# Patient Record
Sex: MALE | Race: WHITE | NOT HISPANIC OR LATINO | URBAN - METROPOLITAN AREA
[De-identification: names, ages, dates, MRNs, and addresses within clinical notes are randomized per-mention and may not be internally consistent; named-entity substitution may affect disease eponyms.]

---

## 2017-12-01 ENCOUNTER — OUTPATIENT (OUTPATIENT)
Dept: OUTPATIENT SERVICES | Facility: HOSPITAL | Age: 47
LOS: 1 days | Discharge: HOME | End: 2017-12-01

## 2017-12-01 DIAGNOSIS — Z01.818 ENCOUNTER FOR OTHER PREPROCEDURAL EXAMINATION: ICD-10-CM

## 2017-12-01 DIAGNOSIS — K40.90 UNILATERAL INGUINAL HERNIA, WITHOUT OBSTRUCTION OR GANGRENE, NOT SPECIFIED AS RECURRENT: ICD-10-CM

## 2017-12-15 ENCOUNTER — OUTPATIENT (OUTPATIENT)
Dept: OUTPATIENT SERVICES | Facility: HOSPITAL | Age: 47
LOS: 1 days | Discharge: HOME | End: 2017-12-15

## 2017-12-19 DIAGNOSIS — F17.210 NICOTINE DEPENDENCE, CIGARETTES, UNCOMPLICATED: ICD-10-CM

## 2017-12-19 DIAGNOSIS — E11.9 TYPE 2 DIABETES MELLITUS WITHOUT COMPLICATIONS: ICD-10-CM

## 2017-12-19 DIAGNOSIS — K45.8 OTHER SPECIFIED ABDOMINAL HERNIA WITHOUT OBSTRUCTION OR GANGRENE: ICD-10-CM

## 2017-12-19 DIAGNOSIS — E78.00 PURE HYPERCHOLESTEROLEMIA, UNSPECIFIED: ICD-10-CM

## 2017-12-19 DIAGNOSIS — K40.90 UNILATERAL INGUINAL HERNIA, WITHOUT OBSTRUCTION OR GANGRENE, NOT SPECIFIED AS RECURRENT: ICD-10-CM

## 2017-12-19 DIAGNOSIS — I10 ESSENTIAL (PRIMARY) HYPERTENSION: ICD-10-CM

## 2020-03-16 ENCOUNTER — RESULT CHARGE (OUTPATIENT)
Age: 50
End: 2020-03-16

## 2020-03-16 ENCOUNTER — APPOINTMENT (OUTPATIENT)
Dept: UROLOGY | Facility: CLINIC | Age: 50
End: 2020-03-16
Payer: COMMERCIAL

## 2020-03-16 VITALS
WEIGHT: 180 LBS | SYSTOLIC BLOOD PRESSURE: 158 MMHG | BODY MASS INDEX: 27.28 KG/M2 | HEART RATE: 75 BPM | HEIGHT: 68 IN | DIASTOLIC BLOOD PRESSURE: 97 MMHG | TEMPERATURE: 96.8 F

## 2020-03-16 DIAGNOSIS — Z80.1 FAMILY HISTORY OF MALIGNANT NEOPLASM OF TRACHEA, BRONCHUS AND LUNG: ICD-10-CM

## 2020-03-16 DIAGNOSIS — F17.200 NICOTINE DEPENDENCE, UNSPECIFIED, UNCOMPLICATED: ICD-10-CM

## 2020-03-16 LAB
BILIRUB UR QL STRIP: NORMAL
COLLECTION METHOD: NORMAL
GLUCOSE UR-MCNC: NORMAL
HCG UR QL: NORMAL EU/DL
HGB UR QL STRIP.AUTO: NORMAL
KETONES UR-MCNC: NORMAL
LEUKOCYTE ESTERASE UR QL STRIP: NORMAL
NITRITE UR QL STRIP: NORMAL
PH UR STRIP: 6
PROT UR STRIP-MCNC: NORMAL
SP GR UR STRIP: 1.01

## 2020-03-16 PROCEDURE — 52000 CYSTOURETHROSCOPY: CPT

## 2020-03-16 PROCEDURE — 81003 URINALYSIS AUTO W/O SCOPE: CPT | Mod: QW

## 2020-03-16 PROCEDURE — 99205 OFFICE O/P NEW HI 60 MIN: CPT | Mod: 25

## 2020-03-16 NOTE — ADDENDUM
[FreeTextEntry1] : ML CASTRO M.D.\par [address] [place] [address], [address]\par [place], [place] [address]\par [phone]\par FAX#: [phone]\par 2020\par \par Dear Nuria Grover		: 10-Parish-1970\par \par You have been scheduled for CYSTOSCOPY WITH Trans Urethral Removal Of your Bladder Tumor (TURBT)  at [facility] ([facility]) on Tuesday April  / May __2020 at _____ am/p.m.  Please avoid all aspirin and aspirin like products, etc., for one week before.  You have been given a pre admission instruction sheet.  If you have any questions concerning what you should or should not do please contact me.\par \par If you will be leaving hospital the afternoon of the procedure, I will let you know when and where I would like to see you the next day, so that I can remove the catheter.  \par \par If you are staying in the hospital overnight the catheter is usually removed the next day.  While the catheter is inside you, you may have a discomfort in the penis and or the sensation of having to urinate.  If this occurs, it can occur with or without urine coming out around the pipe or catheter.  This feeling is called a Bladder Spasm and though it can be very uncomfortable or even painful, it does not threaten your health.  If it does occur and bothers you, please call me so I can help alleviate them to whatever extent possible.  After the catheter is removed you might have some burning with urination. This usually stops within 2-3 days.  You may also have some blood in the urine.  This rarely is so severe as to cause problems.  As long as you are able to urinate, drink lots of fluids and it should clear with time.\par \par If at any point you are concerned please Do Not Hesitate to call. If it is after normal business hours and it is urgent you can reach me via service at [phone] [phone].  If it is not urgent please call the appropriate office during regular business hours.  As always if you are in doubt I would rather you call unnecessarily than not call when you should have,\par I thank you again for the confidence of allowing me to care for you.\par \par \par Sincerely yours,\par \par Ml Castro M.D.\par \par Ml Castro M.D.\par NMK\par PS: IF you have any ALLERGIES or any special requirements, such as special prescription blanks or you need the prescriptions in advance, please let me know.\par

## 2020-03-16 NOTE — LETTER BODY
[Dear  ___] : Dear  [unfilled], [Consult Letter:] : I had the pleasure of evaluating your patient, [unfilled]. [Please see my note below.] : Please see my note below. [Consult Closing:] : Thank you very much for allowing me to participate in the care of this patient.  If you have any questions, please do not hesitate to contact me. [Sincerely,] : Sincerely, [FreeTextEntry2] : Dr. Malcom Senior\par [address]\[place] [address]

## 2020-03-16 NOTE — ASSESSMENT
[FreeTextEntry1] : CT scan suggests bladder mass and we reviewed all the options. Patient is electing to undergo cystoscopy today, please see separate procedure note.\par \par UA CS and cytology will be sent from the office today\par \par cysto positive for polypoid mass right posterolateral wall\par \par

## 2020-03-16 NOTE — PHYSICAL EXAM
[General Appearance - Well Developed] : well developed [General Appearance - Well Nourished] : well nourished [Normal Appearance] : normal appearance [Well Groomed] : well groomed [General Appearance - In No Acute Distress] : no acute distress [Edema] : no peripheral edema [Respiration, Rhythm And Depth] : normal respiratory rhythm and effort [Exaggerated Use Of Accessory Muscles For Inspiration] : no accessory muscle use [Abdomen Soft] : soft [Abdomen Tenderness] : non-tender [Costovertebral Angle Tenderness] : no ~M costovertebral angle tenderness [Urethral Meatus] : meatus normal [Penis Abnormality] : normal circumcised penis [Urinary Bladder Findings] : the bladder was normal on palpation [Scrotum] : the scrotum was normal [Testes Tenderness] : no tenderness of the testes [Testes Mass (___cm)] : there were no testicular masses [Normal Station and Gait] : the gait and station were normal for the patient's age [] : no rash [No Focal Deficits] : no focal deficits [Oriented To Time, Place, And Person] : oriented to person, place, and time [Affect] : the affect was normal [Mood] : the mood was normal [Not Anxious] : not anxious [Femoral Lymph Nodes Enlarged Bilaterally] : femoral [Inguinal Lymph Nodes Enlarged Bilaterally] : inguinal [Heart Rate And Rhythm] : Heart rate and rhythm were normal [Abdomen Hernia] : no hernia was discovered [FreeTextEntry1] : ramo not done pt already very anxious

## 2020-03-16 NOTE — HISTORY OF PRESENT ILLNESS
[Urinary Urgency] : urinary urgency [Urinary Frequency] : urinary frequency [Nocturia] : nocturia [Weak Stream] : weak stream [Erectile Dysfunction] : Erectile Dysfunction [Hematuria - Gross] : gross hematuria [FreeTextEntry1] : Guilherme is a 49-year-old male who was sent in for consultation regarding gross hematuria and bladder mass.\par \par Approximately 2 months ago patient noticed a few episodes of gross hematuria. He presented to his PCP who ordered an ultrasound.\par \par Ultrasounds demonstrated questionable polypoid bladder mass. He was sent for CT scan with IV and oral contrast.\par \par CT scan demonstrated focal thickening along the right posterior lateral urinary bladder wall corresponding to sonographic finding, concerning for urothelial neoplasm. There is no lymphadenopathy. He was sent here for  evaluation\par \par He is a 60-pack-year current cigarette smoker.\par \par Baseline patient is complaining of occasional urinary frequency or feelings of incomplete bladder emptying semi-urinary urgency.\par \par He has some mild erectile dysfunction with decreased penile rigidity however he denies difficulty obtaining or maintaining an erection of sufficient quality. [Urinary Incontinence] : no urinary incontinence [Urinary Retention] : no urinary retention [Intermittency] : no intermittency [Post-Void Dribbling] : no post-void dribbling

## 2020-03-16 NOTE — LETTER HEADER
[FreeTextEntry3] : Ml Castro M.D.\par Director of Urology\par [facility]\par [address] [place] [address], [address]\[place] [address]

## 2020-05-27 ENCOUNTER — TRANSCRIPTION ENCOUNTER (OUTPATIENT)
Age: 50
End: 2020-05-27

## 2020-05-27 ENCOUNTER — APPOINTMENT (OUTPATIENT)
Dept: UROLOGY | Facility: CLINIC | Age: 50
End: 2020-05-27
Payer: COMMERCIAL

## 2020-05-27 PROCEDURE — 99214 OFFICE O/P EST MOD 30 MIN: CPT | Mod: 95

## 2020-05-27 RX ORDER — NITROFURANTOIN (MONOHYDRATE/MACROCRYSTALS) 25; 75 MG/1; MG/1
100 CAPSULE ORAL TWICE DAILY
Qty: 6 | Refills: 0 | Status: COMPLETED | COMMUNITY
Start: 2020-03-16 | End: 2020-05-27

## 2020-05-27 NOTE — HISTORY OF PRESENT ILLNESS
[Home] : at home, [unfilled] , at the time of the visit. [Medical Office: ([facility])___] : at the medical office located in  [FreeTextEntry3] : he has received, reviewed and agreed to the telemedicine consent  [FreeTextEntry1] : I called him on his cell phone at [phone], he was home and we communicated via the [employer] sarah if needed paper work can be sent to hi via his e mail at --- [email or web address]\par \par Seen 03/16/2020 for [facility] and question of a bladder mass. As well as complaining of occasional urinary frequency or feelings of incomplete bladder emptying semi-urinary urgency. He also had some mild erectile dysfunction with decreased penile rigidity however he denies difficulty obtaining or maintaining an erection of sufficient quality.  Patient was experiencing urinary urgency, urinary frequency, nocturia, weak stream, erectile dysfunction and gross hematuria, but no urinary incontinence, no urinary retention, no intermittency and no post-void dribbling. \par  Cystoscopy done the same day on 03/16/2020 showed a right posterolateral wall tumors stalk / frond like\par Note grade 3 trabeculation with early cellules was seen. He understood the need to delay during covid but those restrictions have been [partially lifted but now the pt is afraid and wants to delay to the fall. We set up telemedicine to discuss it with him\par \par  [Hematuria - Gross] : gross hematuria

## 2020-05-27 NOTE — LETTER HEADER
[FreeTextEntry3] : Ml Castro M.D.\par Director of Urology\par [facility]\par [address] [place] [address], [address]\[place] [address]

## 2020-05-27 NOTE — LETTER BODY
[Dear  ___] : Dear  [unfilled], [Courtesy Letter:] : I had the pleasure of seeing your patient, [unfilled], in my office today. [Please see my note below.] : Please see my note below. [Sincerely,] : Sincerely, [FreeTextEntry2] : Dr. Malcom Senior\par [address]\[place] [address]

## 2020-05-27 NOTE — PHYSICAL EXAM
[General Appearance - Well Developed] : well developed [General Appearance - Well Nourished] : well nourished [Normal Appearance] : normal appearance [Well Groomed] : well groomed [General Appearance - In No Acute Distress] : no acute distress [] : no respiratory distress [Respiration, Rhythm And Depth] : normal respiratory rhythm and effort [Exaggerated Use Of Accessory Muscles For Inspiration] : no accessory muscle use [Oriented To Time, Place, And Person] : oriented to person, place, and time [Affect] : the affect was normal [Mood] : the mood was normal [FreeTextEntry1] : very anxious

## 2020-06-03 ENCOUNTER — OUTPATIENT (OUTPATIENT)
Dept: OUTPATIENT SERVICES | Facility: HOSPITAL | Age: 50
LOS: 1 days | Discharge: HOME | End: 2020-06-03
Payer: COMMERCIAL

## 2020-06-03 VITALS
HEART RATE: 80 BPM | OXYGEN SATURATION: 98 % | RESPIRATION RATE: 16 BRPM | TEMPERATURE: 98 F | SYSTOLIC BLOOD PRESSURE: 127 MMHG | DIASTOLIC BLOOD PRESSURE: 75 MMHG | WEIGHT: 171.96 LBS | HEIGHT: 68 IN

## 2020-06-03 DIAGNOSIS — C67.2 MALIGNANT NEOPLASM OF LATERAL WALL OF BLADDER: ICD-10-CM

## 2020-06-03 DIAGNOSIS — Z01.818 ENCOUNTER FOR OTHER PREPROCEDURAL EXAMINATION: ICD-10-CM

## 2020-06-03 DIAGNOSIS — Z98.890 OTHER SPECIFIED POSTPROCEDURAL STATES: Chronic | ICD-10-CM

## 2020-06-03 LAB
A1C WITH ESTIMATED AVERAGE GLUCOSE RESULT: 6.5 % — HIGH (ref 4–5.6)
ALBUMIN SERPL ELPH-MCNC: 5.1 G/DL — SIGNIFICANT CHANGE UP (ref 3.5–5.2)
ALP SERPL-CCNC: 63 U/L — SIGNIFICANT CHANGE UP (ref 30–115)
ALT FLD-CCNC: 20 U/L — SIGNIFICANT CHANGE UP (ref 0–41)
ANION GAP SERPL CALC-SCNC: 17 MMOL/L — HIGH (ref 7–14)
APPEARANCE UR: CLEAR — SIGNIFICANT CHANGE UP
APTT BLD: 36.8 SEC — SIGNIFICANT CHANGE UP (ref 27–39.2)
AST SERPL-CCNC: 18 U/L — SIGNIFICANT CHANGE UP (ref 0–41)
BASOPHILS # BLD AUTO: 0.05 K/UL — SIGNIFICANT CHANGE UP (ref 0–0.2)
BASOPHILS NFR BLD AUTO: 1.3 % — HIGH (ref 0–1)
BILIRUB SERPL-MCNC: 0.5 MG/DL — SIGNIFICANT CHANGE UP (ref 0.2–1.2)
BILIRUB UR-MCNC: NEGATIVE — SIGNIFICANT CHANGE UP
BUN SERPL-MCNC: 15 MG/DL — SIGNIFICANT CHANGE UP (ref 10–20)
CALCIUM SERPL-MCNC: 9.8 MG/DL — SIGNIFICANT CHANGE UP (ref 8.5–10.1)
CHLORIDE SERPL-SCNC: 100 MMOL/L — SIGNIFICANT CHANGE UP (ref 98–110)
CO2 SERPL-SCNC: 27 MMOL/L — SIGNIFICANT CHANGE UP (ref 17–32)
COLOR SPEC: YELLOW — SIGNIFICANT CHANGE UP
CREAT SERPL-MCNC: 0.7 MG/DL — SIGNIFICANT CHANGE UP (ref 0.7–1.5)
DIFF PNL FLD: NEGATIVE — SIGNIFICANT CHANGE UP
EOSINOPHIL # BLD AUTO: 0.56 K/UL — SIGNIFICANT CHANGE UP (ref 0–0.7)
EOSINOPHIL NFR BLD AUTO: 14.2 % — HIGH (ref 0–8)
ESTIMATED AVERAGE GLUCOSE: 140 MG/DL — HIGH (ref 68–114)
GLUCOSE SERPL-MCNC: 113 MG/DL — HIGH (ref 70–99)
GLUCOSE UR QL: NEGATIVE — SIGNIFICANT CHANGE UP
HCT VFR BLD CALC: 45.6 % — SIGNIFICANT CHANGE UP (ref 42–52)
HGB BLD-MCNC: 15.3 G/DL — SIGNIFICANT CHANGE UP (ref 14–18)
IMM GRANULOCYTES NFR BLD AUTO: 0.3 % — SIGNIFICANT CHANGE UP (ref 0.1–0.3)
INR BLD: 0.99 RATIO — SIGNIFICANT CHANGE UP (ref 0.65–1.3)
KETONES UR-MCNC: NEGATIVE — SIGNIFICANT CHANGE UP
LEUKOCYTE ESTERASE UR-ACNC: NEGATIVE — SIGNIFICANT CHANGE UP
LYMPHOCYTES # BLD AUTO: 1.28 K/UL — SIGNIFICANT CHANGE UP (ref 1.2–3.4)
LYMPHOCYTES # BLD AUTO: 32.6 % — SIGNIFICANT CHANGE UP (ref 20.5–51.1)
MCHC RBC-ENTMCNC: 29 PG — SIGNIFICANT CHANGE UP (ref 27–31)
MCHC RBC-ENTMCNC: 33.6 G/DL — SIGNIFICANT CHANGE UP (ref 32–37)
MCV RBC AUTO: 86.4 FL — SIGNIFICANT CHANGE UP (ref 80–94)
MONOCYTES # BLD AUTO: 0.46 K/UL — SIGNIFICANT CHANGE UP (ref 0.1–0.6)
MONOCYTES NFR BLD AUTO: 11.7 % — HIGH (ref 1.7–9.3)
NEUTROPHILS # BLD AUTO: 1.57 K/UL — SIGNIFICANT CHANGE UP (ref 1.4–6.5)
NEUTROPHILS NFR BLD AUTO: 39.9 % — LOW (ref 42.2–75.2)
NITRITE UR-MCNC: NEGATIVE — SIGNIFICANT CHANGE UP
NRBC # BLD: 0 /100 WBCS — SIGNIFICANT CHANGE UP (ref 0–0)
PH UR: 6 — SIGNIFICANT CHANGE UP (ref 5–8)
PLATELET # BLD AUTO: 215 K/UL — SIGNIFICANT CHANGE UP (ref 130–400)
POTASSIUM SERPL-MCNC: 4.2 MMOL/L — SIGNIFICANT CHANGE UP (ref 3.5–5)
POTASSIUM SERPL-SCNC: 4.2 MMOL/L — SIGNIFICANT CHANGE UP (ref 3.5–5)
PROT SERPL-MCNC: 7.8 G/DL — SIGNIFICANT CHANGE UP (ref 6–8)
PROT UR-MCNC: SIGNIFICANT CHANGE UP
PROTHROM AB SERPL-ACNC: 11.4 SEC — SIGNIFICANT CHANGE UP (ref 9.95–12.87)
RBC # BLD: 5.28 M/UL — SIGNIFICANT CHANGE UP (ref 4.7–6.1)
RBC # FLD: 14.5 % — SIGNIFICANT CHANGE UP (ref 11.5–14.5)
SODIUM SERPL-SCNC: 144 MMOL/L — SIGNIFICANT CHANGE UP (ref 135–146)
SP GR SPEC: 1.02 — SIGNIFICANT CHANGE UP (ref 1.01–1.02)
UROBILINOGEN FLD QL: SIGNIFICANT CHANGE UP
WBC # BLD: 3.93 K/UL — LOW (ref 4.8–10.8)
WBC # FLD AUTO: 3.93 K/UL — LOW (ref 4.8–10.8)

## 2020-06-03 PROCEDURE — 93010 ELECTROCARDIOGRAM REPORT: CPT

## 2020-06-03 NOTE — H&P PST ADULT - NSICDXPASTMEDICALHX_GEN_ALL_CORE_FT
PAST MEDICAL HISTORY:  Bladder tumor     Diabetes     Hepatic steatosis     HTN (hypertension)     Hypercholesterolemia     Lung nodules

## 2020-06-03 NOTE — H&P PST ADULT - HISTORY OF PRESENT ILLNESS
48 YO MALE presents with c/o ~ 6-8 month ago "a tiny bit of blood in my urine, i had testsI& there is tumor in my bladder" pt is scheduled for cysto, turbt;  denies chest pain, palpitations, shortness of breath, dyspnea, or dysuria, or hematuria, pt denies any present pain/discomfort  exercise tolerance: 5 blocks/ flights of stairs w/o sob;

## 2020-06-03 NOTE — H&P PST ADULT - NSANTHOSAYNRD_GEN_A_CORE
No. RONNIE screening performed.  STOP BANG Legend: 0-2 = LOW Risk; 3-4 = INTERMEDIATE Risk; 5-8 = HIGH Risk/never tested, see screening tool

## 2020-06-04 LAB
CULTURE RESULTS: NO GROWTH — SIGNIFICANT CHANGE UP
SPECIMEN SOURCE: SIGNIFICANT CHANGE UP

## 2020-06-14 ENCOUNTER — LABORATORY RESULT (OUTPATIENT)
Age: 50
End: 2020-06-14

## 2020-06-16 ENCOUNTER — OUTPATIENT (OUTPATIENT)
Dept: OUTPATIENT SERVICES | Facility: HOSPITAL | Age: 50
LOS: 1 days | Discharge: HOME | End: 2020-06-16
Payer: COMMERCIAL

## 2020-06-16 ENCOUNTER — APPOINTMENT (OUTPATIENT)
Dept: UROLOGY | Facility: HOSPITAL | Age: 50
End: 2020-06-16

## 2020-06-16 ENCOUNTER — RESULT REVIEW (OUTPATIENT)
Age: 50
End: 2020-06-16

## 2020-06-16 VITALS
HEIGHT: 68 IN | TEMPERATURE: 97 F | OXYGEN SATURATION: 97 % | DIASTOLIC BLOOD PRESSURE: 82 MMHG | SYSTOLIC BLOOD PRESSURE: 147 MMHG | HEART RATE: 82 BPM | RESPIRATION RATE: 18 BRPM | WEIGHT: 171.96 LBS

## 2020-06-16 VITALS — RESPIRATION RATE: 18 BRPM | SYSTOLIC BLOOD PRESSURE: 164 MMHG | DIASTOLIC BLOOD PRESSURE: 97 MMHG | HEART RATE: 66 BPM

## 2020-06-16 DIAGNOSIS — Z98.890 OTHER SPECIFIED POSTPROCEDURAL STATES: Chronic | ICD-10-CM

## 2020-06-16 PROBLEM — I10 ESSENTIAL (PRIMARY) HYPERTENSION: Chronic | Status: ACTIVE | Noted: 2020-06-03

## 2020-06-16 PROBLEM — E78.00 PURE HYPERCHOLESTEROLEMIA, UNSPECIFIED: Chronic | Status: ACTIVE | Noted: 2020-06-03

## 2020-06-16 PROBLEM — E11.9 TYPE 2 DIABETES MELLITUS WITHOUT COMPLICATIONS: Chronic | Status: ACTIVE | Noted: 2020-06-03

## 2020-06-16 PROBLEM — D49.4 NEOPLASM OF UNSPECIFIED BEHAVIOR OF BLADDER: Chronic | Status: ACTIVE | Noted: 2020-06-03

## 2020-06-16 PROBLEM — K76.0 FATTY (CHANGE OF) LIVER, NOT ELSEWHERE CLASSIFIED: Chronic | Status: ACTIVE | Noted: 2020-06-03

## 2020-06-16 PROBLEM — R91.8 OTHER NONSPECIFIC ABNORMAL FINDING OF LUNG FIELD: Chronic | Status: ACTIVE | Noted: 2020-06-03

## 2020-06-16 LAB — GLUCOSE BLDC GLUCOMTR-MCNC: 141 MG/DL — HIGH (ref 70–99)

## 2020-06-16 PROCEDURE — 52235 CYSTOSCOPY AND TREATMENT: CPT

## 2020-06-16 PROCEDURE — 88307 TISSUE EXAM BY PATHOLOGIST: CPT | Mod: 26

## 2020-06-16 RX ORDER — ONDANSETRON 8 MG/1
4 TABLET, FILM COATED ORAL ONCE
Refills: 0 | Status: DISCONTINUED | OUTPATIENT
Start: 2020-06-16 | End: 2020-06-16

## 2020-06-16 RX ORDER — HYDROMORPHONE HYDROCHLORIDE 2 MG/ML
1 INJECTION INTRAMUSCULAR; INTRAVENOUS; SUBCUTANEOUS
Refills: 0 | Status: DISCONTINUED | OUTPATIENT
Start: 2020-06-16 | End: 2020-06-16

## 2020-06-16 RX ORDER — HYDROMORPHONE HYDROCHLORIDE 2 MG/ML
0.5 INJECTION INTRAMUSCULAR; INTRAVENOUS; SUBCUTANEOUS
Refills: 0 | Status: DISCONTINUED | OUTPATIENT
Start: 2020-06-16 | End: 2020-06-16

## 2020-06-16 RX ORDER — SODIUM CHLORIDE 9 MG/ML
1000 INJECTION, SOLUTION INTRAVENOUS
Refills: 0 | Status: DISCONTINUED | OUTPATIENT
Start: 2020-06-16 | End: 2020-06-16

## 2020-06-16 RX ORDER — ACETAMINOPHEN 500 MG
650 TABLET ORAL ONCE
Refills: 0 | Status: COMPLETED | OUTPATIENT
Start: 2020-06-16 | End: 2020-06-16

## 2020-06-16 RX ADMIN — SODIUM CHLORIDE 150 MILLILITER(S): 9 INJECTION, SOLUTION INTRAVENOUS at 08:33

## 2020-06-16 RX ADMIN — HYDROMORPHONE HYDROCHLORIDE 1 MILLIGRAM(S): 2 INJECTION INTRAMUSCULAR; INTRAVENOUS; SUBCUTANEOUS at 08:30

## 2020-06-16 RX ADMIN — Medication 650 MILLIGRAM(S): at 09:49

## 2020-06-16 NOTE — ASU DISCHARGE PLAN (ADULT/PEDIATRIC) - CARE PROVIDER_API CALL
Ml Castro  Urology  [address] [place] [address] [address] [place] [address]  Phone: [phone]  Fax: [phone]  Established Patient  Follow Up Time:

## 2020-06-16 NOTE — ASU DISCHARGE PLAN (ADULT/PEDIATRIC) - CALL YOUR DOCTOR IF YOU HAVE ANY OF THE FOLLOWING:
Pain not relieved by Medications/Nausea and vomiting that does not stop/Fever greater than (need to indicate Fahrenheit or Celsius)/Numbness, tingling, color or temperature change to extremity/Bleeding that does not stop/Inability to tolerate liquids or foods

## 2020-06-16 NOTE — ASU PATIENT PROFILE, ADULT - PMH
Bladder tumor    Cigar smoker unmotivated to quit    Diabetes    Hepatic steatosis    HTN (hypertension)    Hypercholesterolemia    Lung nodules

## 2020-06-16 NOTE — BRIEF OPERATIVE NOTE - OPERATION/FINDINGS
bladder tumor about 2.5 cm right anterolateral wall with several small sattelite lesions adjacent to the tumor

## 2020-06-16 NOTE — CHART NOTE - NSCHARTNOTEFT_GEN_A_CORE
PACU ANESTHESIA ADMISSION NOTE      Procedure:   Post op diagnosis:      ____  Intubated  TV:______       Rate: ______      FiO2: ______    _x___  Patent Airway    _x___  Full return of protective reflexes    _x___  Full recovery from anesthesia / back to baseline status    Vitals:  T(C): 35.9 (06-16-20 @ 07:18), Max: 35.9 (06-16-20 @ 06:48)  HR: 82 (06-16-20 @ 07:18) (82 - 82)  BP: 147/82 (06-16-20 @ 07:18) (147/82 - 147/82)  RR: 18 (06-16-20 @ 07:18) (18 - 18)  SpO2: 97% (06-16-20 @ 07:18) (97% - 97%)    Mental Status:  __x__ Awake   __x___ Alert   _____ Drowsy   _____ Sedated    Nausea/Vomiting:  ____ NO  __x____Yes,   See Post - Op Orders          Pain Scale (0-10):  _____    Treatment: ____ None    _x___ See Post - Op/PCA Orders    Post - Operative Fluids:   ____ Oral   __x__ See Post - Op Orders    Plan: Discharge:   _x___Home       _____Floor     _____Critical Care    _____  Other:_________________    Comments: uneventful anesthesia course no complications. VItals stable. Pt transferred to PACU

## 2020-06-16 NOTE — ASU PATIENT PROFILE, ADULT - SPIRITUAL CULTURAL, CURRENT SITUATION, PROFILE
You were assisted with scheduling a surgery as per your request    You will be given several medicines and instructions regarding her surgery      Your next appointment in the office will be as a postoperative patient rc

## 2020-06-17 ENCOUNTER — APPOINTMENT (OUTPATIENT)
Dept: UROLOGY | Facility: CLINIC | Age: 50
End: 2020-06-17

## 2020-06-18 ENCOUNTER — APPOINTMENT (OUTPATIENT)
Dept: UROLOGY | Facility: CLINIC | Age: 50
End: 2020-06-18
Payer: COMMERCIAL

## 2020-06-18 VITALS
TEMPERATURE: 97.5 F | DIASTOLIC BLOOD PRESSURE: 93 MMHG | HEART RATE: 80 BPM | BODY MASS INDEX: 27.28 KG/M2 | SYSTOLIC BLOOD PRESSURE: 162 MMHG | HEIGHT: 68 IN | WEIGHT: 180 LBS

## 2020-06-18 DIAGNOSIS — D49.4 NEOPLASM OF UNSPECIFIED BEHAVIOR OF BLADDER: ICD-10-CM

## 2020-06-18 DIAGNOSIS — C67.9 MALIGNANT NEOPLASM OF BLADDER, UNSPECIFIED: ICD-10-CM

## 2020-06-18 DIAGNOSIS — E78.00 PURE HYPERCHOLESTEROLEMIA, UNSPECIFIED: ICD-10-CM

## 2020-06-18 DIAGNOSIS — Z87.448 PERSONAL HISTORY OF OTHER DISEASES OF URINARY SYSTEM: ICD-10-CM

## 2020-06-18 DIAGNOSIS — I10 ESSENTIAL (PRIMARY) HYPERTENSION: ICD-10-CM

## 2020-06-18 DIAGNOSIS — Z79.82 LONG TERM (CURRENT) USE OF ASPIRIN: ICD-10-CM

## 2020-06-18 DIAGNOSIS — Z79.84 LONG TERM (CURRENT) USE OF ORAL HYPOGLYCEMIC DRUGS: ICD-10-CM

## 2020-06-18 DIAGNOSIS — E11.9 TYPE 2 DIABETES MELLITUS WITHOUT COMPLICATIONS: ICD-10-CM

## 2020-06-18 DIAGNOSIS — N32.89 OTHER SPECIFIED DISORDERS OF BLADDER: ICD-10-CM

## 2020-06-18 PROCEDURE — 99214 OFFICE O/P EST MOD 30 MIN: CPT

## 2020-06-18 NOTE — LETTER HEADER
[FreeTextEntry3] : Ml Castro M.D.\par Director of Urology\par [facility]\par [address] [place] [address], [address]\[place] [address]

## 2020-06-18 NOTE — LETTER BODY
[Dear  ___] : Dear  [unfilled], [Please see my note below.] : Please see my note below. [Courtesy Letter:] : I had the pleasure of seeing your patient, [unfilled], in my office today. [Sincerely,] : Sincerely, [FreeTextEntry2] : Dr. Malcom Senior\par [address]\[place] [address]

## 2020-06-18 NOTE — HISTORY OF PRESENT ILLNESS
[None] : no symptoms [FreeTextEntry1] : Guilherme Is a 50-year-old male who been following for bothersome lower urinary tract symptoms, erectile dysfunction, and gross hematuria. Cystoscopy on 3/60/20 demonstrated bladder mass. He is status post TURBT on 6/16/20 and presents today for catheter removal. He denies any complications post procedure and is doing well.\par

## 2020-06-29 ENCOUNTER — APPOINTMENT (OUTPATIENT)
Dept: UROLOGY | Facility: CLINIC | Age: 50
End: 2020-06-29
Payer: COMMERCIAL

## 2020-06-29 VITALS
SYSTOLIC BLOOD PRESSURE: 127 MMHG | HEART RATE: 95 BPM | TEMPERATURE: 98 F | WEIGHT: 180 LBS | BODY MASS INDEX: 27.28 KG/M2 | DIASTOLIC BLOOD PRESSURE: 77 MMHG | HEIGHT: 68 IN

## 2020-06-29 LAB
BILIRUB UR QL STRIP: NORMAL
CLARITY UR: CLEAR
COLLECTION METHOD: NORMAL
GLUCOSE UR-MCNC: NORMAL
HCG UR QL: NORMAL EU/DL
HGB UR QL STRIP.AUTO: NORMAL
KETONES UR-MCNC: NORMAL
LEUKOCYTE ESTERASE UR QL STRIP: NORMAL
NITRITE UR QL STRIP: NORMAL
PH UR STRIP: 5
PROT UR STRIP-MCNC: 30
SP GR UR STRIP: 1.02
SURGICAL PATHOLOGY STUDY: SIGNIFICANT CHANGE UP

## 2020-06-29 PROCEDURE — 81003 URINALYSIS AUTO W/O SCOPE: CPT | Mod: QW

## 2020-06-29 PROCEDURE — 99213 OFFICE O/P EST LOW 20 MIN: CPT

## 2020-06-29 NOTE — LETTER HEADER
[FreeTextEntry3] : Ml Castro M.D.\par Director of Urology\par [facility]\par [address] [place] [address], [address]\[place] [address]

## 2020-06-29 NOTE — HISTORY OF PRESENT ILLNESS
[Urinary Urgency] : urinary urgency [Urinary Frequency] : urinary frequency [Nocturia] : nocturia [Dysuria] : dysuria [Erectile Dysfunction] : Erectile Dysfunction [FreeTextEntry1] : Guilherme is a 50-year-old male who in the fall and possible urinary tract symptoms, erectile dysfunction, and gross hematuria. Cystoscopy in March demonstrated the bladder mass. He status post TURBT on 6/16/20 and presents today to review the pathology.\par \par Currently he is complaining of some mild dysuria\par \par At baseline he is experiencing frequency/urgency and nocturia. Additionally he has history of erectile dysfunction with decreased penile rigidity. He says he thinks at this point he is more afraid of sexual activity as he's concerned he will have pain so as soon as he starts he panics and loses it and he wonders if there is some medication that might help him.

## 2020-06-29 NOTE — ASSESSMENT
[FreeTextEntry1] : His pathology is low-grade and we discussed etiology of bladder cancer, specifically low-grade. He will follow up in 3 months for cystoscopy. He understands the rate of recurrence And the possibility of progression in both stage and or grade.\par \par Concerning his voiding symptoms, we'll get a UA C&S now and in 6 weeks to check for UTI post procedure. His symptoms may resolve now that the tumor has been resected. He will observe for now.\par \par Concerning his ED, it is likely psychogenic as he has some fear of discomfort post procedure. We'll start him with low dose sildenafil 20 mg 1-2 tablets one hour prior to intercourse. All usage, dosage, mechanism of action, and adverse events reviewed. He understands not to take more than 100 mg - 5 tablets a 24-hour period.

## 2020-06-29 NOTE — LETTER BODY
[Dear  ___] : Dear  [unfilled], [Courtesy Letter:] : I had the pleasure of seeing your patient, [unfilled], in my office today. [Please see my note below.] : Please see my note below. [Sincerely,] : Sincerely, [FreeTextEntry2] : Dr. Malcom Senior\par [address]\[place] [address]

## 2020-06-29 NOTE — PHYSICAL EXAM
[General Appearance - Well Developed] : well developed [General Appearance - Well Nourished] : well nourished [Normal Appearance] : normal appearance [Well Groomed] : well groomed [General Appearance - In No Acute Distress] : no acute distress [Abdomen Soft] : soft [Abdomen Tenderness] : non-tender [Costovertebral Angle Tenderness] : no ~M costovertebral angle tenderness [Penis Abnormality] : normal circumcised penis [Urethral Meatus] : meatus normal [Urinary Bladder Findings] : the bladder was normal on palpation [Scrotum] : the scrotum was normal [Testes Mass (___cm)] : there were no testicular masses [Testes Tenderness] : no tenderness of the testes [] : no respiratory distress [Respiration, Rhythm And Depth] : normal respiratory rhythm and effort [Exaggerated Use Of Accessory Muscles For Inspiration] : no accessory muscle use [Oriented To Time, Place, And Person] : oriented to person, place, and time [Affect] : the affect was normal [Mood] : the mood was normal [Not Anxious] : not anxious [No Focal Deficits] : no focal deficits [Normal Station and Gait] : the gait and station were normal for the patient's age [Inguinal Lymph Nodes Enlarged Bilaterally] : inguinal [Femoral Lymph Nodes Enlarged Bilaterally] : femoral [FreeTextEntry1] : no tenderness

## 2020-07-02 LAB
APPEARANCE: CLEAR
BACTERIA UR CULT: NORMAL
BILIRUBIN URINE: NEGATIVE
BLOOD URINE: NEGATIVE
COLOR: YELLOW
GLUCOSE QUALITATIVE U: NORMAL
KETONES URINE: NEGATIVE
LEUKOCYTE ESTERASE URINE: NEGATIVE
NITRITE URINE: NEGATIVE
PH URINE: 6
PROTEIN URINE: NORMAL
SPECIFIC GRAVITY URINE: 1.03
URINE CYTOLOGY: NORMAL
UROBILINOGEN URINE: NORMAL

## 2020-10-26 ENCOUNTER — APPOINTMENT (OUTPATIENT)
Dept: UROLOGY | Facility: CLINIC | Age: 50
End: 2020-10-26
Payer: COMMERCIAL

## 2020-10-26 VITALS
WEIGHT: 179 LBS | HEIGHT: 68 IN | TEMPERATURE: 97.7 F | HEART RATE: 93 BPM | BODY MASS INDEX: 27.13 KG/M2 | SYSTOLIC BLOOD PRESSURE: 136 MMHG | DIASTOLIC BLOOD PRESSURE: 83 MMHG

## 2020-10-26 LAB
BILIRUB UR QL STRIP: NORMAL
COLLECTION METHOD: NORMAL
GLUCOSE UR-MCNC: NORMAL
HCG UR QL: NORMAL EU/DL
HGB UR QL STRIP.AUTO: NORMAL
KETONES UR-MCNC: NORMAL
LEUKOCYTE ESTERASE UR QL STRIP: NORMAL
NITRITE UR QL STRIP: NORMAL
PH UR STRIP: 5
PROT UR STRIP-MCNC: NORMAL
SP GR UR STRIP: 1.01

## 2020-10-26 PROCEDURE — 99213 OFFICE O/P EST LOW 20 MIN: CPT | Mod: 25

## 2020-10-26 PROCEDURE — 99072 ADDL SUPL MATRL&STAF TM PHE: CPT

## 2020-10-26 PROCEDURE — 81003 URINALYSIS AUTO W/O SCOPE: CPT | Mod: QW

## 2020-10-26 PROCEDURE — 52000 CYSTOURETHROSCOPY: CPT

## 2020-10-26 RX ORDER — CEFPODOXIME PROXETIL 200 MG/1
200 TABLET, FILM COATED ORAL
Qty: 6 | Refills: 0 | Status: COMPLETED | COMMUNITY
Start: 2020-06-16 | End: 2020-10-26

## 2020-10-26 NOTE — LETTER HEADER
[FreeTextEntry3] : Ml Castro M.D.\par Director of Urology\par [facility]\par [address] [place] [address], [address]\[place] [address]

## 2020-10-26 NOTE — HISTORY OF PRESENT ILLNESS
[Urinary Frequency] : urinary frequency [Nocturia] : nocturia [Straining] : straining [Weak Stream] : weak stream [Erectile Dysfunction] : Erectile Dysfunction [FreeTextEntry1] : ED script given for sildenafil 20mg tabs to tale up to 5, one hour before sex and says did not try it. However without the medication sex is now only 2 x's month and even then works but poorly. 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

## 2020-10-26 NOTE — ASSESSMENT
[FreeTextEntry1] : He wants to once again try sildenafil. He knew script was given\par if his LUTS persist go to luts w/u in january\par go to interval cysto\par See separate note written today

## 2020-10-26 NOTE — LETTER BODY
[Dear  ___] : Dear  [unfilled], [Courtesy Letter:] : I had the pleasure of seeing your patient, [unfilled], in my office today. [Please see my note below.] : Please see my note below. [Sincerely,] : Sincerely, [FreeTextEntry2] : Dr. Malcom Senior\par [address]\[place] [address]

## 2020-10-26 NOTE — PHYSICAL EXAM
[General Appearance - Well Developed] : well developed [General Appearance - Well Nourished] : well nourished [Normal Appearance] : normal appearance [Well Groomed] : well groomed [General Appearance - In No Acute Distress] : no acute distress [Abdomen Soft] : soft [Abdomen Tenderness] : non-tender [Costovertebral Angle Tenderness] : no ~M costovertebral angle tenderness [Urethral Meatus] : meatus normal [Penis Abnormality] : normal circumcised penis [Testes Tenderness] : no tenderness of the testes [Edema] : no peripheral edema [] : no respiratory distress [Respiration, Rhythm And Depth] : normal respiratory rhythm and effort [Exaggerated Use Of Accessory Muscles For Inspiration] : no accessory muscle use [Oriented To Time, Place, And Person] : oriented to person, place, and time [Affect] : the affect was normal [Mood] : the mood was normal [Not Anxious] : not anxious [Normal Station and Gait] : the gait and station were normal for the patient's age

## 2021-02-16 ENCOUNTER — APPOINTMENT (OUTPATIENT)
Dept: UROLOGY | Facility: CLINIC | Age: 51
End: 2021-02-16

## 2021-03-15 ENCOUNTER — EMERGENCY (EMERGENCY)
Facility: HOSPITAL | Age: 51
LOS: 0 days | Discharge: HOME | End: 2021-03-15
Attending: EMERGENCY MEDICINE | Admitting: EMERGENCY MEDICINE
Payer: COMMERCIAL

## 2021-03-15 VITALS
DIASTOLIC BLOOD PRESSURE: 84 MMHG | TEMPERATURE: 98 F | SYSTOLIC BLOOD PRESSURE: 166 MMHG | HEIGHT: 68 IN | RESPIRATION RATE: 22 BRPM | WEIGHT: 177.91 LBS | HEART RATE: 88 BPM | OXYGEN SATURATION: 98 %

## 2021-03-15 DIAGNOSIS — M79.644 PAIN IN RIGHT FINGER(S): ICD-10-CM

## 2021-03-15 DIAGNOSIS — Z98.890 OTHER SPECIFIED POSTPROCEDURAL STATES: Chronic | ICD-10-CM

## 2021-03-15 DIAGNOSIS — E11.9 TYPE 2 DIABETES MELLITUS WITHOUT COMPLICATIONS: ICD-10-CM

## 2021-03-15 DIAGNOSIS — I10 ESSENTIAL (PRIMARY) HYPERTENSION: ICD-10-CM

## 2021-03-15 DIAGNOSIS — M79.646 PAIN IN UNSPECIFIED FINGER(S): ICD-10-CM

## 2021-03-15 DIAGNOSIS — L03.011 CELLULITIS OF RIGHT FINGER: ICD-10-CM

## 2021-03-15 PROCEDURE — 10060 I&D ABSCESS SIMPLE/SINGLE: CPT

## 2021-03-15 PROCEDURE — 99283 EMERGENCY DEPT VISIT LOW MDM: CPT | Mod: 25

## 2021-03-15 NOTE — ED PROCEDURE NOTE - PROCEDURE DATE TIME, MLM
"Chief Complaint   Patient presents with   • Fever     and chills   • Abdominal Pain     RUQ pain   • Loss of Appetite     vomiting last night   Pt BIB parents for above since last night. Pt is alert and age appropriate. VSS, afebrile. NPO discussed. Pt to lobby.  Pt is not vaccinated. \"Lots of reasons, personal decision.\"  "
"Med rec updated and complete  Allergies reviewed  Pts mother states \"He was on an antibiotic, not sure of the name\".  Called [facility] @ [phone] to verify antibiotic.  Pts mother states \"He did finishes course.  Pts mother states \"No vitamins\".       "
MD at bedside.   
PIV started x 1 attempt. Blood drawn at this time and sent to lab. Family updated on wait times for results. Family VU of NPO status. No additional needs at this time. Pt in NAD, resting on gurney. Call light in reach.     
Pt ambulated to yellow 49. family at bedside. Assessment completed. Pt awake, alert, pink, interactive, and in NAD. Lips noted to be dry  Per family, pt has R sided abdominal pain starting yesterday. Family reports nausea yesterday with one episode of emesis. pt instructed to change into gown. No needs at this time. Family VU of NPO status. Urine sample collected and labeled at BS. Call light within reach. Chart up for ERP.           
Pt to pre op via gurney with transport and parents.  Pt awake, alert, and in NAD at time of transport.  
Report to Betty in pre- op.  Pt up to bathroom.  Family verbalizes understanding of POC.  No needs at this time.  Awaiting transport.  
US at   
VS reassessed. Family VU of POC. No needs.   
15-Mar-2021 11:28

## 2021-03-15 NOTE — ED PROVIDER NOTE - NS ED ROS FT
Constitutional: (-) fever (-) malaise (-) diaphoresis (-) chills   Cardiac: (-) chest pain    Respiratory: (-) SOB (-) LEON  GI: (-) nausea (-) vomiting  MS: (+)R 2nd finger pain and swelling  Neuro: (-) headache (-) dizziness (-) numbness/tingling to extremities B/L   Skin: (-) rash (-) laceration (+)erythema R 2nd finger    Except as documented in the HPI, all other systems are negative.

## 2021-03-15 NOTE — ED PROVIDER NOTE - OBJECTIVE STATEMENT
51 yo M pmhx Dm, DLD. HTN presenting to the ED c/o localized, worsening, moderate, throbbing, right distal 2nd finger pain and swelling x 1 week. Pt states he noticed some mild swelling that has progressed over the past week, pt reports he bites his cuticles sometimes and is unsure if he did last week. Denies any alleviating factors, took Motrin with no relief. Pt reports pain is increased with palpation. Denies fever, chills, drainage from the site, injury/trauma to the finger, numbness/tingling.

## 2021-03-15 NOTE — ED PROVIDER NOTE - NSFOLLOWUPINSTRUCTIONS_ED_ALL_ED_FT
**Follow up with your Primary Care provider within 1 week. Take antibiotic as prescribed*    Paronychia    Paronychia is an infection of the skin that surrounds a nail. It usually affects the skin around a fingernail, but it may also occur near a toenail. It often causes pain and swelling around the nail. In some cases, a collection of pus (abscess) can form near or under the nail.     This condition may develop suddenly, or it may develop gradually over a longer period. In most cases, paronychia is not serious, and it will clear up with treatment.    What are the causes?  This condition may be caused by bacteria or a fungus. These germs can enter the body through an opening in the skin, such as a cut or a hangnail.    What increases the risk?  This condition is more likely to develop in people who:  Get their hands wet often, such as those who work as dishwashers, , or nurses.  Bite their fingernails or suck their thumbs.  Trim their nails very short.  Have hangnails or injured fingertips.  Get manicures.  Have diabetes.    What are the signs or symptoms?  Symptoms of this condition include:  Redness and swelling of the skin near the nail.  Tenderness around the nail when you touch the area.  Pus-filled bumps under the skin at the base and sides of the nail (cuticle).  Fluid or pus under the nail.  Throbbing pain in the area.  How is this diagnosed?  This condition is diagnosed with a physical exam. In some cases, a sample of pus may be tested to determine what type of bacteria or fungus is causing the condition.    How is this treated?  Treatment depends on the cause and severity of your condition. If your condition is mild, it may clear up on its own in a few days or after soaking in warm water. If needed, treatment may include:  Antibiotic medicine, if your infection is caused by bacteria.  Antifungal medicine, if your infection is caused by a fungus.  A procedure to drain pus from an abscess.  Anti-inflammatory medicine (corticosteroids).  Follow these instructions at home:  Wound care     Keep the affected area clean.  Soak the affected area in warm water, if told to do so by your health care provider. You may be told to do this for 20 minutes, 2–3 times a day.   Keep the area dry when you are not soaking it.  Do not try to drain an abscess yourself.  Follow instructions from your health care provider about how to take care of the affected area. Make sure you:  Wash your hands with soap and water before you change your bandage (dressing). If soap and water are not available, use hand .  Change your dressing as told by your health care provider.  If you had an abscess drained, check the area every day for signs of infection. Check for:  Redness, swelling, or pain.  Fluid or blood.  Warmth.  Pus or a bad smell.  Medicines       Take over-the-counter and prescription medicines only as told by your health care provider.  If you were prescribed an antibiotic medicine, take it as told by your health care provider. Do not stop taking the antibiotic even if you start to feel better.  General instructions     Avoid contact with harsh chemicals.  Do not pick at the affected area.  Prevention     To prevent this condition from happening again:  Wear rubber gloves when washing dishes or doing other tasks that require your hands to get wet.  Wear gloves if your hands might come in contact with  or other chemicals.  Avoid injuring your nails or fingertips.  Do not bite your nails or tear hangnails.  Do not cut your nails very short.   Do not cut your cuticles.  Use clean nail clippers or scissors when trimming nails.  Contact a health care provider if:  Your symptoms get worse or do not improve with treatment.  You have continued or increased fluid, blood, or pus coming from the affected area.  Your finger or knuckle becomes swollen or difficult to move.  Get help right away if you have:  A fever or chills.  Redness spreading away from the affected area.  Joint or muscle pain.    Summary  Paronychia is an infection of the skin that surrounds a nail. It often causes pain and swelling around the nail. In some cases, a collection of pus (abscess) can form near or under the nail.  This condition may be caused by bacteria or a fungus. These germs can enter the body through an opening in the skin, such as a cut or a hangnail.  If your condition is mild, it may clear up on its own in a few days. If needed, treatment may include medicine or a procedure to drain pus from an abscess.  To prevent this condition from happening again, wear gloves if doing tasks that require your hands to get wet or to come in contact with chemicals. Also avoid injuring your nails or fingertips.

## 2021-03-15 NOTE — ED PROVIDER NOTE - PHYSICAL EXAMINATION
GENERAL: Well-nourished, Well-developed. NAD.  HEAD: Normocephalic, atraumatic  Eyes: PERRLA, EOMI. No asymmetry. No nystagmus. No conjunctival injection. Non-icteric sclera.  Neck: Supple. FROM  CVS: RRR  MSK: (+)TTP distal R 2nd finger, no ecchymosis, FROM  Skin: (+)paronychia R 2nd finger, swelling, fluctuance, erythema. No streaking erythema, no warmth.  EXT: Radial pulses present B/L.   Neuro: AA&O x 3. Sensation grossly intact. Strength 5/5 B/L. Gait within normal limits.

## 2021-03-15 NOTE — ED PROVIDER NOTE - PATIENT PORTAL LINK FT
You can access the FollowMyHealth Patient Portal offered by [facility] by registering at the following website: [email or web address]. By joining [employer]’s FollowMyHealth portal, you will also be able to view your health information using other applications (apps) compatible with our system.

## 2021-04-08 ENCOUNTER — APPOINTMENT (OUTPATIENT)
Dept: UROLOGY | Facility: CLINIC | Age: 51
End: 2021-04-08
Payer: COMMERCIAL

## 2021-04-08 VITALS
BODY MASS INDEX: 27.43 KG/M2 | SYSTOLIC BLOOD PRESSURE: 120 MMHG | WEIGHT: 181 LBS | TEMPERATURE: 98 F | HEART RATE: 82 BPM | DIASTOLIC BLOOD PRESSURE: 76 MMHG | HEIGHT: 68 IN

## 2021-04-08 DIAGNOSIS — Z87.898 PERSONAL HISTORY OF OTHER SPECIFIED CONDITIONS: ICD-10-CM

## 2021-04-08 DIAGNOSIS — Z85.51 PERSONAL HISTORY OF MALIGNANT NEOPLASM OF BLADDER: ICD-10-CM

## 2021-04-08 DIAGNOSIS — R39.14 FEELING OF INCOMPLETE BLADDER EMPTYING: ICD-10-CM

## 2021-04-08 DIAGNOSIS — R39.12 POOR URINARY STREAM: ICD-10-CM

## 2021-04-08 PROCEDURE — 99072 ADDL SUPL MATRL&STAF TM PHE: CPT

## 2021-04-08 PROCEDURE — 99214 OFFICE O/P EST MOD 30 MIN: CPT

## 2021-04-08 RX ORDER — SILDENAFIL 20 MG/1
20 TABLET ORAL
Qty: 30 | Refills: 0 | Status: COMPLETED | OUTPATIENT
Start: 2020-06-29 | End: 2021-04-08

## 2021-04-08 NOTE — LETTER BODY
[Dear  ___] : Dear  [unfilled], [Courtesy Letter:] : I had the pleasure of seeing your patient, [unfilled], in my office today. [Please see my note below.] : Please see my note below. [Sincerely,] : Sincerely, [FreeTextEntry2] : Dr. Malcom Senior\par [address]\[place] [address]

## 2021-04-08 NOTE — ASSESSMENT
[FreeTextEntry1] : With respect to his erections he is using 80 mg of sildenafil taking 4 of the 20 mg tablets I will switch him to 100 mg tablet if he wants to he can shave off a little bit.  I would see him again for that in about 6 months\par \par With respect to his history of transitional cell carcinoma I reviewed with him the options and I am going to transfer his care to Dr. Watson with ourcancer specialist he will need an interval cystoscopy and I will arrange for that to be done.  He wants to know why the answer is "because" these cancers tend to recur I would like to find it before it becomes a problem rather than wait till it becomes a problem usually even if it does recur it stays at the same level, there is a chance for progression in stage and/or grade so it needs to be watched.

## 2021-04-08 NOTE — PHYSICAL EXAM
[General Appearance - Well Developed] : well developed [General Appearance - Well Nourished] : well nourished [Normal Appearance] : normal appearance [Well Groomed] : well groomed [General Appearance - In No Acute Distress] : no acute distress [Abdomen Soft] : soft [Abdomen Tenderness] : non-tender [Costovertebral Angle Tenderness] : no ~M costovertebral angle tenderness [Heart Rate And Rhythm] : Heart rate and rhythm were normal [Edema] : no peripheral edema [] : no respiratory distress [Exaggerated Use Of Accessory Muscles For Inspiration] : no accessory muscle use [Respiration, Rhythm And Depth] : normal respiratory rhythm and effort [Auscultation Breath Sounds / Voice Sounds] : lungs were clear to auscultation bilaterally [Oriented To Time, Place, And Person] : oriented to person, place, and time [Affect] : the affect was normal [Mood] : the mood was normal [Not Anxious] : not anxious [Normal Station and Gait] : the gait and station were normal for the patient's age [No Focal Deficits] : no focal deficits

## 2021-04-08 NOTE — LETTER HEADER
[FreeTextEntry3] : Ml Castro M.D.\par Director of Urology\par [facility]\par [address] [place] [address], [address]\[place] [address]

## 2021-04-08 NOTE — HISTORY OF PRESENT ILLNESS
[FreeTextEntry1] : Guilherme is a 50-year-old male of Mandaen Era background who we had seen in 2019/2020 was found to have a small bladder tumor which was eventually removed in June 2020.  The pathology had come back low-grade papillary with no invasion into lamina propria muscularis layer was not seen.  He had interval cystoscopy in October was supposed to have it again in January but he says somehow the system sent him to a different doctor Clarita to whom he did not go but when he realized that and try to get another appointment between his schedule in mind this was the next time.  He did the serial labs urine testing in November January and February he has been using sildenafil using 4 of the 20 mg tablets (it would be cheaper to use one of the 100s) at that is working quite well without difficulty.  He is here to review his urines depending on the findings schedule interval cystoscopy\par \par He tells me that his voiding habits are all completely back to normal without hesitation frequency urgency nocturia etc. [Erectile Dysfunction] : Erectile Dysfunction [Hematuria - Gross] : no gross hematuria

## 2021-05-06 ENCOUNTER — APPOINTMENT (OUTPATIENT)
Dept: UROLOGY | Facility: CLINIC | Age: 51
End: 2021-05-06
Payer: COMMERCIAL

## 2021-05-06 PROCEDURE — 99072 ADDL SUPL MATRL&STAF TM PHE: CPT

## 2021-05-06 PROCEDURE — 52000 CYSTOURETHROSCOPY: CPT

## 2021-05-26 ENCOUNTER — OUTPATIENT (OUTPATIENT)
Dept: OUTPATIENT SERVICES | Facility: HOSPITAL | Age: 51
LOS: 1 days | Discharge: HOME | End: 2021-05-26
Payer: COMMERCIAL

## 2021-05-26 ENCOUNTER — RESULT REVIEW (OUTPATIENT)
Age: 51
End: 2021-05-26

## 2021-05-26 VITALS
OXYGEN SATURATION: 97 % | RESPIRATION RATE: 16 BRPM | WEIGHT: 173.06 LBS | HEART RATE: 78 BPM | TEMPERATURE: 97 F | DIASTOLIC BLOOD PRESSURE: 75 MMHG | SYSTOLIC BLOOD PRESSURE: 124 MMHG | HEIGHT: 68 IN

## 2021-05-26 DIAGNOSIS — C67.9 MALIGNANT NEOPLASM OF BLADDER, UNSPECIFIED: ICD-10-CM

## 2021-05-26 DIAGNOSIS — Z01.818 ENCOUNTER FOR OTHER PREPROCEDURAL EXAMINATION: ICD-10-CM

## 2021-05-26 DIAGNOSIS — Z98.890 OTHER SPECIFIED POSTPROCEDURAL STATES: Chronic | ICD-10-CM

## 2021-05-26 LAB
A1C WITH ESTIMATED AVERAGE GLUCOSE RESULT: 6.3 % — HIGH (ref 4–5.6)
ALBUMIN SERPL ELPH-MCNC: 5 G/DL — SIGNIFICANT CHANGE UP (ref 3.5–5.2)
ALP SERPL-CCNC: 70 U/L — SIGNIFICANT CHANGE UP (ref 30–115)
ALT FLD-CCNC: 21 U/L — SIGNIFICANT CHANGE UP (ref 0–41)
ANION GAP SERPL CALC-SCNC: 12 MMOL/L — SIGNIFICANT CHANGE UP (ref 7–14)
APPEARANCE UR: CLEAR — SIGNIFICANT CHANGE UP
APTT BLD: 38.4 SEC — SIGNIFICANT CHANGE UP (ref 27–39.2)
AST SERPL-CCNC: 16 U/L — SIGNIFICANT CHANGE UP (ref 0–41)
BASOPHILS # BLD AUTO: 0 K/UL — SIGNIFICANT CHANGE UP (ref 0–0.2)
BASOPHILS NFR BLD AUTO: 0 % — SIGNIFICANT CHANGE UP (ref 0–1)
BILIRUB SERPL-MCNC: 0.5 MG/DL — SIGNIFICANT CHANGE UP (ref 0.2–1.2)
BILIRUB UR-MCNC: NEGATIVE — SIGNIFICANT CHANGE UP
BUN SERPL-MCNC: 15 MG/DL — SIGNIFICANT CHANGE UP (ref 10–20)
CALCIUM SERPL-MCNC: 9.7 MG/DL — SIGNIFICANT CHANGE UP (ref 8.5–10.1)
CHLORIDE SERPL-SCNC: 100 MMOL/L — SIGNIFICANT CHANGE UP (ref 98–110)
CO2 SERPL-SCNC: 25 MMOL/L — SIGNIFICANT CHANGE UP (ref 17–32)
COLOR SPEC: YELLOW — SIGNIFICANT CHANGE UP
CREAT SERPL-MCNC: 0.6 MG/DL — LOW (ref 0.7–1.5)
DIFF PNL FLD: NEGATIVE — SIGNIFICANT CHANGE UP
EOSINOPHIL # BLD AUTO: 0.72 K/UL — HIGH (ref 0–0.7)
EOSINOPHIL NFR BLD AUTO: 21.6 % — HIGH (ref 0–8)
ESTIMATED AVERAGE GLUCOSE: 134 MG/DL — HIGH (ref 68–114)
GIANT PLATELETS BLD QL SMEAR: PRESENT — SIGNIFICANT CHANGE UP
GLUCOSE SERPL-MCNC: 197 MG/DL — HIGH (ref 70–99)
GLUCOSE UR QL: ABNORMAL
HCT VFR BLD CALC: 41.9 % — LOW (ref 42–52)
HGB BLD-MCNC: 13.6 G/DL — LOW (ref 14–18)
INR BLD: 1.06 RATIO — SIGNIFICANT CHANGE UP (ref 0.65–1.3)
KETONES UR-MCNC: NEGATIVE — SIGNIFICANT CHANGE UP
LEUKOCYTE ESTERASE UR-ACNC: NEGATIVE — SIGNIFICANT CHANGE UP
LYMPHOCYTES # BLD AUTO: 0.98 K/UL — LOW (ref 1.2–3.4)
LYMPHOCYTES # BLD AUTO: 29.3 % — SIGNIFICANT CHANGE UP (ref 20.5–51.1)
MANUAL SMEAR VERIFICATION: SIGNIFICANT CHANGE UP
MCHC RBC-ENTMCNC: 27.5 PG — SIGNIFICANT CHANGE UP (ref 27–31)
MCHC RBC-ENTMCNC: 32.5 G/DL — SIGNIFICANT CHANGE UP (ref 32–37)
MCV RBC AUTO: 84.8 FL — SIGNIFICANT CHANGE UP (ref 80–94)
MONOCYTES # BLD AUTO: 0.34 K/UL — SIGNIFICANT CHANGE UP (ref 0.1–0.6)
MONOCYTES NFR BLD AUTO: 10.3 % — HIGH (ref 1.7–9.3)
NEUTROPHILS # BLD AUTO: 1.21 K/UL — LOW (ref 1.4–6.5)
NEUTROPHILS NFR BLD AUTO: 35.3 % — LOW (ref 42.2–75.2)
NEUTS BAND # BLD: 0.9 % — SIGNIFICANT CHANGE UP (ref 0–6)
NITRITE UR-MCNC: NEGATIVE — SIGNIFICANT CHANGE UP
PH UR: 6 — SIGNIFICANT CHANGE UP (ref 5–8)
PLAT MORPH BLD: NORMAL — SIGNIFICANT CHANGE UP
PLATELET # BLD AUTO: 193 K/UL — SIGNIFICANT CHANGE UP (ref 130–400)
POTASSIUM SERPL-MCNC: 3.7 MMOL/L — SIGNIFICANT CHANGE UP (ref 3.5–5)
POTASSIUM SERPL-SCNC: 3.7 MMOL/L — SIGNIFICANT CHANGE UP (ref 3.5–5)
PROT SERPL-MCNC: 7.4 G/DL — SIGNIFICANT CHANGE UP (ref 6–8)
PROT UR-MCNC: SIGNIFICANT CHANGE UP
PROTHROM AB SERPL-ACNC: 12.2 SEC — SIGNIFICANT CHANGE UP (ref 9.95–12.87)
RBC # BLD: 4.94 M/UL — SIGNIFICANT CHANGE UP (ref 4.7–6.1)
RBC # FLD: 13.4 % — SIGNIFICANT CHANGE UP (ref 11.5–14.5)
RBC BLD AUTO: NORMAL — SIGNIFICANT CHANGE UP
SODIUM SERPL-SCNC: 137 MMOL/L — SIGNIFICANT CHANGE UP (ref 135–146)
SP GR SPEC: 1.03 — SIGNIFICANT CHANGE UP (ref 1.01–1.03)
UROBILINOGEN FLD QL: SIGNIFICANT CHANGE UP
VARIANT LYMPHS # BLD: 2.6 % — SIGNIFICANT CHANGE UP (ref 0–5)
WBC # BLD: 3.33 K/UL — LOW (ref 4.8–10.8)
WBC # FLD AUTO: 3.33 K/UL — LOW (ref 4.8–10.8)

## 2021-05-26 PROCEDURE — 93010 ELECTROCARDIOGRAM REPORT: CPT

## 2021-05-26 PROCEDURE — 71046 X-RAY EXAM CHEST 2 VIEWS: CPT | Mod: 26

## 2021-05-26 NOTE — H&P PST ADULT - NSICDXFAMILYHX_GEN_ALL_CORE_FT
FAMILY HISTORY:  Father  Still living? Unknown  FH: heart disease, Age at diagnosis: Age Unknown    Mother  Still living? Unknown  FH: breast cancer, Age at diagnosis: Age Unknown  FH: lung cancer, Age at diagnosis: Age Unknown

## 2021-05-26 NOTE — H&P PST ADULT - HISTORY OF PRESENT ILLNESS
Pt states he has been dx with bladder cancer for 1.5 years now and has had this procedure multiple times. Pt admits to going to all of his follow up visits but during last visit an abnormal area was noted in the bladder. Pt denies any symptoms. Now for listed procedure. Denies any chest pain, difficulty breathing, SOB, palpitations, dysuria, URI, or any other infections in the last 2 weeks. Denies any recent travel, contact, or exposure to any persons with known or suspected COVID-19. Pt also denies COVID testing within the last 2 weeks. Denies any suicidal or homicidal ideations. Pt advised to self quarantine until day of procedure. Exercise tolerance of 2 flights of stairs without dyspnea. RONNIE reviewed with patient. Pt verbalized understanding of all pre-operative instructions.    Anesthesia Alert  NO--Difficult Airway  NO--History of neck surgery or radiation  NO--Limited ROM of neck  NO--History of Malignant hyperthermia  NO--No personal or family history of Pseudocholinesterase deficiency.  NO--Prior Anesthesia Complication  NO--Latex Allergy  NO--Loose teeth  NO--History of Rheumatoid Arthritis  NO--RONNIE  NO--Bleeding Risk  NO--Other_____

## 2021-05-26 NOTE — H&P PST ADULT - REASON FOR ADMISSION
51 yo male presents for PAST in preparation for cystoscopy transurethral resection of bladder tumor instillation of mitomycin on 6/16/2021 under general anesthesia by Dr. Watson ([facility]).

## 2021-05-27 LAB
CULTURE RESULTS: NO GROWTH — SIGNIFICANT CHANGE UP
SPECIMEN SOURCE: SIGNIFICANT CHANGE UP

## 2021-06-13 ENCOUNTER — LABORATORY RESULT (OUTPATIENT)
Age: 51
End: 2021-06-13

## 2021-06-13 ENCOUNTER — OUTPATIENT (OUTPATIENT)
Dept: OUTPATIENT SERVICES | Facility: HOSPITAL | Age: 51
LOS: 1 days | Discharge: HOME | End: 2021-06-13

## 2021-06-13 DIAGNOSIS — Z98.890 OTHER SPECIFIED POSTPROCEDURAL STATES: Chronic | ICD-10-CM

## 2021-06-13 DIAGNOSIS — Z11.59 ENCOUNTER FOR SCREENING FOR OTHER VIRAL DISEASES: ICD-10-CM

## 2021-06-15 NOTE — ASU PATIENT PROFILE, ADULT - PMH
Bladder tumor    Diabetes    Hepatic steatosis    HTN (hypertension)    Hypercholesterolemia    Lung nodules

## 2021-06-16 ENCOUNTER — APPOINTMENT (OUTPATIENT)
Dept: UROLOGY | Facility: HOSPITAL | Age: 51
End: 2021-06-16

## 2021-06-16 ENCOUNTER — RESULT REVIEW (OUTPATIENT)
Age: 51
End: 2021-06-16

## 2021-06-16 ENCOUNTER — OUTPATIENT (OUTPATIENT)
Dept: OUTPATIENT SERVICES | Facility: HOSPITAL | Age: 51
LOS: 1 days | Discharge: HOME | End: 2021-06-16
Payer: COMMERCIAL

## 2021-06-16 VITALS — HEART RATE: 80 BPM | DIASTOLIC BLOOD PRESSURE: 90 MMHG | RESPIRATION RATE: 16 BRPM | SYSTOLIC BLOOD PRESSURE: 151 MMHG

## 2021-06-16 VITALS
HEIGHT: 68 IN | RESPIRATION RATE: 17 BRPM | OXYGEN SATURATION: 98 % | TEMPERATURE: 97 F | DIASTOLIC BLOOD PRESSURE: 82 MMHG | WEIGHT: 181 LBS | HEART RATE: 75 BPM | SYSTOLIC BLOOD PRESSURE: 129 MMHG

## 2021-06-16 DIAGNOSIS — Z98.890 OTHER SPECIFIED POSTPROCEDURAL STATES: Chronic | ICD-10-CM

## 2021-06-16 LAB — GLUCOSE BLDC GLUCOMTR-MCNC: 129 MG/DL — HIGH (ref 70–99)

## 2021-06-16 PROCEDURE — 52235 CYSTOSCOPY AND TREATMENT: CPT

## 2021-06-16 PROCEDURE — 88307 TISSUE EXAM BY PATHOLOGIST: CPT | Mod: 26

## 2021-06-16 PROCEDURE — 51720 TREATMENT OF BLADDER LESION: CPT | Mod: 59

## 2021-06-16 RX ORDER — SODIUM CHLORIDE 9 MG/ML
1000 INJECTION, SOLUTION INTRAVENOUS
Refills: 0 | Status: DISCONTINUED | OUTPATIENT
Start: 2021-06-16 | End: 2021-06-16

## 2021-06-16 RX ORDER — HYDROMORPHONE HYDROCHLORIDE 2 MG/ML
0.5 INJECTION INTRAMUSCULAR; INTRAVENOUS; SUBCUTANEOUS
Refills: 0 | Status: DISCONTINUED | OUTPATIENT
Start: 2021-06-16 | End: 2021-06-16

## 2021-06-16 RX ADMIN — SODIUM CHLORIDE 75 MILLILITER(S): 9 INJECTION, SOLUTION INTRAVENOUS at 14:49

## 2021-06-16 NOTE — BRIEF OPERATIVE NOTE - OPERATION/FINDINGS

## 2021-06-16 NOTE — BRIEF OPERATIVE NOTE - NSICDXBRIEFPROCEDURE_GEN_ALL_CORE_FT
PROCEDURES:  Cystourethroscopy with bladder tumor resection, medium 16-Jun-2021 14:13:17  Deep Watson  Instillation of mitomycin C into bladder 16-Jun-2021 14:13:41  Deep Watson

## 2021-06-18 LAB — SURGICAL PATHOLOGY STUDY: SIGNIFICANT CHANGE UP

## 2021-06-23 DIAGNOSIS — Z79.84 LONG TERM (CURRENT) USE OF ORAL HYPOGLYCEMIC DRUGS: ICD-10-CM

## 2021-06-23 DIAGNOSIS — E78.00 PURE HYPERCHOLESTEROLEMIA, UNSPECIFIED: ICD-10-CM

## 2021-06-23 DIAGNOSIS — I10 ESSENTIAL (PRIMARY) HYPERTENSION: ICD-10-CM

## 2021-06-23 DIAGNOSIS — C67.9 MALIGNANT NEOPLASM OF BLADDER, UNSPECIFIED: ICD-10-CM

## 2021-06-23 DIAGNOSIS — E11.9 TYPE 2 DIABETES MELLITUS WITHOUT COMPLICATIONS: ICD-10-CM

## 2021-09-16 ENCOUNTER — APPOINTMENT (OUTPATIENT)
Dept: UROLOGY | Facility: CLINIC | Age: 51
End: 2021-09-16
Payer: COMMERCIAL

## 2021-09-16 PROCEDURE — 52000 CYSTOURETHROSCOPY: CPT

## 2021-09-29 LAB
BILIRUB UR QL STRIP: NORMAL
COLLECTION METHOD: NORMAL
GLUCOSE UR-MCNC: NORMAL
HCG UR QL: 0.2 EU/DL
HGB UR QL STRIP.AUTO: NORMAL
KETONES UR-MCNC: NORMAL
LEUKOCYTE ESTERASE UR QL STRIP: NORMAL
NITRITE UR QL STRIP: NORMAL
PH UR STRIP: 5.5
PROT UR STRIP-MCNC: 100
SP GR UR STRIP: 1.03

## 2021-10-11 ENCOUNTER — OUTPATIENT (OUTPATIENT)
Dept: OUTPATIENT SERVICES | Facility: HOSPITAL | Age: 51
LOS: 1 days | Discharge: HOME | End: 2021-10-11
Payer: COMMERCIAL

## 2021-10-11 ENCOUNTER — APPOINTMENT (OUTPATIENT)
Dept: UROLOGY | Facility: CLINIC | Age: 51
End: 2021-10-11
Payer: COMMERCIAL

## 2021-10-11 VITALS
WEIGHT: 184.09 LBS | RESPIRATION RATE: 17 BRPM | TEMPERATURE: 98 F | HEART RATE: 77 BPM | OXYGEN SATURATION: 99 % | DIASTOLIC BLOOD PRESSURE: 70 MMHG | HEIGHT: 68 IN | SYSTOLIC BLOOD PRESSURE: 130 MMHG

## 2021-10-11 VITALS
HEART RATE: 70 BPM | BODY MASS INDEX: 27.89 KG/M2 | HEIGHT: 68 IN | WEIGHT: 184 LBS | DIASTOLIC BLOOD PRESSURE: 91 MMHG | SYSTOLIC BLOOD PRESSURE: 135 MMHG

## 2021-10-11 DIAGNOSIS — Z01.818 ENCOUNTER FOR OTHER PREPROCEDURAL EXAMINATION: ICD-10-CM

## 2021-10-11 DIAGNOSIS — Z98.890 OTHER SPECIFIED POSTPROCEDURAL STATES: Chronic | ICD-10-CM

## 2021-10-11 DIAGNOSIS — C67.9 MALIGNANT NEOPLASM OF BLADDER, UNSPECIFIED: ICD-10-CM

## 2021-10-11 LAB
A1C WITH ESTIMATED AVERAGE GLUCOSE RESULT: 7 % — HIGH (ref 4–5.6)
ALBUMIN SERPL ELPH-MCNC: 4.9 G/DL — SIGNIFICANT CHANGE UP (ref 3.5–5.2)
ALP SERPL-CCNC: 67 U/L — SIGNIFICANT CHANGE UP (ref 30–115)
ALT FLD-CCNC: 34 U/L — SIGNIFICANT CHANGE UP (ref 0–41)
ANION GAP SERPL CALC-SCNC: 14 MMOL/L — SIGNIFICANT CHANGE UP (ref 7–14)
APPEARANCE UR: CLEAR — SIGNIFICANT CHANGE UP
APTT BLD: 40.5 SEC — HIGH (ref 27–39.2)
AST SERPL-CCNC: 22 U/L — SIGNIFICANT CHANGE UP (ref 0–41)
BASOPHILS # BLD AUTO: 0.05 K/UL — SIGNIFICANT CHANGE UP (ref 0–0.2)
BASOPHILS NFR BLD AUTO: 1.5 % — HIGH (ref 0–1)
BILIRUB SERPL-MCNC: 0.4 MG/DL — SIGNIFICANT CHANGE UP (ref 0.2–1.2)
BILIRUB UR-MCNC: NEGATIVE — SIGNIFICANT CHANGE UP
BUN SERPL-MCNC: 14 MG/DL — SIGNIFICANT CHANGE UP (ref 10–20)
CALCIUM SERPL-MCNC: 9.6 MG/DL — SIGNIFICANT CHANGE UP (ref 8.5–10.1)
CHLORIDE SERPL-SCNC: 100 MMOL/L — SIGNIFICANT CHANGE UP (ref 98–110)
CO2 SERPL-SCNC: 23 MMOL/L — SIGNIFICANT CHANGE UP (ref 17–32)
COLOR SPEC: SIGNIFICANT CHANGE UP
CREAT SERPL-MCNC: 0.6 MG/DL — LOW (ref 0.7–1.5)
DIFF PNL FLD: NEGATIVE — SIGNIFICANT CHANGE UP
EOSINOPHIL # BLD AUTO: 0.45 K/UL — SIGNIFICANT CHANGE UP (ref 0–0.7)
EOSINOPHIL NFR BLD AUTO: 13.3 % — HIGH (ref 0–8)
ESTIMATED AVERAGE GLUCOSE: 154 MG/DL — HIGH (ref 68–114)
GLUCOSE SERPL-MCNC: 155 MG/DL — HIGH (ref 70–99)
GLUCOSE UR QL: SIGNIFICANT CHANGE UP
HCT VFR BLD CALC: 41.7 % — LOW (ref 42–52)
HGB BLD-MCNC: 13.8 G/DL — LOW (ref 14–18)
IMM GRANULOCYTES NFR BLD AUTO: 0.6 % — HIGH (ref 0.1–0.3)
INR BLD: 1.01 RATIO — SIGNIFICANT CHANGE UP (ref 0.65–1.3)
KETONES UR-MCNC: NEGATIVE — SIGNIFICANT CHANGE UP
LEUKOCYTE ESTERASE UR-ACNC: NEGATIVE — SIGNIFICANT CHANGE UP
LYMPHOCYTES # BLD AUTO: 1.27 K/UL — SIGNIFICANT CHANGE UP (ref 1.2–3.4)
LYMPHOCYTES # BLD AUTO: 37.5 % — SIGNIFICANT CHANGE UP (ref 20.5–51.1)
MCHC RBC-ENTMCNC: 27.3 PG — SIGNIFICANT CHANGE UP (ref 27–31)
MCHC RBC-ENTMCNC: 33.1 G/DL — SIGNIFICANT CHANGE UP (ref 32–37)
MCV RBC AUTO: 82.4 FL — SIGNIFICANT CHANGE UP (ref 80–94)
MONOCYTES # BLD AUTO: 0.35 K/UL — SIGNIFICANT CHANGE UP (ref 0.1–0.6)
MONOCYTES NFR BLD AUTO: 10.3 % — HIGH (ref 1.7–9.3)
NEUTROPHILS # BLD AUTO: 1.25 K/UL — LOW (ref 1.4–6.5)
NEUTROPHILS NFR BLD AUTO: 36.8 % — LOW (ref 42.2–75.2)
NITRITE UR-MCNC: NEGATIVE — SIGNIFICANT CHANGE UP
NRBC # BLD: 0 /100 WBCS — SIGNIFICANT CHANGE UP (ref 0–0)
PH UR: 6.5 — SIGNIFICANT CHANGE UP (ref 5–8)
PLATELET # BLD AUTO: 204 K/UL — SIGNIFICANT CHANGE UP (ref 130–400)
POTASSIUM SERPL-MCNC: 4 MMOL/L — SIGNIFICANT CHANGE UP (ref 3.5–5)
POTASSIUM SERPL-SCNC: 4 MMOL/L — SIGNIFICANT CHANGE UP (ref 3.5–5)
PROT SERPL-MCNC: 7.5 G/DL — SIGNIFICANT CHANGE UP (ref 6–8)
PROT UR-MCNC: NEGATIVE — SIGNIFICANT CHANGE UP
PROTHROM AB SERPL-ACNC: 11.6 SEC — SIGNIFICANT CHANGE UP (ref 9.95–12.87)
RBC # BLD: 5.06 M/UL — SIGNIFICANT CHANGE UP (ref 4.7–6.1)
RBC # FLD: 13.9 % — SIGNIFICANT CHANGE UP (ref 11.5–14.5)
SODIUM SERPL-SCNC: 137 MMOL/L — SIGNIFICANT CHANGE UP (ref 135–146)
SP GR SPEC: 1.02 — SIGNIFICANT CHANGE UP (ref 1.01–1.03)
UROBILINOGEN FLD QL: SIGNIFICANT CHANGE UP
WBC # BLD: 3.39 K/UL — LOW (ref 4.8–10.8)
WBC # FLD AUTO: 3.39 K/UL — LOW (ref 4.8–10.8)

## 2021-10-11 PROCEDURE — 99213 OFFICE O/P EST LOW 20 MIN: CPT

## 2021-10-11 PROCEDURE — 93010 ELECTROCARDIOGRAM REPORT: CPT

## 2021-10-11 NOTE — H&P PST ADULT - NSICDXPASTMEDICALHX_GEN_ALL_CORE_FT
PAST MEDICAL HISTORY:  Bladder tumor     Diabetes     Erectile dysfunction     Hepatic steatosis     HTN (hypertension)     Hypercholesterolemia     Lung nodules

## 2021-10-11 NOTE — H&P PST ADULT - HEIGHT IN INCHES
Patient seen in urgent care on 11/17/18.     Disposition and Plan    Clinical Impression:  Streptococcal tonsillitis  (primary encounter diagnosis)     Disposition:  Discharge   Follow-up:  Elvin Prieto [address]  [address]  [phone]

## 2021-10-11 NOTE — HISTORY OF PRESENT ILLNESS
[Erectile Dysfunction] : Erectile Dysfunction [FreeTextEntry1] : Guilherme is a 51-year-old male, with a history of low-grade bladder cancer currently being followed by  and is scheduled to undergo reresection in a few weeks, who we have been following for ED.\par \par Currently is managed on sildenafil 100 mg with good efficacy and denies any adverse events associated at this dose.\par \par Overall he is voiding well denies any bothersome voiding complaints [Hematuria - Gross] : no gross hematuria

## 2021-10-11 NOTE — LETTER HEADER
[FreeTextEntry3] : Ml Castro M.D.\par Director of Urology\par [facility]\par [address] [place] [address], [address]\[place] [address]

## 2021-10-11 NOTE — ASSESSMENT
[FreeTextEntry1] : He is doing well with sildenafil 100 mg will continue.  He will follow up with  regarding his recurrent bladder cancer.  He will see us back in 6 months for symptom index.\par \par He kept asking me over and over various ways why he keeps getting this bladder cancer is there anything that can be given to help avoid this in the future and I reviewed with him that he is seeing an excepted expert in this area in the past but I have treated this just having 1 recurrence is not something that made us very anxious but it is something that needs to be watched and that depending on the grade and stage there may or may not be recommendations for other therapy..  However remember how many times always he asked me the same question I will not give an answer and something for which he is already seeing someone with greater expertise

## 2021-10-11 NOTE — PHYSICAL EXAM
[General Appearance - Well Developed] : well developed [General Appearance - Well Nourished] : well nourished [Normal Appearance] : normal appearance [Well Groomed] : well groomed [General Appearance - In No Acute Distress] : no acute distress [Edema] : no peripheral edema [Respiration, Rhythm And Depth] : normal respiratory rhythm and effort [] : no respiratory distress [Exaggerated Use Of Accessory Muscles For Inspiration] : no accessory muscle use [Oriented To Time, Place, And Person] : oriented to person, place, and time [Affect] : the affect was normal [Mood] : the mood was normal [Not Anxious] : not anxious [Normal Station and Gait] : the gait and station were normal for the patient's age [No Focal Deficits] : no focal deficits

## 2021-10-11 NOTE — H&P PST ADULT - REASON FOR ADMISSION
51 yo male presents for PAST in preparation for cystoscopy transurethral resection of bladder tumor instillation of mitomycin on 10/27/2021 under general anesthesia by Dr. Watson ([facility]).

## 2021-10-11 NOTE — H&P PST ADULT - HISTORY OF PRESENT ILLNESS
Pt states he has been dx with bladder cancer for 2.5 years now and has had this procedure multiple times. Pt admits to going to all of his follow up visits but during last visit an abnormal area was noted in the bladder. Pt denies any symptoms. Now for listed procedure. Denies any chest pain, difficulty breathing, SOB, palpitations, dysuria, URI, or any other infections in the last 2 weeks. Denies any recent travel, contact, or exposure to any persons with known or suspected COVID-19. Pt also denies COVID testing within the last 2 weeks. Denies any suicidal or homicidal ideations. Pt advised to self quarantine until day of procedure. Exercise tolerance of 2 flights of stairs without dyspnea. RONNIE reviewed with patient. Pt verbalized understanding of all pre-operative instructions.    Anesthesia Alert  NO--Difficult Airway  NO--History of neck surgery or radiation  NO--Limited ROM of neck  NO--History of Malignant hyperthermia  NO--No personal or family history of Pseudocholinesterase deficiency.  NO--Prior Anesthesia Complication  NO--Latex Allergy  NO--Loose teeth  NO--History of Rheumatoid Arthritis  NO--RONNIE  NO--Bleeding Risk

## 2021-10-11 NOTE — LETTER BODY
[Dear  ___] : Dear  [unfilled], [Courtesy Letter:] : I had the pleasure of seeing your patient, [unfilled], in my office today. [Please see my note below.] : Please see my note below. [Sincerely,] : Sincerely, [FreeTextEntry2] : Dr. Malcom Senior\par [address]\[place] [address]

## 2021-10-12 LAB
CULTURE RESULTS: NO GROWTH — SIGNIFICANT CHANGE UP
SPECIMEN SOURCE: SIGNIFICANT CHANGE UP

## 2021-10-24 ENCOUNTER — LABORATORY RESULT (OUTPATIENT)
Age: 51
End: 2021-10-24

## 2021-10-24 ENCOUNTER — OUTPATIENT (OUTPATIENT)
Dept: OUTPATIENT SERVICES | Facility: HOSPITAL | Age: 51
LOS: 1 days | Discharge: HOME | End: 2021-10-24

## 2021-10-24 DIAGNOSIS — Z98.890 OTHER SPECIFIED POSTPROCEDURAL STATES: Chronic | ICD-10-CM

## 2021-10-24 DIAGNOSIS — Z11.59 ENCOUNTER FOR SCREENING FOR OTHER VIRAL DISEASES: ICD-10-CM

## 2021-10-24 PROBLEM — N52.9 MALE ERECTILE DYSFUNCTION, UNSPECIFIED: Chronic | Status: ACTIVE | Noted: 2021-10-11

## 2021-10-27 ENCOUNTER — OUTPATIENT (OUTPATIENT)
Dept: OUTPATIENT SERVICES | Facility: HOSPITAL | Age: 51
LOS: 1 days | Discharge: HOME | End: 2021-10-27
Payer: COMMERCIAL

## 2021-10-27 ENCOUNTER — APPOINTMENT (OUTPATIENT)
Dept: UROLOGY | Facility: HOSPITAL | Age: 51
End: 2021-10-27

## 2021-10-27 ENCOUNTER — RESULT REVIEW (OUTPATIENT)
Age: 51
End: 2021-10-27

## 2021-10-27 VITALS
TEMPERATURE: 97 F | SYSTOLIC BLOOD PRESSURE: 178 MMHG | WEIGHT: 181 LBS | DIASTOLIC BLOOD PRESSURE: 94 MMHG | OXYGEN SATURATION: 98 % | HEART RATE: 75 BPM | RESPIRATION RATE: 17 BRPM | HEIGHT: 68 IN

## 2021-10-27 VITALS — SYSTOLIC BLOOD PRESSURE: 199 MMHG | HEART RATE: 61 BPM | DIASTOLIC BLOOD PRESSURE: 94 MMHG | RESPIRATION RATE: 17 BRPM

## 2021-10-27 DIAGNOSIS — Z98.890 OTHER SPECIFIED POSTPROCEDURAL STATES: Chronic | ICD-10-CM

## 2021-10-27 LAB — GLUCOSE BLDC GLUCOMTR-MCNC: 151 MG/DL — HIGH (ref 70–99)

## 2021-10-27 PROCEDURE — 88307 TISSUE EXAM BY PATHOLOGIST: CPT | Mod: 26

## 2021-10-27 PROCEDURE — 88305 TISSUE EXAM BY PATHOLOGIST: CPT | Mod: 26

## 2021-10-27 PROCEDURE — 52235 CYSTOSCOPY AND TREATMENT: CPT

## 2021-10-27 RX ORDER — HYDROMORPHONE HYDROCHLORIDE 2 MG/ML
0.5 INJECTION INTRAMUSCULAR; INTRAVENOUS; SUBCUTANEOUS
Refills: 0 | Status: DISCONTINUED | OUTPATIENT
Start: 2021-10-27 | End: 2021-10-27

## 2021-10-27 RX ORDER — ACETAMINOPHEN 500 MG
975 TABLET ORAL ONCE
Refills: 0 | Status: COMPLETED | OUTPATIENT
Start: 2021-10-27 | End: 2021-10-27

## 2021-10-27 RX ORDER — SODIUM CHLORIDE 9 MG/ML
1000 INJECTION, SOLUTION INTRAVENOUS
Refills: 0 | Status: DISCONTINUED | OUTPATIENT
Start: 2021-10-27 | End: 2021-11-10

## 2021-10-27 RX ADMIN — Medication 975 MILLIGRAM(S): at 11:13

## 2021-10-27 RX ADMIN — Medication 975 MILLIGRAM(S): at 12:21

## 2021-10-27 RX ADMIN — SODIUM CHLORIDE 100 MILLILITER(S): 9 INJECTION, SOLUTION INTRAVENOUS at 11:14

## 2021-10-27 NOTE — ASU DISCHARGE PLAN (ADULT/PEDIATRIC) - CARE PROVIDER_API CALL
Cleopatra Watson)  Urology  [address] [place] [address], [address]  [place] [address]  Phone: [phone]  Fax: [phone]  Established Patient  Follow Up Time:

## 2021-10-27 NOTE — BRIEF OPERATIVE NOTE - NSICDXBRIEFPROCEDURE_GEN_ALL_CORE_FT
PROCEDURES:  Cystourethroscopy with bladder tumor resection, medium 27-Oct-2021 10:40:54  Deep Watson  Instillation of mitomycin C 27-Oct-2021 10:41:31  Deep Watson

## 2021-10-27 NOTE — CHART NOTE - NSCHARTNOTEFT_GEN_A_CORE
PACU ANESTHESIA ADMISSION NOTE      Procedure:   Post op diagnosis:      ____  Intubated  TV:______       Rate: ______      FiO2: ______    __x__  Patent Airway    __x__  Full return of protective reflexes    __x__  Full recovery from anesthesia / back to baseline status    Vitals:  T(C): 36 (10-27-21 @ 08:50), Max: 36 (10-27-21 @ 07:13)  HR: 75 (10-27-21 @ 08:50) (75 - 75)  BP: 178/94 (10-27-21 @ 08:50) (178/94 - 178/94)  RR: 17 (10-27-21 @ 08:50) (17 - 17)  SpO2: 98% (10-27-21 @ 08:50) (98% - 98%)    Mental Status:  __x__ Awake   ___x__ Alert   _____ Drowsy   _____ Sedated    Nausea/Vomiting:  __x__ NO  ______Yes,   See Post - Op Orders          Pain Scale (0-10):  _____    Treatment: ____ None    __x__ See Post - Op/PCA Orders    Post - Operative Fluids:   ____ Oral   __x__ See Post - Op Orders    Plan: Discharge:   __x__Home       _____Floor     _____Critical Care    _____  Other:_________________    Comments: Patient had smooth intraoperative event, no anesthesia complication.  PACU Vital signs: HR:    80        BP:    179    /  103        RR:   16          O2 Sat:95% RA     Temp 96.3 F axillary

## 2021-11-01 LAB — SURGICAL PATHOLOGY STUDY: SIGNIFICANT CHANGE UP

## 2021-11-02 DIAGNOSIS — C67.3 MALIGNANT NEOPLASM OF ANTERIOR WALL OF BLADDER: ICD-10-CM

## 2021-11-04 ENCOUNTER — APPOINTMENT (OUTPATIENT)
Dept: UROLOGY | Facility: CLINIC | Age: 51
End: 2021-11-04
Payer: COMMERCIAL

## 2021-11-04 PROCEDURE — 99214 OFFICE O/P EST MOD 30 MIN: CPT

## 2021-11-05 NOTE — HISTORY OF PRESENT ILLNESS
[FreeTextEntry1] : 50 yo with recurrent low grade bladder cancer\par \par TURBT 6/28/21\par low grade bladder cancer\par uscle present not involved\par \par TURBT with mitomycin C instillation 10/2021\par low grade bladder cancer\par \par the pathology was reviewed with the patient\par \par he does not report any issues post resection\par \par denies dysuria\par denies frequency\par denies urgency\par

## 2021-11-05 NOTE — ASSESSMENT
[FreeTextEntry1] : 50 yo with recurrent low grade bladder cancer\par s/p TURBT x 2\par last resection with post resection mitomycin C\par \par last imaging 2/2020 - no indication for repeat imaging \par \par - cystoscopy in 3 months\par - all questions answered\par

## 2022-02-14 ENCOUNTER — APPOINTMENT (OUTPATIENT)
Dept: UROLOGY | Facility: CLINIC | Age: 52
End: 2022-02-14
Payer: COMMERCIAL

## 2022-02-14 VITALS — HEIGHT: 68 IN | BODY MASS INDEX: 27.89 KG/M2 | WEIGHT: 184 LBS | TEMPERATURE: 98.1 F

## 2022-02-14 LAB
BILIRUB UR QL STRIP: NORMAL
COLLECTION METHOD: NORMAL
GLUCOSE UR-MCNC: 1000
HCG UR QL: 0.2 EU/DL
HGB UR QL STRIP.AUTO: NORMAL
KETONES UR-MCNC: NORMAL
LEUKOCYTE ESTERASE UR QL STRIP: NORMAL
NITRITE UR QL STRIP: NORMAL
PH UR STRIP: 5.5
PROT UR STRIP-MCNC: NORMAL
SP GR UR STRIP: 1.02

## 2022-02-14 PROCEDURE — 52000 CYSTOURETHROSCOPY: CPT

## 2022-02-14 PROCEDURE — 81003 URINALYSIS AUTO W/O SCOPE: CPT | Mod: QW

## 2022-02-17 LAB — BACTERIA UR CULT: NORMAL

## 2022-04-11 ENCOUNTER — APPOINTMENT (OUTPATIENT)
Dept: UROLOGY | Facility: CLINIC | Age: 52
End: 2022-04-11

## 2022-05-16 ENCOUNTER — APPOINTMENT (OUTPATIENT)
Dept: UROLOGY | Facility: CLINIC | Age: 52
End: 2022-05-16
Payer: COMMERCIAL

## 2022-05-16 DIAGNOSIS — Z00.00 ENCOUNTER FOR GENERAL ADULT MEDICAL EXAMINATION W/OUT ABNORMAL FINDINGS: ICD-10-CM

## 2022-05-16 LAB
BILIRUB UR QL STRIP: NORMAL
COLLECTION METHOD: NORMAL
GLUCOSE UR-MCNC: NORMAL
HCG UR QL: 0.2 EU/DL
HGB UR QL STRIP.AUTO: NORMAL
KETONES UR-MCNC: NORMAL
LEUKOCYTE ESTERASE UR QL STRIP: NORMAL
NITRITE UR QL STRIP: NORMAL
PH UR STRIP: 5.5
PROT UR STRIP-MCNC: NORMAL
SP GR UR STRIP: 1.02

## 2022-05-16 PROCEDURE — 52000 CYSTOURETHROSCOPY: CPT

## 2022-05-16 PROCEDURE — 81003 URINALYSIS AUTO W/O SCOPE: CPT | Mod: QW

## 2022-07-26 NOTE — PHYSICAL EXAM
PAST SURGICAL HISTORY:  No significant past surgical history      [General Appearance - Well Developed] : well developed [General Appearance - Well Nourished] : well nourished [Normal Appearance] : normal appearance [Well Groomed] : well groomed [Abdomen Soft] : soft [General Appearance - In No Acute Distress] : no acute distress [Abdomen Tenderness] : non-tender [Costovertebral Angle Tenderness] : no ~M costovertebral angle tenderness [Urethral Meatus] : meatus normal [Penis Abnormality] : normal circumcised penis [Urinary Bladder Findings] : the bladder was normal on palpation [Scrotum] : the scrotum was normal [Testes Tenderness] : no tenderness of the testes [Testes Mass (___cm)] : there were no testicular masses [Edema] : no peripheral edema [] : no respiratory distress [Respiration, Rhythm And Depth] : normal respiratory rhythm and effort [Exaggerated Use Of Accessory Muscles For Inspiration] : no accessory muscle use [Oriented To Time, Place, And Person] : oriented to person, place, and time [Affect] : the affect was normal [Normal Station and Gait] : the gait and station were normal for the patient's age [Not Anxious] : not anxious [Mood] : the mood was normal [Femoral Lymph Nodes Enlarged Bilaterally] : femoral [No Focal Deficits] : no focal deficits [Inguinal Lymph Nodes Enlarged Bilaterally] : inguinal [FreeTextEntry1] : Catheter in place draining clear urine, no d/c

## 2022-07-29 ENCOUNTER — INPATIENT (INPATIENT)
Facility: HOSPITAL | Age: 52
LOS: 7 days | Discharge: HOME HEALTH | End: 2022-08-06
Attending: THORACIC SURGERY (CARDIOTHORACIC VASCULAR SURGERY) | Admitting: THORACIC SURGERY (CARDIOTHORACIC VASCULAR SURGERY)

## 2022-07-29 VITALS
TEMPERATURE: 97 F | OXYGEN SATURATION: 97 % | SYSTOLIC BLOOD PRESSURE: 113 MMHG | RESPIRATION RATE: 19 BRPM | DIASTOLIC BLOOD PRESSURE: 77 MMHG | HEART RATE: 75 BPM

## 2022-07-29 DIAGNOSIS — Z98.890 OTHER SPECIFIED POSTPROCEDURAL STATES: Chronic | ICD-10-CM

## 2022-07-29 LAB
ANION GAP SERPL CALC-SCNC: 10 MMOL/L — SIGNIFICANT CHANGE UP (ref 7–14)
APPEARANCE UR: CLEAR — SIGNIFICANT CHANGE UP
BILIRUB UR-MCNC: NEGATIVE — SIGNIFICANT CHANGE UP
BUN SERPL-MCNC: 15 MG/DL — SIGNIFICANT CHANGE UP (ref 10–20)
CALCIUM SERPL-MCNC: 10 MG/DL — SIGNIFICANT CHANGE UP (ref 8.5–10.1)
CHLORIDE SERPL-SCNC: 103 MMOL/L — SIGNIFICANT CHANGE UP (ref 98–110)
CO2 SERPL-SCNC: 25 MMOL/L — SIGNIFICANT CHANGE UP (ref 17–32)
COLOR SPEC: SIGNIFICANT CHANGE UP
CREAT SERPL-MCNC: 0.6 MG/DL — LOW (ref 0.7–1.5)
DIFF PNL FLD: NEGATIVE — SIGNIFICANT CHANGE UP
EGFR: 116 ML/MIN/1.73M2 — SIGNIFICANT CHANGE UP
GLUCOSE BLDC GLUCOMTR-MCNC: 122 MG/DL — HIGH (ref 70–99)
GLUCOSE BLDC GLUCOMTR-MCNC: 139 MG/DL — HIGH (ref 70–99)
GLUCOSE SERPL-MCNC: 175 MG/DL — HIGH (ref 70–99)
GLUCOSE UR QL: NEGATIVE — SIGNIFICANT CHANGE UP
HCT VFR BLD CALC: 40.3 % — LOW (ref 42–52)
HGB BLD-MCNC: 13.2 G/DL — LOW (ref 14–18)
KETONES UR-MCNC: NEGATIVE — SIGNIFICANT CHANGE UP
LEUKOCYTE ESTERASE UR-ACNC: NEGATIVE — SIGNIFICANT CHANGE UP
MCHC RBC-ENTMCNC: 26.4 PG — LOW (ref 27–31)
MCHC RBC-ENTMCNC: 32.8 G/DL — SIGNIFICANT CHANGE UP (ref 32–37)
MCV RBC AUTO: 80.6 FL — SIGNIFICANT CHANGE UP (ref 80–94)
MRSA PCR RESULT.: NEGATIVE — SIGNIFICANT CHANGE UP
NITRITE UR-MCNC: NEGATIVE — SIGNIFICANT CHANGE UP
NRBC # BLD: 0 /100 WBCS — SIGNIFICANT CHANGE UP (ref 0–0)
PH UR: 6 — SIGNIFICANT CHANGE UP (ref 5–8)
PLATELET # BLD AUTO: 221 K/UL — SIGNIFICANT CHANGE UP (ref 130–400)
POTASSIUM SERPL-MCNC: 3.5 MMOL/L — SIGNIFICANT CHANGE UP (ref 3.5–5)
POTASSIUM SERPL-SCNC: 3.5 MMOL/L — SIGNIFICANT CHANGE UP (ref 3.5–5)
PROT UR-MCNC: NEGATIVE — SIGNIFICANT CHANGE UP
RBC # BLD: 5 M/UL — SIGNIFICANT CHANGE UP (ref 4.7–6.1)
RBC # FLD: 15.3 % — HIGH (ref 11.5–14.5)
SODIUM SERPL-SCNC: 138 MMOL/L — SIGNIFICANT CHANGE UP (ref 135–146)
SP GR SPEC: 1.02 — SIGNIFICANT CHANGE UP (ref 1.01–1.03)
UROBILINOGEN FLD QL: SIGNIFICANT CHANGE UP
WBC # BLD: 3.73 K/UL — LOW (ref 4.8–10.8)
WBC # FLD AUTO: 3.73 K/UL — LOW (ref 4.8–10.8)

## 2022-07-29 PROCEDURE — 99223 1ST HOSP IP/OBS HIGH 75: CPT

## 2022-07-29 PROCEDURE — 71250 CT THORAX DX C-: CPT | Mod: 26

## 2022-07-29 PROCEDURE — 93306 TTE W/DOPPLER COMPLETE: CPT | Mod: 26

## 2022-07-29 PROCEDURE — 93880 EXTRACRANIAL BILAT STUDY: CPT | Mod: 26

## 2022-07-29 RX ORDER — SITAGLIPTIN AND METFORMIN HYDROCHLORIDE 500; 50 MG/1; MG/1
1 TABLET, FILM COATED ORAL
Qty: 0 | Refills: 0 | DISCHARGE

## 2022-07-29 RX ORDER — ASPIRIN/CALCIUM CARB/MAGNESIUM 324 MG
325 TABLET ORAL DAILY
Refills: 0 | Status: DISCONTINUED | OUTPATIENT
Start: 2022-07-29 | End: 2022-07-30

## 2022-07-29 RX ORDER — ENOXAPARIN SODIUM 100 MG/ML
80 INJECTION SUBCUTANEOUS EVERY 12 HOURS
Refills: 0 | Status: DISCONTINUED | OUTPATIENT
Start: 2022-07-29 | End: 2022-07-30

## 2022-07-29 RX ORDER — METOPROLOL TARTRATE 50 MG
25 TABLET ORAL EVERY 12 HOURS
Refills: 0 | Status: DISCONTINUED | OUTPATIENT
Start: 2022-07-29 | End: 2022-07-31

## 2022-07-29 RX ORDER — SIMVASTATIN 20 MG/1
40 TABLET, FILM COATED ORAL AT BEDTIME
Refills: 0 | Status: DISCONTINUED | OUTPATIENT
Start: 2022-07-29 | End: 2022-08-01

## 2022-07-29 RX ORDER — FAMOTIDINE 10 MG/ML
20 INJECTION INTRAVENOUS
Refills: 0 | Status: DISCONTINUED | OUTPATIENT
Start: 2022-07-29 | End: 2022-08-01

## 2022-07-29 RX ORDER — SODIUM CHLORIDE 9 MG/ML
3 INJECTION INTRAMUSCULAR; INTRAVENOUS; SUBCUTANEOUS EVERY 8 HOURS
Refills: 0 | Status: DISCONTINUED | OUTPATIENT
Start: 2022-07-29 | End: 2022-08-01

## 2022-07-29 RX ORDER — SODIUM CHLORIDE 9 MG/ML
1000 INJECTION INTRAMUSCULAR; INTRAVENOUS; SUBCUTANEOUS
Refills: 0 | Status: DISCONTINUED | OUTPATIENT
Start: 2022-07-29 | End: 2022-08-01

## 2022-07-29 RX ORDER — GEMFIBROZIL 600 MG
1 TABLET ORAL
Qty: 0 | Refills: 0 | DISCHARGE

## 2022-07-29 RX ADMIN — SODIUM CHLORIDE 3 MILLILITER(S): 9 INJECTION INTRAMUSCULAR; INTRAVENOUS; SUBCUTANEOUS at 22:29

## 2022-07-29 RX ADMIN — SODIUM CHLORIDE 3 MILLILITER(S): 9 INJECTION INTRAMUSCULAR; INTRAVENOUS; SUBCUTANEOUS at 14:36

## 2022-07-29 RX ADMIN — SIMVASTATIN 40 MILLIGRAM(S): 20 TABLET, FILM COATED ORAL at 22:01

## 2022-07-29 RX ADMIN — FAMOTIDINE 20 MILLIGRAM(S): 10 INJECTION INTRAVENOUS at 17:39

## 2022-07-29 RX ADMIN — Medication 25 MILLIGRAM(S): at 17:40

## 2022-07-29 RX ADMIN — ENOXAPARIN SODIUM 80 MILLIGRAM(S): 100 INJECTION SUBCUTANEOUS at 17:40

## 2022-07-29 NOTE — ASU PATIENT PROFILE, ADULT - INTERNATIONAL TRAVEL
No
PAST SURGICAL HISTORY:  Abdominal hernia     Colostomy in place     Deviated septum     H/O hemorrhoidectomy     S/P percutaneous endoscopic gastrostomy (PEG) tube placement     Status post cardiac surgery stents x 2

## 2022-07-29 NOTE — CONSULT NOTE ADULT - SUBJECTIVE AND OBJECTIVE BOX
Surgeon:     Consult requesting by:  Siri   [facility]      Urologist  - aquilino    HISTORY OF PRESENT ILLNESS:  51 y/o M PMH:  bladder cancer, DM, ED, HTN, HLD, Covid 1/21, ex-smoker, + FH MI                PSH: bladder tumor resection, umbilical hernia repair    Pt reports SOB and CP with exertion , unable to walk few blocks d/t chest pain, sometimes has L arm pain, pt had submaximal stress test, LHC recommended for exertional chest pain increasing in frequency    Home Medications:  Aspirin Low Dose 81 mg oral tablet, chewable: 1 tab(s) orally once a day (29 Jul 2022 07:14)  enalapril-hydrochlorothiazide 10 mg-25 mg oral tablet: 1 tab(s) orally once a day (29 Jul 2022 07:14)  gemfibrozil 600 mg oral tablet: 1 tab(s) orally 2 times a day (29 Jul 2022 07:14)  Janumet 50 mg-1000 mg oral tablet: 1 tab(s) orally 2 times a day (29 Jul 2022 07:14)  Metoprolol Succinate ER 25 mg oral tablet, extended release: 1 tab(s) orally once a day (29 Jul 2022 07:14)  simvastatin 40 mg oral tablet: 1 tab(s) orally once a day (at bedtime) (29 Jul 2022 07:14)    NYHA functional class    [ X ] Class I (no limitation) [ ] Class II (slight limitation) [ ] Class III (marked limitation) [ ] Class IV (symptoms at rest)    PAST MEDICAL & SURGICAL HISTORY:  HTN (hypertension)  Diabetes  Bladder tumor  Hypercholesterolemia  Lung nodules  Hepatic steatosis  Erectile dysfunction  2019 novel coronavirus disease (COVID-19)  1/21      History of hernia repair  History of bladder surgery  TURBT X2    MEDICATIONS  (STANDING):    MEDICATIONS  (PRN):    Antiplatelet therapy:    asa                       Last dose/amt:    Allergies    No Known Allergies    Intolerances        SOCIAL HISTORY:  Smoker: [ ] Yes  [ ] No        PACK YEARS:                         WHEN QUIT?  ETOH use: [ ] Yes  [ ] No              FREQUENCY / QUANTITY:  Ilicit Drug use:  [ ] Yes  [ ] No  Occupation:  Lives with:  Assisted device use:    FAMILY HISTORY:  FH: lung cancer (Mother)    FH: breast cancer (Mother)    FH: heart disease (Father)    Review of Systems  CONSTITUTIONAL: no fever, chills or night sweats]   NEURO:  denies seizures, paralysis or paresthesias                                                                                EYES: wears glasses, no double vision, no blurry vision                                                                          ENMT: no difficulty hearing, vertigo, dysphagia,epistaxis recent dental work [ ]                                      CV:  denies chest pain, LEON or palpatations at current time                                                                                            RESPIRATORY:  no cough or hemoptysis                                                                 GI:  no nausea, vomiting, constipation or diarrhea   : denies dysuria, hematuria, incontinence or retention                                                                                           MUSKULOSKELETAL:  denies joint swelling or muscle weakness  PSYCH:  denies dementia, depresion, anxiety   ENDOCRINE:  cold intolerance[ ] heat intolerance[ ] polydipsia[ ]                                                                                                                                                                                                PHYSICAL EXAM  Vital Signs Last 24 Hrs    HR: 77  BP: 127/81--  RR: 15  SpO2: 98    CONSTITUTIONAL:    well nourished, well developed, overweight in NAD                                                                       Neuro: oriented to person/place & time with no focal motor or sensory  deficits     Eyes: PERRLA, EOMI, no nystagmus, sclera anicteric  ENT:  nasal/oral mucosa with absence of cyanosis, fair dentition  Neck: no jugular vein distention, trachea midline, no goiter   Chest: bilatertal breath sounds with good air movement absence of wheezes, rales, or rhonchi                                                                           CV:  RSR, S1S2, no significant murmur appreciated  Carotids: No Bruits  GI:  soft, non-tender non-distended, + bowel sounds                                                                                                          Extremities:  no evidence of cyanosis or deformity, no pedal edema Edema  Extremity Pulses: right / left:  femorals 2+/ 2+; DP's 2+/2+; radials 2+/2+    negative Allens  SKIN : no rashes                                                          LABS:                        13.2   3.73  )-----------( 221      ( 29 Jul 2022 07:00 )             40.3     07-29    138  |  103  |  15  ----------------------------<  175<H>  3.5   |  25  |  0.6<L>    Ca    10.0      29 Jul 2022 07:00      COVID-19:     Cardiac Cath:      STS Score:         Assessment/ Plan:                 Surgeon:     Consult requesting by:  Siri   [facility]      Urologist  - aquilino    HISTORY OF PRESENT ILLNESS:  51 y/o M PMH:  bladder cancer, DM, ED, HTN, HLD, Covid 1/21, ex-smoker, + FH MI                PSH: bladder tumor resection, umbilical hernia repair    Pt reports SOB and CP with exertion , unable to walk few blocks d/t chest pain, sometimes has L arm pain, pt had submaximal stress test, LHC recommended for exertional chest pain increasing in frequency    cc demonstrated left main disease with multi-vessel involvement    Home Medications:  Aspirin Low Dose 81 mg oral tablet, chewable: 1 tab(s) orally once a day (29 Jul 2022 07:14)  enalapril-hydrochlorothiazide 10 mg-25 mg oral tablet: 1 tab(s) orally once a day (29 Jul 2022 07:14)  gemfibrozil 600 mg oral tablet: 1 tab(s) orally 2 times a day (29 Jul 2022 07:14)  Janumet 50 mg-1000 mg oral tablet: 1 tab(s) orally 2 times a day (29 Jul 2022 07:14)  Metoprolol Succinate ER 25 mg oral tablet, extended release: 1 tab(s) orally once a day (29 Jul 2022 07:14)  simvastatin 40 mg oral tablet: 1 tab(s) orally once a day (at bedtime) (29 Jul 2022 07:14)    NYHA functional class    [ X ] Class I (no limitation) [ ] Class II (slight limitation) [ ] Class III (marked limitation) [ ] Class IV (symptoms at rest)    PAST MEDICAL & SURGICAL HISTORY:  HTN (hypertension)  Diabetes  Bladder tumor  Hypercholesterolemia  Lung nodules  Hepatic steatosis  Erectile dysfunction  2019 novel coronavirus disease (COVID-19) 1/21      History of left hernia repair  History of bladder surgery, TURBT X2 + cancer  - Low grade urothelial carcinoma  s/p umbilical herniorrhaphy     MEDICATIONS  (STANDING):    MEDICATIONS  (PRN):    Antiplatelet therapy:    asa                       Last dose/amt:    Allergies    No Known Allergies    Intolerances        SOCIAL HISTORY:  Smoker: [ ] Yes  stopped 3 yrs ago  ETOH use: none  Ilicit Drug use:  none  Occupation:   Lives with: wife  Assisted device use: none    FAMILY HISTORY:  FH: lung cancer (Mother)  FH: breast cancer (Mother)  FH: heart disease (Father) 50 years old CAD    Review of Systems  CONSTITUTIONAL: no fever, chills or night sweats]   NEURO:  denies seizures, paralysis or paresthesias                                                                                EYES: wears glasses, no double vision, no blurry vision                                                                          ENMT: no difficulty hearing, vertigo, dysphagia, epistaxis recent dental work [ ]                                      CV:  denies chest pain, LEON or palpatations at current time                                                                                            RESPIRATORY:  no cough or hemoptysis                                                                 GI:  no nausea, vomiting, constipation or diarrhea   : denies dysuria, hematuria, incontinence or retention                                                                                           MUSKULOSKELETAL:  denies joint swelling or muscle weakness  PSYCH:  denies dementia, depression anxiety   ENDOCRINE:  cold intolerance[ ] heat intolerance[ ] polydipsia[ ]                                                                                                                                                                                                PHYSICAL EXAM  Vital Signs Last 24 Hrs  HR: 77  BP: 127/81--  RR: 15  SpO2: 98    CONSTITUTIONAL:    well nourished, well developed, overweight M in NAD                                                                       Neuro: oriented to person/place & time with no focal motor or sensory  deficits     Eyes: PERRLA, EOMI, no nystagmus, sclera anicteric  ENT:  nasal/oral mucosa with absence of cyanosis, fair dentition  Neck: no jugular vein distention, trachea midline, no goiter   Chest: bilateral breath sounds with good air movement absence of wheezes, rales, or rhonchi                                                                           CV:  RSR, S1S2, no significant murmur appreciated  Carotids: No Bruits  GI:  soft, non-tender non-distended, + bowel sounds                                                                                                          Extremities:  no evidence of cyanosis or deformity, no pedal edema   Extremity Pulses: right / left:  femorals 2+/ 2+; DP's 2+/2+; radials pressure dressing /2+    negative Allens  SKIN : no rashes                                                          LABS:                        13.2   3.73  )-----------( 221      ( 29 Jul 2022 07:00 )             40.3     07-29    138  |  103  |  15  ----------------------------<  175<H>  3.5   |  25  |  0.6<L>    Ca    10.0      29 Jul 2022 07:00      COVID-19: negative7/26    Cardiac Cath: Left main 3- percent with 80 % mid LAD, 90% diag and RCA disease      STS Score:     Isolated CAB  Risk of Mortality:  0.216%  Renal Failure:  0.278%  Permanent Stroke:  0.304%  Prolonged Ventilation:  1.444%  DSW Infection:  0.130%  Reoperation:  1.598%  Morbidity or Mortality:  2.761%  Short Length of Stay:  81.793%  Long Length of Stay:  0.639%    Assessment/ Plan:    53 yo M presented for elective LHC due to symptoms suggestive of unstable angina as with a failed stress test. CC revealed multivessel disease for which multivessel myocardial revascularization via CABG is recommended.     Admit Patient to CTS  Start pre-op CABG work up  therapeutic Lovenox  IV hydration  sliding scale insulin coverage  incentive spirometer  Simple spirometry  CT chest  CAD - continue ASA, BB and statin  DM - hold Janumet - recent dye load - sliding scale insulin, may need to add Lantus  Attending to see patient

## 2022-07-29 NOTE — H&P CARDIOLOGY - NSICDXPASTMEDICALHX_GEN_ALL_CORE_FT
PAST MEDICAL HISTORY:  2019 novel coronavirus disease (COVID-19) 1/21    Bladder tumor     Diabetes     Erectile dysfunction     Hepatic steatosis     HTN (hypertension)     Hypercholesterolemia     Lung nodules

## 2022-07-29 NOTE — CONSULT NOTE ADULT - NS ATTEND AMEND GEN_ALL_CORE FT
worsening angina and severe CAD,. will admit for CABG. understands all the risks of surgery. allens test is negative, CABG Monday.

## 2022-07-29 NOTE — H&P CARDIOLOGY - HISTORY OF PRESENT ILLNESS
51 y/o M PMH:  bladder cancer, DM, ED, HTN, HLD, Covid 1/21, ex-smoker, + FH MI                PSH: bladder tumor resection, umbilical hernia repair    Pt reports SOB and CP with exertion , unable to walk few blocks d/t chest pain, sometimes has L arm pain, pt had submaximal stress test, LHC recommended for exertional chest pain increasing in frequency        Vital Signs Last 24 Hrs  T(C): --  T(F): --  HR: --  BP --107/70  BP(mean): --72  RR: --  SpO2: --        Pre cath note:  indication:  [ ] STEMI                [ ] NSTEMI                 [ ] Acute coronary syndrome                   [ ]Unstable Angina   [ ] high risk  [ ] intermediate risk  [ ] low risk                   [ x Stable Angina     non-invasive testing:                          Date:                     result: [ ] high risk  [  intermediate risk  [ ] low risk    Anti- Anginal medications:                    [ ] not used                       [ x used                   [ ] not used but strong indication not to use    Ejection Fraction                   [ ] <29            [ ] 30-39%   [ ] 40-49%     [ ]>50%    CHF                   [ ] active (within last 14 days on meds   [ ] Chronic (on meds but no exacerbation)    COPD                   [ ] mild (on chronic bronchodilators)  [ ] moderate (on chronic steroid therapy)      [ ] severe (indication for home O2 or PACO2 >50)    Other risk factors:                     [ ] Previous MI                     [ ] CVA/ stroke                    [ ] carotid stent/ CEA                    [ ] PVD/PAD- (arterial aneurysm, non-palpable pulses, tortuous vessel with inability to insert catheter, infra-renal dissection, renal or subclavian artery stenosis)                    [x] diabetic                    [ ] previous CABG                    [ ] Renal Failure     Bleeding Risk: 0.8%    REVIEW OF SYSTEMS:  CONSTITUTIONAL: No fever, weight loss, or fatigue  CARDIOLOGY: + chest painshortness of breath with exertion  RESPIRATORY: denies shortness of breath, wheezing  NEUROLOGICAL: NO weakness, no focal deficits to report.  ENDOCRINOLOGICAL: no recent change in diabetic medications.   GI: no BRBPR, no N,V,diarrhea.     PHYSICAL EXAM:  · CONSTITUTIONAL:	Well-developed, well nourished    ·RESPIRATORY:   airway patent; breath sounds equal; good air movement; respirations non-labored; clear to auscultation bilaterally; no chest wall tenderness; no intercostal retractions; no rales,rhonchi or wheeze  · CARDIOVASCULAR	regular rate and rhythm  no rub  no murmur  normal PMI  · EXTREMITIES: No cyanosis, clubbing or edema  · VASCULAR: 	Equal and normal pulses (carotid, femoral, dorsalis pedis)  	        EKG: SR  EF: Not available

## 2022-07-29 NOTE — CHART NOTE - NSCHARTNOTEFT_GEN_A_CORE
PRE-OP DIAGNOSIS:  Positive Stress Test      PROCEDURE:     [X] Coronary Angiogram     [X] LHC     [] LVG     [] RHC     [X] Intervention (see below)         PHYSICIAN:  Dr. Horner    ASSISTANT:  Dr. Aj       PROCEDURE DESCRIPTION:     Consent:      [X] Patient     [] Family Member     []  Used        Anesthesia:     [X] General     [] Sedation     [X] Local        Access & Closure:     [X] 6 Fr R Radial Artery     [] Fr Femoral Artery     [] Fr Femoral Vein     [] Fr Brachial Vein       IV Contrast: 125 mL        Intervention: Diagnostic IVUS      Implants: None       FINDINGS:     Coronary Dominance: Right Dominate      LM: Mild LM disease.  MLA 6.8    LAD: 90% mid LAD lesion. 90% ostial Diag lesion    CX: 50-60% prox Lcx. 90% stenosis in distal Lcx    RCA: 90% diffuse prox RCA calcified lesion. 80% diffuse Mid RCA lesion EVELINA 2 flow      LVEDP: 23 mmHg     EF: 60 %        ESTIMATED BLOOD LOSS: < 10 mL        CONDITION:     [X] Good     [] Fair     [] Critical        SPECIMEN REMOVED: N/A       POST-OP DIAGNOSIS:      [] Normal Coronary Angiogram     [] Mild Coronary Artery Disease (< 50% stenosis)     [X] 3 Vessel Coronary Artery Disease SYNTAX 34       PLAN OF CARE:     - CT surgery consult  - IVF  cc/hr x4hr  - Monitor R radial site

## 2022-07-30 LAB
A1C WITH ESTIMATED AVERAGE GLUCOSE RESULT: 6.9 % — HIGH (ref 4–5.6)
ALBUMIN SERPL ELPH-MCNC: 4.4 G/DL — SIGNIFICANT CHANGE UP (ref 3.5–5.2)
ALP SERPL-CCNC: 47 U/L — SIGNIFICANT CHANGE UP (ref 30–115)
ALT FLD-CCNC: 49 U/L — HIGH (ref 0–41)
ANION GAP SERPL CALC-SCNC: 10 MMOL/L — SIGNIFICANT CHANGE UP (ref 7–14)
APTT BLD: 36.7 SEC — SIGNIFICANT CHANGE UP (ref 27–39.2)
AST SERPL-CCNC: 31 U/L — SIGNIFICANT CHANGE UP (ref 0–41)
BASOPHILS # BLD AUTO: 0.02 K/UL — SIGNIFICANT CHANGE UP (ref 0–0.2)
BASOPHILS NFR BLD AUTO: 0.6 % — SIGNIFICANT CHANGE UP (ref 0–1)
BILIRUB SERPL-MCNC: 0.3 MG/DL — SIGNIFICANT CHANGE UP (ref 0.2–1.2)
BUN SERPL-MCNC: 13 MG/DL — SIGNIFICANT CHANGE UP (ref 10–20)
CALCIUM SERPL-MCNC: 9.2 MG/DL — SIGNIFICANT CHANGE UP (ref 8.5–10.1)
CHLORIDE SERPL-SCNC: 103 MMOL/L — SIGNIFICANT CHANGE UP (ref 98–110)
CO2 SERPL-SCNC: 26 MMOL/L — SIGNIFICANT CHANGE UP (ref 17–32)
CREAT SERPL-MCNC: 0.5 MG/DL — LOW (ref 0.7–1.5)
EGFR: 123 ML/MIN/1.73M2 — SIGNIFICANT CHANGE UP
EOSINOPHIL # BLD AUTO: 0.27 K/UL — SIGNIFICANT CHANGE UP (ref 0–0.7)
EOSINOPHIL NFR BLD AUTO: 8.5 % — HIGH (ref 0–8)
ESTIMATED AVERAGE GLUCOSE: 151 MG/DL — HIGH (ref 68–114)
GLUCOSE BLDC GLUCOMTR-MCNC: 112 MG/DL — HIGH (ref 70–99)
GLUCOSE BLDC GLUCOMTR-MCNC: 126 MG/DL — HIGH (ref 70–99)
GLUCOSE BLDC GLUCOMTR-MCNC: 138 MG/DL — HIGH (ref 70–99)
GLUCOSE BLDC GLUCOMTR-MCNC: 180 MG/DL — HIGH (ref 70–99)
GLUCOSE BLDC GLUCOMTR-MCNC: 237 MG/DL — HIGH (ref 70–99)
GLUCOSE SERPL-MCNC: 133 MG/DL — HIGH (ref 70–99)
HCT VFR BLD CALC: 39.8 % — LOW (ref 42–52)
HGB BLD-MCNC: 12.6 G/DL — LOW (ref 14–18)
IMM GRANULOCYTES NFR BLD AUTO: 0.3 % — SIGNIFICANT CHANGE UP (ref 0.1–0.3)
INR BLD: 1.08 RATIO — SIGNIFICANT CHANGE UP (ref 0.65–1.3)
LYMPHOCYTES # BLD AUTO: 1.18 K/UL — LOW (ref 1.2–3.4)
LYMPHOCYTES # BLD AUTO: 37.2 % — SIGNIFICANT CHANGE UP (ref 20.5–51.1)
MCHC RBC-ENTMCNC: 25.6 PG — LOW (ref 27–31)
MCHC RBC-ENTMCNC: 31.7 G/DL — LOW (ref 32–37)
MCV RBC AUTO: 80.9 FL — SIGNIFICANT CHANGE UP (ref 80–94)
MONOCYTES # BLD AUTO: 0.36 K/UL — SIGNIFICANT CHANGE UP (ref 0.1–0.6)
MONOCYTES NFR BLD AUTO: 11.4 % — HIGH (ref 1.7–9.3)
NEUTROPHILS # BLD AUTO: 1.33 K/UL — LOW (ref 1.4–6.5)
NEUTROPHILS NFR BLD AUTO: 42 % — LOW (ref 42.2–75.2)
NRBC # BLD: 0 /100 WBCS — SIGNIFICANT CHANGE UP (ref 0–0)
NT-PROBNP SERPL-SCNC: 10 PG/ML — SIGNIFICANT CHANGE UP (ref 0–300)
PLATELET # BLD AUTO: 204 K/UL — SIGNIFICANT CHANGE UP (ref 130–400)
POTASSIUM SERPL-MCNC: 3.5 MMOL/L — SIGNIFICANT CHANGE UP (ref 3.5–5)
POTASSIUM SERPL-SCNC: 3.5 MMOL/L — SIGNIFICANT CHANGE UP (ref 3.5–5)
PROT SERPL-MCNC: 6.4 G/DL — SIGNIFICANT CHANGE UP (ref 6–8)
PROTHROM AB SERPL-ACNC: 12.4 SEC — SIGNIFICANT CHANGE UP (ref 9.95–12.87)
RBC # BLD: 4.92 M/UL — SIGNIFICANT CHANGE UP (ref 4.7–6.1)
RBC # FLD: 15.4 % — HIGH (ref 11.5–14.5)
SARS-COV-2 RNA SPEC QL NAA+PROBE: SIGNIFICANT CHANGE UP
SODIUM SERPL-SCNC: 139 MMOL/L — SIGNIFICANT CHANGE UP (ref 135–146)
WBC # BLD: 3.17 K/UL — LOW (ref 4.8–10.8)
WBC # FLD AUTO: 3.17 K/UL — LOW (ref 4.8–10.8)

## 2022-07-30 PROCEDURE — 93010 ELECTROCARDIOGRAM REPORT: CPT

## 2022-07-30 PROCEDURE — 71045 X-RAY EXAM CHEST 1 VIEW: CPT | Mod: 26

## 2022-07-30 RX ORDER — SODIUM CHLORIDE 9 MG/ML
1000 INJECTION, SOLUTION INTRAVENOUS
Refills: 0 | Status: DISCONTINUED | OUTPATIENT
Start: 2022-07-30 | End: 2022-08-01

## 2022-07-30 RX ORDER — GLUCAGON INJECTION, SOLUTION 0.5 MG/.1ML
1 INJECTION, SOLUTION SUBCUTANEOUS ONCE
Refills: 0 | Status: DISCONTINUED | OUTPATIENT
Start: 2022-07-30 | End: 2022-08-01

## 2022-07-30 RX ORDER — ENOXAPARIN SODIUM 100 MG/ML
80 INJECTION SUBCUTANEOUS ONCE
Refills: 0 | Status: DISCONTINUED | OUTPATIENT
Start: 2022-07-30 | End: 2022-07-30

## 2022-07-30 RX ORDER — DEXTROSE 50 % IN WATER 50 %
25 SYRINGE (ML) INTRAVENOUS ONCE
Refills: 0 | Status: DISCONTINUED | OUTPATIENT
Start: 2022-07-30 | End: 2022-08-01

## 2022-07-30 RX ORDER — DEXTROSE 50 % IN WATER 50 %
15 SYRINGE (ML) INTRAVENOUS ONCE
Refills: 0 | Status: DISCONTINUED | OUTPATIENT
Start: 2022-07-30 | End: 2022-08-01

## 2022-07-30 RX ORDER — INSULIN LISPRO 100/ML
VIAL (ML) SUBCUTANEOUS
Refills: 0 | Status: DISCONTINUED | OUTPATIENT
Start: 2022-07-30 | End: 2022-08-01

## 2022-07-30 RX ORDER — ASPIRIN/CALCIUM CARB/MAGNESIUM 324 MG
81 TABLET ORAL DAILY
Refills: 0 | Status: DISCONTINUED | OUTPATIENT
Start: 2022-07-30 | End: 2022-08-01

## 2022-07-30 RX ORDER — DEXTROSE 50 % IN WATER 50 %
12.5 SYRINGE (ML) INTRAVENOUS ONCE
Refills: 0 | Status: DISCONTINUED | OUTPATIENT
Start: 2022-07-30 | End: 2022-08-01

## 2022-07-30 RX ADMIN — ENOXAPARIN SODIUM 80 MILLIGRAM(S): 100 INJECTION SUBCUTANEOUS at 05:23

## 2022-07-30 RX ADMIN — Medication 81 MILLIGRAM(S): at 11:35

## 2022-07-30 RX ADMIN — FAMOTIDINE 20 MILLIGRAM(S): 10 INJECTION INTRAVENOUS at 05:23

## 2022-07-30 RX ADMIN — Medication 25 MILLIGRAM(S): at 05:23

## 2022-07-30 RX ADMIN — SODIUM CHLORIDE 3 MILLILITER(S): 9 INJECTION INTRAMUSCULAR; INTRAVENOUS; SUBCUTANEOUS at 05:23

## 2022-07-30 RX ADMIN — SODIUM CHLORIDE 3 MILLILITER(S): 9 INJECTION INTRAMUSCULAR; INTRAVENOUS; SUBCUTANEOUS at 13:46

## 2022-07-30 RX ADMIN — SIMVASTATIN 40 MILLIGRAM(S): 20 TABLET, FILM COATED ORAL at 21:27

## 2022-07-30 RX ADMIN — Medication 1: at 16:53

## 2022-07-30 RX ADMIN — Medication 25 MILLIGRAM(S): at 17:02

## 2022-07-30 RX ADMIN — SODIUM CHLORIDE 3 MILLILITER(S): 9 INJECTION INTRAMUSCULAR; INTRAVENOUS; SUBCUTANEOUS at 21:29

## 2022-07-30 RX ADMIN — FAMOTIDINE 20 MILLIGRAM(S): 10 INJECTION INTRAVENOUS at 17:02

## 2022-07-30 NOTE — PROGRESS NOTE ADULT - SUBJECTIVE AND OBJECTIVE BOX
OPERATIVE PROCEDURE(s):                POD #                       52yMale  SURGEON(s): LAISHA Lopez  SUBJECTIVE ASSESSMENT:   Vital Signs Last 24 Hrs  T(F): 98 (2022 08:00), Max: 98.2 (2022 04:00)  HR: 73 (2022 08:00) (65 - 87)  BP: 101/62 (2022 08:00) (96/55 - 140/88)  BP(mean): 77 (2022 08:00) (70 - 111)  RR: 23 (2022 08:00) (5 - 39)  SpO2: 95% (2022 08:00) (94% - 97%)      I&O's Detail    2022 07:01  -  2022 07:00  --------------------------------------------------------  IN:    Oral Fluid: 150 mL    sodium chloride 0.9%: 300 mL  Total IN: 450 mL    OUT:    Voided (mL): 1550 mL  Total OUT: 1550 mL        Net:   I&O's Detail    2022 07:01  -  2022 07:00  --------------------------------------------------------  Total NET: -1100 mL        CAPILLARY BLOOD GLUCOSE      POCT Blood Glucose.: 138 mg/dL (2022 07:10)  POCT Blood Glucose.: 122 mg/dL (2022 22:04)  POCT Blood Glucose.: 139 mg/dL (2022 16:37)    Physical Exam:  General: NAD; A&Ox3/Patient is intubated and sedated  Cardiac: S1/S2, RRR, no murmur, no rubs  Lungs: unlabored respirations, CTA b/l, no wheeze, no rales, no crackles  Abdomen: Soft/NT/ND; positive bowel sounds x 4  Sternum: Intact, no click, incision healing well with no drainage  Incisions: Incisions clean/dry/intact  Extremities: No edema b/l lower extremities; good capillary refill; no cyanosis; palpable 1+ pedal pulses b/l    Central Venous Catheter: Yes[]  No[] , If Yes indication:           Day #  [place]reilly Catheter: Yes  [] , No  [] , If yes indication:                      Day #  NGT: Yes [] No [] ,    If Yes Placement:                                     Day #  EPICARDIAL WIRES:  [] YES [] NO                                              Day #  BOWEL MOVEMENT:  [] YES [] NO, If No, Timing since last BM:      Day #  CHEST TUBE(Left/Right):  [] YES [] NO, If yes -  AIR LEAKS:  [] YES [] NO        LABS:                        12.6<L>  3.17<L> )-----------( 204      ( 2022 04:44 )             39.8<L>                        13.2<L>  3.73<L> )-----------( 221      ( 2022 07:00 )             40.3<L>    07    139  |  103  |  13  ----------------------------<  133<H>  3.5   |  26  |  0.5<L>  29    138  |  103  |  15  ----------------------------<  175<H>  3.5   |  25  |  0.6<L>    Ca    9.2      2022 04:44    TPro  6.4 [6.0 - 8.0]  /  Alb  4.4 [3.5 - 5.2]  /  TBili  0.3 [0.2 - 1.2]  /  DBili  x   /  AST  31 [0 - 41]  /  ALT  49<H> [0 - 41]  /  AlkPhos  47 [30 - 115]  07-30    PT/INR - ( 2022 04:44 )   PT: ;   INR: 1.08 ratio         PTT - ( 2022 04:44 )  PTT:36.7 sec  Urinalysis Basic - ( 2022 15:40 )    Color: Light Yellow / Appearance: Clear / S.021 / pH: x  Gluc: x / Ketone: Negative  / Bili: Negative / Urobili: <2 mg/dL   Blood: x / Protein: Negative / Nitrite: Negative   Leuk Esterase: Negative / RBC: x / WBC x   Sq Epi: x / Non Sq Epi: x / Bacteria: x        RADIOLOGY & ADDITIONAL TESTS:  CXR:  EKG:  MEDICATIONS  (STANDING):  aspirin enteric coated 325 milliGRAM(s) Oral daily  enoxaparin Injectable 80 milliGRAM(s) SubCutaneous every 12 hours  famotidine    Tablet 20 milliGRAM(s) Oral two times a day  metoprolol tartrate 25 milliGRAM(s) Oral every 12 hours  simvastatin 40 milliGRAM(s) Oral at bedtime  sodium chloride 0.9% lock flush 3 milliLiter(s) IV Push every 8 hours  sodium chloride 0.9%. 1000 milliLiter(s) (150 mL/Hr) IV Continuous <Continuous>    MEDICATIONS  (PRN):    HEPARIN:  [] YES [] NO  Dose: XX UNITS/HR UNITS Q8H  LOVENOX:[] YES [] NO  Dose: XX mg Q24H  COUMADIN: []  YES [] NO  Dose: XX mg  Q24H  SCD's: YES b/l  GI Prophylaxis: Protonix [], Pepcid [], None [], (Contra-indication:.....)    Post-Op Beta-Blockers: Yes [], No[], If No, then contraindication:  Post-Op Aspirin: Yes [],  No [], If No, then contraindication:  Post-Op Statin: Yes [], No[], If No, then contraindication:  Allergies    No Known Allergies    Intolerances      Ambulation/Activity Status:    Assessment/Plan:  52y Male status-post .....  - Case and plan discussed with CTU Intensivist and CT Surgeon - Dr. Galo/Jessica/Matthew  - Continue CTU supportive care    - Continue DVT/GI prophylaxis  - Incentive Spirometry 10 times an hour  - Continue to advance physical activity as tolerated and continue PT/OT as directed  1. CAD: Continue ASA, statin, BB  2. HTN:   3. A. Fib:   4. COPD/Hypoxia:   5. DM/Glucose Control:     Social Service Disposition:     OPERATIVE PROCEDURE(s): pre-op cabg                                    52yMale  SURGEON(s): LAISHA Lopez  SUBJECTIVE ASSESSMENT: pt seen and examined. no acute complaints at this time   Vital Signs Last 24 Hrs  T(F): 98 (2022 08:00), Max: 98.2 (2022 04:00)  HR: 73 (2022 08:00) (65 - 87)  BP: 101/62 (2022 08:00) (96/55 - 140/88)  BP(mean): 77 (2022 08:00) (70 - 111)  RR: 23 (2022 08:00) (5 - 39)  SpO2: 95% (2022 08:00) (94% - 97%)      I&O's Detail    2022 07:01  -  2022 07:00  --------------------------------------------------------  IN:    Oral Fluid: 150 mL    sodium chloride 0.9%: 300 mL  Total IN: 450 mL    OUT:    Voided (mL): 1550 mL  Total OUT: 1550 mL        Net:   I&O's Detail    2022 07:01  -  2022 07:00  --------------------------------------------------------  Total NET: -1100 mL        CAPILLARY BLOOD GLUCOSE      POCT Blood Glucose.: 138 mg/dL (2022 07:10)  POCT Blood Glucose.: 122 mg/dL (2022 22:04)  POCT Blood Glucose.: 139 mg/dL (2022 16:37)    Physical Exam:  General: NAD; A&Ox3  Cardiac: S1/S2, RRR, no murmur, no rubs  Lungs: unlabored respirations, CTA b/l, no wheeze, no rales, no crackles  Abdomen: Soft/NT/ND; positive bowel sounds x 4  Extremities: No edema b/l lower extremities; good capillary refill; no cyanosis; palpable 1+ pedal pulses b/l          LABS:                        12.6<L>  3.17<L> )-----------( 204      ( 2022 04:44 )             39.8<L>                        13.2<L>  3.73<L> )-----------( 221      ( 2022 07:00 )             40.3<L>    07    139  |  103  |  13  ----------------------------<  133<H>  3.5   |  26  |  0.5<L>      138  |  103  |  15  ----------------------------<  175<H>  3.5   |  25  |  0.6<L>    Ca    9.2      2022 04:44    TPro  6.4 [6.0 - 8.0]  /  Alb  4.4 [3.5 - 5.2]  /  TBili  0.3 [0.2 - 1.2]  /  DBili  x   /  AST  31 [0 - 41]  /  ALT  49<H> [0 - 41]  /  AlkPhos  47 [30 - 115]  07-30    PT/INR - ( 2022 04:44 )   PT: ;   INR: 1.08 ratio         PTT - ( 2022 04:44 )  PTT:36.7 sec  Urinalysis Basic - ( 2022 15:40 )    Color: Light Yellow / Appearance: Clear / S.021 / pH: x  Gluc: x / Ketone: Negative  / Bili: Negative / Urobili: <2 mg/dL   Blood: x / Protein: Negative / Nitrite: Negative   Leuk Esterase: Negative / RBC: x / WBC x   Sq Epi: x / Non Sq Epi: x / Bacteria: x      MEDICATIONS  (STANDING):  aspirin enteric coated 325 milliGRAM(s) Oral daily  enoxaparin Injectable 80 milliGRAM(s) SubCutaneous every 12 hours  famotidine    Tablet 20 milliGRAM(s) Oral two times a day  metoprolol tartrate 25 milliGRAM(s) Oral every 12 hours  simvastatin 40 milliGRAM(s) Oral at bedtime  sodium chloride 0.9% lock flush 3 milliLiter(s) IV Push every 8 hours  sodium chloride 0.9%. 1000 milliLiter(s) (150 mL/Hr) IV Continuous <Continuous>    MEDICATIONS  (PRN):      Allergies    No Known Allergies    Intolerances      Ambulation/Activity Status: ambulate     Assessment/Plan:  52y Male pre-op for CABG early next week  - Case and plan discussed with CTU Intensivist and CT Surgeon - Dr. Galo/Jessica/Matthew  - Continue CTU supportive care    - Continue DVT/GI prophylaxis  - Incentive Spirometry 10 times an hour  - Continue to advance physical activity as tolerated and continue PT/OT as directed  1. CAD: Continue ASA, statin, BB  2. pre-op work up in progress   3. OR monday

## 2022-07-30 NOTE — PATIENT PROFILE ADULT - FALL HARM RISK - HARM RISK INTERVENTIONS

## 2022-07-31 LAB
ABO RH CONFIRMATION: SIGNIFICANT CHANGE UP
ALBUMIN SERPL ELPH-MCNC: 4.4 G/DL — SIGNIFICANT CHANGE UP (ref 3.5–5.2)
ALP SERPL-CCNC: 48 U/L — SIGNIFICANT CHANGE UP (ref 30–115)
ALT FLD-CCNC: 55 U/L — HIGH (ref 0–41)
ANION GAP SERPL CALC-SCNC: 11 MMOL/L — SIGNIFICANT CHANGE UP (ref 7–14)
AST SERPL-CCNC: 38 U/L — SIGNIFICANT CHANGE UP (ref 0–41)
BILIRUB SERPL-MCNC: 0.3 MG/DL — SIGNIFICANT CHANGE UP (ref 0.2–1.2)
BLD GP AB SCN SERPL QL: SIGNIFICANT CHANGE UP
BUN SERPL-MCNC: 12 MG/DL — SIGNIFICANT CHANGE UP (ref 10–20)
CALCIUM SERPL-MCNC: 8.8 MG/DL — SIGNIFICANT CHANGE UP (ref 8.5–10.1)
CHLORIDE SERPL-SCNC: 106 MMOL/L — SIGNIFICANT CHANGE UP (ref 98–110)
CO2 SERPL-SCNC: 24 MMOL/L — SIGNIFICANT CHANGE UP (ref 17–32)
CREAT SERPL-MCNC: 0.5 MG/DL — LOW (ref 0.7–1.5)
EGFR: 123 ML/MIN/1.73M2 — SIGNIFICANT CHANGE UP
GLUCOSE BLDC GLUCOMTR-MCNC: 127 MG/DL — HIGH (ref 70–99)
GLUCOSE BLDC GLUCOMTR-MCNC: 131 MG/DL — HIGH (ref 70–99)
GLUCOSE BLDC GLUCOMTR-MCNC: 145 MG/DL — HIGH (ref 70–99)
GLUCOSE BLDC GLUCOMTR-MCNC: 161 MG/DL — HIGH (ref 70–99)
GLUCOSE SERPL-MCNC: 147 MG/DL — HIGH (ref 70–99)
POTASSIUM SERPL-MCNC: 3.7 MMOL/L — SIGNIFICANT CHANGE UP (ref 3.5–5)
POTASSIUM SERPL-SCNC: 3.7 MMOL/L — SIGNIFICANT CHANGE UP (ref 3.5–5)
PROT SERPL-MCNC: 6.4 G/DL — SIGNIFICANT CHANGE UP (ref 6–8)
SODIUM SERPL-SCNC: 141 MMOL/L — SIGNIFICANT CHANGE UP (ref 135–146)

## 2022-07-31 PROCEDURE — ZZZZZ: CPT

## 2022-07-31 PROCEDURE — 94010 BREATHING CAPACITY TEST: CPT | Mod: 26

## 2022-07-31 RX ORDER — ZOLPIDEM TARTRATE 10 MG/1
5 TABLET ORAL ONCE
Refills: 0 | Status: DISCONTINUED | OUTPATIENT
Start: 2022-07-31 | End: 2022-07-31

## 2022-07-31 RX ORDER — METOPROLOL TARTRATE 50 MG
25 TABLET ORAL ONCE
Refills: 0 | Status: COMPLETED | OUTPATIENT
Start: 2022-07-31 | End: 2022-07-31

## 2022-07-31 RX ORDER — CHLORHEXIDINE GLUCONATE 213 G/1000ML
15 SOLUTION TOPICAL ONCE
Refills: 0 | Status: COMPLETED | OUTPATIENT
Start: 2022-07-31 | End: 2022-07-31

## 2022-07-31 RX ORDER — POTASSIUM CHLORIDE 20 MEQ
20 PACKET (EA) ORAL EVERY 4 HOURS
Refills: 0 | Status: COMPLETED | OUTPATIENT
Start: 2022-07-31 | End: 2022-07-31

## 2022-07-31 RX ORDER — CHLORHEXIDINE GLUCONATE 213 G/1000ML
1 SOLUTION TOPICAL ONCE
Refills: 0 | Status: COMPLETED | OUTPATIENT
Start: 2022-07-31 | End: 2022-07-31

## 2022-07-31 RX ADMIN — SODIUM CHLORIDE 3 MILLILITER(S): 9 INJECTION INTRAMUSCULAR; INTRAVENOUS; SUBCUTANEOUS at 16:22

## 2022-07-31 RX ADMIN — CHLORHEXIDINE GLUCONATE 1 APPLICATION(S): 213 SOLUTION TOPICAL at 22:17

## 2022-07-31 RX ADMIN — SIMVASTATIN 40 MILLIGRAM(S): 20 TABLET, FILM COATED ORAL at 21:07

## 2022-07-31 RX ADMIN — SODIUM CHLORIDE 3 MILLILITER(S): 9 INJECTION INTRAMUSCULAR; INTRAVENOUS; SUBCUTANEOUS at 22:16

## 2022-07-31 RX ADMIN — FAMOTIDINE 20 MILLIGRAM(S): 10 INJECTION INTRAVENOUS at 05:38

## 2022-07-31 RX ADMIN — ZOLPIDEM TARTRATE 5 MILLIGRAM(S): 10 TABLET ORAL at 21:07

## 2022-07-31 RX ADMIN — Medication 20 MILLIEQUIVALENT(S): at 09:27

## 2022-07-31 RX ADMIN — SODIUM CHLORIDE 3 MILLILITER(S): 9 INJECTION INTRAMUSCULAR; INTRAVENOUS; SUBCUTANEOUS at 06:15

## 2022-07-31 RX ADMIN — CHLORHEXIDINE GLUCONATE 15 MILLILITER(S): 213 SOLUTION TOPICAL at 22:15

## 2022-07-31 RX ADMIN — Medication 20 MILLIEQUIVALENT(S): at 14:45

## 2022-07-31 RX ADMIN — FAMOTIDINE 20 MILLIGRAM(S): 10 INJECTION INTRAVENOUS at 17:45

## 2022-07-31 RX ADMIN — Medication 81 MILLIGRAM(S): at 13:26

## 2022-07-31 RX ADMIN — Medication 25 MILLIGRAM(S): at 21:07

## 2022-07-31 RX ADMIN — Medication 25 MILLIGRAM(S): at 05:38

## 2022-07-31 NOTE — PROGRESS NOTE ADULT - SUBJECTIVE AND OBJECTIVE BOX
OPERATIVE PROCEDURE(s):          pre-op CAB G                       52yMale  SURGEON(s): LAISHA Lopez  SUBJECTIVE ASSESSMENT: patient seen and examined at bedside. No acute events   Vital Signs Last 24 Hrs  T(F): 97 (2022 20:00), Max: 97.5 (2022 12:00)  HR: 63 (2022 05:30) (59 - 77)  BP: 132/77 (2022 05:30) (122/76 - 139/86)  BP(mean): 99 (2022 05:30) (93 - 107)  RR: 15 (2022 05:30) (15 - 21)  SpO2: 95% (2022 05:30) (95% - 96%)    I&O's Detail    2022 07:01  -  2022 07:00  --------------------------------------------------------  IN:    Oral Fluid: 580 mL  Total IN: 580 mL    OUT:    Voided (mL): 2550 mL  Total OUT: 2550 mL        Net:   I&O's Detail    2022 07:01  -  2022 07:00  --------------------------------------------------------  Total NET: -1100 mL      2022 07:01  -  2022 07:00  --------------------------------------------------------  Total NET: -1970 mL        CAPILLARY BLOOD GLUCOSE      POCT Blood Glucose.: 145 mg/dL (2022 06:57)  POCT Blood Glucose.: 126 mg/dL (2022 21:32)  POCT Blood Glucose.: 180 mg/dL (2022 16:47)  POCT Blood Glucose.: 237 mg/dL (2022 16:43)  POCT Blood Glucose.: 112 mg/dL (2022 11:02)    Physical Exam:  General: NAD; A&Ox3/Patient is intubated and sedated  Cardiac: S1/S2, RRR, no murmur, no rubs  Lungs: unlabored respirations, CTA b/l, no wheeze, no rales, no crackles  Abdomen: Soft/NT/ND; positive bowel sounds x 4  Sternum: Intact, no click, incision healing well with no drainage  Incisions: Incisions clean/dry/intact  Extremities: No edema b/l lower extremities; good capillary refill; no cyanosis; palpable 1+ pedal pulses b/l    Central Venous Catheter: Yes[]  No[X] , If Yes indication:           Day #  [place] Catheter: Yes  [] , No  [X] , If yes indication:                      Day #  BOWEL MOVEMENT:  [X] YES [] NO, If No, Timing since last BM:      Day #      LABS:                        12.6<L>  3.17<L> )-----------( 204      ( 2022 04:44 )             39.8<L>                        13.2<L>  3.73<L> )-----------( 221      ( 2022 07:00 )             40.3<L>        141  |  106  |  12  ----------------------------<  147<H>  3.7   |  24  |  0.5<L>      139  |  103  |  13  ----------------------------<  133<H>  3.5   |  26  |  0.5<L>    Ca    8.8      2022 05:55    TPro  6.4 [6.0 - 8.0]  /  Alb  4.4 [3.5 - 5.2]  /  TBili  0.3 [0.2 - 1.2]  /  DBili  x   /  AST  38 [0 - 41]  /  ALT  55<H> [0 - 41]  /  AlkPhos  48 [30 - 115]  0731    PT/INR - ( 2022 04:44 )   PT: ;   INR: 1.08 ratio         PTT - ( 2022 04:44 )  PTT:36.7 sec  Urinalysis Basic - ( 2022 15:40 )    Color: Light Yellow / Appearance: Clear / S.021 / pH: x  Gluc: x / Ketone: Negative  / Bili: Negative / Urobili: <2 mg/dL   Blood: x / Protein: Negative / Nitrite: Negative   Leuk Esterase: Negative / RBC: x / WBC x   Sq Epi: x / Non Sq Epi: x / Bacteria: x        RADIOLOGY & ADDITIONAL TESTS:  CXR:  EKG:  MEDICATIONS  (STANDING):  aspirin enteric coated 81 milliGRAM(s) Oral daily  dextrose 5%. 1000 milliLiter(s) (100 mL/Hr) IV Continuous <Continuous>  dextrose 5%. 1000 milliLiter(s) (50 mL/Hr) IV Continuous <Continuous>  dextrose 50% Injectable 25 Gram(s) IV Push once  dextrose 50% Injectable 12.5 Gram(s) IV Push once  dextrose 50% Injectable 25 Gram(s) IV Push once  famotidine    Tablet 20 milliGRAM(s) Oral two times a day  glucagon  Injectable 1 milliGRAM(s) IntraMuscular once  insulin lispro (ADMELOG) corrective regimen sliding scale   SubCutaneous three times a day before meals  metoprolol tartrate 25 milliGRAM(s) Oral every 12 hours  simvastatin 40 milliGRAM(s) Oral at bedtime  sodium chloride 0.9% lock flush 3 milliLiter(s) IV Push every 8 hours  sodium chloride 0.9%. 1000 milliLiter(s) (150 mL/Hr) IV Continuous <Continuous>    MEDICATIONS  (PRN):  dextrose Oral Gel 15 Gram(s) Oral once PRN Blood Glucose LESS THAN 70 milliGRAM(s)/deciliter    HEPARIN:  [] YES [] NO  Dose: XX UNITS/HR UNITS Q8H  LOVENOX:[] YES [] NO  Dose: XX mg Q24H  COUMADIN: []  YES [] NO  Dose: XX mg  Q24H  SCD's: YES b/l  GI Prophylaxis: Protonix [], Pepcid [], None [], (Contra-indication:.....)    Post-Op Beta-Blockers: Yes [], No[], If No, then contraindication:  Post-Op Aspirin: Yes [],  No [], If No, then contraindication:  Post-Op Statin: Yes [], No[], If No, then contraindication:  Allergies    No Known Allergies    Intolerances      Ambulation/Activity Status:    Assessment/Plan:  52y Male status-post .....  - Case and plan discussed with CTU Intensivist and CT Surgeon - Dr. Galo/Jessica/Matthew  - Continue CTU supportive care    - Continue DVT/GI prophylaxis  - Incentive Spirometry 10 times an hour  - Continue to advance physical activity as tolerated and continue PT/OT as directed  1. CAD: Continue ASA, statin, BB  2. HTN:   3. A. Fib:   4. COPD/Hypoxia:   5. DM/Glucose Control:   6. respiratory: f/u PFTs       Social Service Disposition:       Cardiac Surgery Pre-op Note:  CC: Patient is a 52y old  Male who presents with a chief complaint of left main coronary artery disease (2022 11:27)      Referring Physician:   Dr Horner                                                                                          Surgeon: Dr Galo  Procedure: 2022 CABG     Allergies    No Known Allergies    Intolerances      HPI:  51 y/o M PMH:  bladder cancer, DM, ED, HTN, HLD, Covid , ex-smoker, + FH MI                PSH: bladder tumor resection, umbilical hernia repair    Pt reports SOB and CP with exertion , unable to walk few blocks d/t chest pain, sometimes has L arm pain, pt had submaximal stress test, LHC recommended for exertional chest pain increasing in frequency    cc demonstrated left main disease with multi-vessel involvement        PAST MEDICAL & SURGICAL HISTORY:  HTN (hypertension)      Diabetes      Bladder tumor      Hypercholesterolemia      Lung nodules      Hepatic steatosis      Erectile dysfunction      2019 novel coronavirus disease (COVID-19)        History of hernia repair      History of bladder surgery  TURBT X2          MEDICATIONS  (STANDING):  aspirin enteric coated 81 milliGRAM(s) Oral daily  dextrose 5%. 1000 milliLiter(s) (50 mL/Hr) IV Continuous <Continuous>  dextrose 5%. 1000 milliLiter(s) (100 mL/Hr) IV Continuous <Continuous>  dextrose 50% Injectable 25 Gram(s) IV Push once  dextrose 50% Injectable 12.5 Gram(s) IV Push once  dextrose 50% Injectable 25 Gram(s) IV Push once  famotidine    Tablet 20 milliGRAM(s) Oral two times a day  glucagon  Injectable 1 milliGRAM(s) IntraMuscular once  insulin lispro (ADMELOG) corrective regimen sliding scale   SubCutaneous three times a day before meals  metoprolol tartrate 25 milliGRAM(s) Oral every 12 hours  potassium chloride    Tablet ER 20 milliEquivalent(s) Oral every 4 hours  simvastatin 40 milliGRAM(s) Oral at bedtime  sodium chloride 0.9% lock flush 3 milliLiter(s) IV Push every 8 hours  sodium chloride 0.9%. 1000 milliLiter(s) (150 mL/Hr) IV Continuous <Continuous>    MEDICATIONS  (PRN):  dextrose Oral Gel 15 Gram(s) Oral once PRN Blood Glucose LESS THAN 70 milliGRAM(s)/deciliter      Labs:                        12.6   3.17  )-----------( 204      ( 2022 04:44 )             39.8     07-31    141  |  106  |  12  ----------------------------<  147<H>  3.7   |  24  |  0.5<L>    Ca    8.8      2022 05:55    TPro  6.4  /  Alb  4.4  /  TBili  0.3  /  DBili  x   /  AST  38  /  ALT  55<H>  /  AlkPhos  48      PT/INR - ( 2022 04:44 )   PT: 12.40 sec;   INR: 1.08 ratio         PTT - ( 2022 04:44 )  PTT:36.7 sec  Covid: COVID-19 PCR: NotDetec (22 @ 13:36)      Blood Type:   HGB A1C: 6.9  Prealbumin:   Pro-BNP: Serum Pro-Brain Natriuretic Peptide: 10 pg/mL ( @ 04:44)    Thyroid Panel:   MRSA: MRSA PCR Result.: Negative ( @ 15:40)   / MSSA:   Urinalysis Basic - ( 2022 15:40 )    Color: Light Yellow / Appearance: Clear / S.021 / pH: x  Gluc: x / Ketone: Negative  / Bili: Negative / Urobili: <2 mg/dL   Blood: x / Protein: Negative / Nitrite: Negative   Leuk Esterase: Negative / RBC: x / WBC x   Sq Epi: x / Non Sq Epi: x / Bacteria: x        CXR: `< from: Xray Chest 1 View AP/PA (22 @ 05:57) >  FINDINGS/  IMPRESSION:    No focal consolidation, pneumothorax or pleural effusion.    Stable cardiomediastinal silhouette.    No radiographically evident acute displaced fracture within the   limitations of this exam.    --- End of Report ---        < end of copied text >      EKG: `< from: 12 Lead ECG (22 @ 07:40) >    Ventricular Rate 76 BPM    Atrial Rate 76 BPM    P-R Interval 196 ms    QRS Duration 90 ms    Q-T Interval 382 ms    QTC Calculation(Bazett) 429 ms    P Axis 59 degrees    R Axis 11 degrees    T Axis 63 degrees    Diagnosis Line Normal sinus rhythm  Normal ECG    Confirmed by Charles Kwon ([id number]) on 2022 6:17:22 AM    < end of copied text >      Carotid Duplex:  `< from: VA Duplex Carotid, Bilat (22 @ 13:24) >  IMPRESSION: Mild 20-39% stenosis in bilateral internal carotid artery    Measurement of carotid stenosis is based on velocity parameters that   correlate the residual internal carotid diameter with that of the more   distal vessel in accordance with a method such as the North American   Symptomatic Carotid Endarterectomy Trial (NASCET).    --- End of Report ---    < end of copied text >      PFT's:     Echocardiogram: `< from: TTE Echo Complete w/o Contrast w/ Doppler (22 @ 14:29) >  Summary:   1. Normalglobal left ventricular systolic function.   2. LV Ejection Fraction by Pichardo's Method with a biplane EF of 61 %.   3. Normal left ventricular internal cavity size.   4. The left ventricular diastolic function could not be assessed in this   study.   5. Mildly enlarged left atrium.   6. Normal right atrial size.   7. Trace mitral valve regurgitation.   8. The ascending aorta is mildly dilated to a diameter of 4.0 cm.   9. LA volume Index is 39.2 ml/m² ml/m2.    PHYSICIAN INTERPRETATION:  Left Ventricle: The left ventricular internal cavity size is normal. Left   ventricular wall thickness is normal. There is no left ventricular   hypertrophy. Global LV systolic function was normal. The left ventricular   diastolic function could not be assessed in this study.      LV Wall Scoring:  All segments are normal.    Right Ventricle: Normal right ventricular size and function.  Left Atrium: Mildly enlarged left atrium. LA volume Index is 39.2 ml/m²   ml/m2.  Right Atrium: Normal right atrial size. RA Area is 15.31 cm² cm2.  Pericardium: There is no evidence of pericardial effusion.  Mitral Valve: Structurally normal mitral valve, with normal leaflet   excursion. Trace mitral valve regurgitation is seen.  Tricuspid Valve: Structurally normaltricuspid valve, with normal leaflet   excursion. Trivial tricuspid regurgitation is visualized.  Aortic Valve: Normal trileaflet aortic valve with normal opening. No   evidence of aortic valve regurgitation is seen.  Pulmonic Valve: Structurally normal pulmonic valve, with normal leaflet   excursion. Trace pulmonic valve regurgitation.  Aorta: The ascending aorta is mildly dilated to a diameter of 4.0 cm.  Pulmonary Artery: The main pulmonary artery is normal in size.  Venous: The inferior vena cava was dilated, with respiratory size   variation less than 50%.      2D AND M-MODE MEASUREMENTS (normal ranges within parentheses):  Left Ventricle:                  Normal   Aorta/Left Atrium:              Normal  IVSd (2D):              0.84 cm (0.7-1.1) LA Volume Index    39.2 ml/m²  LVPWd (2D):             0.56 cm (0.7-1.1) Right Ventricle:  LVIDd (2D):             4.35 cm (3.4-5.7) TAPSE:           2.10 cm  LVIDs (2D):             2.57 cm  LV FS (2D):             40.9 %   (>25%)  Relative Wall Thickness  0.26    (<0.42)    SPECTRAL DOPPLER ANALYSIS:  LV DIASTOLIC FUNCTION:  MV Peak E: 0.78 m/s Decel Time: 209 msec  MV Peak A: 1.00 m/s  E/A Ratio: 0.78    Aortic Valve:  AoV VMax:    1.61 m/s  AoV Area, Vmax: 2.24 cm² Vmax Indx: 1.14 cm²/m²  AoV VTI:     0.34 m    AoV Area, VTI:  2.44 cm² VTI Indx:  1.24 cm²/m²  AoV Pk Grad: 10.4 mmHg  AoV Mn Grad: 5.5 mmHg    LVOT Vmax: 1.02 m/s  LVOT VTI:  0.23 m  LVOT Diam: 2.12 cm    Mitral Valve:  MV P1/2 Time: 60.68 msec  MV Area, PHT: 3.63 cm²    Tricuspid Valve and PA/RV Systolic Pressure: TR Max Velocity: 1.84 m/s RA   Pressure: 15 mmHg RVSP/PASP: 28.5 mmHg    Pulmonic Valve:  PV Max Velocity: 1.00 m/s PV Max P.0 mmHg PV Mean P Naun Goyal MD, Electronically signed on 2022 at   3:01:56 PM    < end of copied text >      Cardiac catheterization:   LM: Mild LM disease.  MLA 6.8    LAD: 90% mid LAD lesion. 90% ostial Diag lesion    CX: 50-60% prox Lcx. 90% stenosis in distal Lcx    RCA: 90% diffuse prox RCA calcified lesion. 80% diffuse Mid RCA lesion EVELINA 2 flow    `ICU Vital Signs Last 24 Hrs  T(C): 36.8 (2022 10:26), Max: 36.8 (2022 10:26)  T(F): 98.2 (2022 10:26), Max: 98.2 (2022 10:26)  HR: 73 (2022 10:26) (59 - 75)  BP: 154/87 (2022 10:26) (122/76 - 154/87)  BP(mean): 99 (2022 05:30) (93 - 107)  RR: 16 (2022 10:26) (15 - 16)  SpO2: 95% (2022 10:26) (95% - 95%)    O2 Parameters below as of 2022 10:26  Patient On (Oxygen Delivery Method): room air    Right Arm /84  Left Arm /85    5 METER frailty   1: 4.6  2: 3.6  3: 4.0           Gen: WN/WD NAD  Neuro: A&Ox3, nonfocal  Pulm: CTA B/L  CV: RRR, S1S2   Abd: Soft, NT, ND +BS  Ext: No edema, + peripheral pulses    Cardiac Surgery Risk Factors  CVA and/or carotid/cerebrovascular disease. Yes  No  Explain if Yes  Aortoiliac disease Yes  No  Explain if Yes  Previous MI Yes  No  Explain if Yes  Previous Cardiac Surgery Yes  No  Explain if Yes  Hemodynamics-Unstable or Shock Yes  No  Explain if Yes  Diabetes Yes  No  Explain if Yes A1c 6.9  Hepatic Failure Yes  No  Explain if Yes  Renal failure and/or dialysis Yes  No  Explain if Yes  Heart failure-type-present or past Yes  No  Explain if Yes  COPD Yes  No  Explain if Yes;   Immune System Deficiency Yes  No  Explain if Yes  Malignant Ventricular Arrhythmia Yes  No  Explain if Yes    STS Score:     Pt has AICD/PPM [ ] Yes  [x ] No             Brand Name:  Pre-op Beta Blocker ordered within 24 hrs of surgery (CABG ONLY)?  [x ] Yes  [ ] No  If not, Why?  Type & Cross  [ ] Yes  [ ] No  NPO after Midnight [x ] Yes  [ ] No  Pre-op ABX ordered, to be taped on chart:  [ x] Yes  [ ] No     Hibiclens/Peridex ordered [x ] Yes  [ ] No  Intraop on Hold: PRBCs, CXR, NAINA [x ]   Consent obtained  [x ] Yes  [ ] No                 Cardiac Surgery Pre-op Note:  CC: Patient is a 52y old  Male who presents with a chief complaint of left main coronary artery disease (2022 11:27)      Referring Physician:   Dr Horner                                                                                          Surgeon: Dr Galo  Procedure: 2022 CABG     Allergies    No Known Allergies    Intolerances      HPI:  53 y/o M PMH:  bladder cancer, DM, ED, HTN, HLD, Covid , ex-smoker, + FH MI                PSH: bladder tumor resection, umbilical hernia repair    Pt reports SOB and CP with exertion , unable to walk few blocks d/t chest pain, sometimes has L arm pain, pt had submaximal stress test, LHC recommended for exertional chest pain increasing in frequency    cc demonstrated left main disease with multi-vessel involvement        PAST MEDICAL & SURGICAL HISTORY:  HTN (hypertension)      Diabetes      Bladder tumor      Hypercholesterolemia      Lung nodules      Hepatic steatosis      Erectile dysfunction      2019 novel coronavirus disease (COVID-19)        History of hernia repair      History of bladder surgery  TURBT X2          MEDICATIONS  (STANDING):  aspirin enteric coated 81 milliGRAM(s) Oral daily  dextrose 5%. 1000 milliLiter(s) (50 mL/Hr) IV Continuous <Continuous>  dextrose 5%. 1000 milliLiter(s) (100 mL/Hr) IV Continuous <Continuous>  dextrose 50% Injectable 25 Gram(s) IV Push once  dextrose 50% Injectable 12.5 Gram(s) IV Push once  dextrose 50% Injectable 25 Gram(s) IV Push once  famotidine    Tablet 20 milliGRAM(s) Oral two times a day  glucagon  Injectable 1 milliGRAM(s) IntraMuscular once  insulin lispro (ADMELOG) corrective regimen sliding scale   SubCutaneous three times a day before meals  metoprolol tartrate 25 milliGRAM(s) Oral every 12 hours  potassium chloride    Tablet ER 20 milliEquivalent(s) Oral every 4 hours  simvastatin 40 milliGRAM(s) Oral at bedtime  sodium chloride 0.9% lock flush 3 milliLiter(s) IV Push every 8 hours  sodium chloride 0.9%. 1000 milliLiter(s) (150 mL/Hr) IV Continuous <Continuous>    MEDICATIONS  (PRN):  dextrose Oral Gel 15 Gram(s) Oral once PRN Blood Glucose LESS THAN 70 milliGRAM(s)/deciliter      Labs:                        12.6   3.17  )-----------( 204      ( 2022 04:44 )             39.8     07-31    141  |  106  |  12  ----------------------------<  147<H>  3.7   |  24  |  0.5<L>    Ca    8.8      2022 05:55    TPro  6.4  /  Alb  4.4  /  TBili  0.3  /  DBili  x   /  AST  38  /  ALT  55<H>  /  AlkPhos  48      PT/INR - ( 2022 04:44 )   PT: 12.40 sec;   INR: 1.08 ratio         PTT - ( 2022 04:44 )  PTT:36.7 sec  Covid: COVID-19 PCR: NotDetec (22 @ 13:36)      Blood Type:   HGB A1C: 6.9  Prealbumin:   Pro-BNP: Serum Pro-Brain Natriuretic Peptide: 10 pg/mL ( @ 04:44)    Thyroid Panel:   MRSA: MRSA PCR Result.: Negative ( @ 15:40)   / MSSA:   Urinalysis Basic - ( 2022 15:40 )    Color: Light Yellow / Appearance: Clear / S.021 / pH: x  Gluc: x / Ketone: Negative  / Bili: Negative / Urobili: <2 mg/dL   Blood: x / Protein: Negative / Nitrite: Negative   Leuk Esterase: Negative / RBC: x / WBC x   Sq Epi: x / Non Sq Epi: x / Bacteria: x        CXR: `< from: Xray Chest 1 View AP/PA (22 @ 05:57) >  FINDINGS/  IMPRESSION:    No focal consolidation, pneumothorax or pleural effusion.    Stable cardiomediastinal silhouette.    No radiographically evident acute displaced fracture within the   limitations of this exam.    --- End of Report ---        < end of copied text >      EKG: `< from: 12 Lead ECG (22 @ 07:40) >    Ventricular Rate 76 BPM    Atrial Rate 76 BPM    P-R Interval 196 ms    QRS Duration 90 ms    Q-T Interval 382 ms    QTC Calculation(Bazett) 429 ms    P Axis 59 degrees    R Axis 11 degrees    T Axis 63 degrees    Diagnosis Line Normal sinus rhythm  Normal ECG    Confirmed by Charles Kwon ([id number]) on 2022 6:17:22 AM    < end of copied text >      Carotid Duplex:  `< from: VA Duplex Carotid, Bilat (22 @ 13:24) >  IMPRESSION: Mild 20-39% stenosis in bilateral internal carotid artery    Measurement of carotid stenosis is based on velocity parameters that   correlate the residual internal carotid diameter with that of the more   distal vessel in accordance with a method such as the North American   Symptomatic Carotid Endarterectomy Trial (NASCET).    --- End of Report ---    < end of copied text >      PFT's: FEV1 74%    Echocardiogram: `< from: TTE Echo Complete w/o Contrast w/ Doppler (22 @ 14:29) >  Summary:   1. Normalglobal left ventricular systolic function.   2. LV Ejection Fraction by Pichardo's Method with a biplane EF of 61 %.   3. Normal left ventricular internal cavity size.   4. The left ventricular diastolic function could not be assessed in this   study.   5. Mildly enlarged left atrium.   6. Normal right atrial size.   7. Trace mitral valve regurgitation.   8. The ascending aorta is mildly dilated to a diameter of 4.0 cm.   9. LA volume Index is 39.2 ml/m² ml/m2.    PHYSICIAN INTERPRETATION:  Left Ventricle: The left ventricular internal cavity size is normal. Left   ventricular wall thickness is normal. There is no left ventricular   hypertrophy. Global LV systolic function was normal. The left ventricular   diastolic function could not be assessed in this study.      LV Wall Scoring:  All segments are normal.    Right Ventricle: Normal right ventricular size and function.  Left Atrium: Mildly enlarged left atrium. LA volume Index is 39.2 ml/m²   ml/m2.  Right Atrium: Normal right atrial size. RA Area is 15.31 cm² cm2.  Pericardium: There is no evidence of pericardial effusion.  Mitral Valve: Structurally normal mitral valve, with normal leaflet   excursion. Trace mitral valve regurgitation is seen.  Tricuspid Valve: Structurally normaltricuspid valve, with normal leaflet   excursion. Trivial tricuspid regurgitation is visualized.  Aortic Valve: Normal trileaflet aortic valve with normal opening. No   evidence of aortic valve regurgitation is seen.  Pulmonic Valve: Structurally normal pulmonic valve, with normal leaflet   excursion. Trace pulmonic valve regurgitation.  Aorta: The ascending aorta is mildly dilated to a diameter of 4.0 cm.  Pulmonary Artery: The main pulmonary artery is normal in size.  Venous: The inferior vena cava was dilated, with respiratory size   variation less than 50%.      2D AND M-MODE MEASUREMENTS (normal ranges within parentheses):  Left Ventricle:                  Normal   Aorta/Left Atrium:              Normal  IVSd (2D):              0.84 cm (0.7-1.1) LA Volume Index    39.2 ml/m²  LVPWd (2D):             0.56 cm (0.7-1.1) Right Ventricle:  LVIDd (2D):             4.35 cm (3.4-5.7) TAPSE:           2.10 cm  LVIDs (2D):             2.57 cm  LV FS (2D):             40.9 %   (>25%)  Relative Wall Thickness  0.26    (<0.42)    SPECTRAL DOPPLER ANALYSIS:  LV DIASTOLIC FUNCTION:  MV Peak E: 0.78 m/s Decel Time: 209 msec  MV Peak A: 1.00 m/s  E/A Ratio: 0.78    Aortic Valve:  AoV VMax:    1.61 m/s  AoV Area, Vmax: 2.24 cm² Vmax Indx: 1.14 cm²/m²  AoV VTI:     0.34 m    AoV Area, VTI:  2.44 cm² VTI Indx:  1.24 cm²/m²  AoV Pk Grad: 10.4 mmHg  AoV Mn Grad: 5.5 mmHg    LVOT Vmax: 1.02 m/s  LVOT VTI:  0.23 m  LVOT Diam: 2.12 cm    Mitral Valve:  MV P1/2 Time: 60.68 msec  MV Area, PHT: 3.63 cm²    Tricuspid Valve and PA/RV Systolic Pressure: TR Max Velocity: 1.84 m/s RA   Pressure: 15 mmHg RVSP/PASP: 28.5 mmHg    Pulmonic Valve:  PV Max Velocity: 1.00 m/s PV Max P.0 mmHg PV Mean P Naun Goyal MD, Electronically signed on 2022 at   3:01:56 PM    < end of copied text >      Cardiac catheterization:   LM: Mild LM disease.  MLA 6.8    LAD: 90% mid LAD lesion. 90% ostial Diag lesion    CX: 50-60% prox Lcx. 90% stenosis in distal Lcx    RCA: 90% diffuse prox RCA calcified lesion. 80% diffuse Mid RCA lesion EVELINA 2 flow    `ICU Vital Signs Last 24 Hrs  T(C): 36.8 (2022 10:26), Max: 36.8 (2022 10:26)  T(F): 98.2 (2022 10:26), Max: 98.2 (2022 10:26)  HR: 73 (2022 10:26) (59 - 75)  BP: 154/87 (2022 10:26) (122/76 - 154/87)  BP(mean): 99 (2022 05:30) (93 - 107)  RR: 16 (2022 10:26) (15 - 16)  SpO2: 95% (2022 10:) (95% - 95%)    O2 Parameters below as of 2022 10:26  Patient On (Oxygen Delivery Method): room air    Right Arm /84  Left Arm /85    5 METER frailty   1: 4.6  2: 3.6  3: 4.0           Gen: WN/WD NAD  Neuro: A&Ox3, nonfocal  Pulm: CTA B/L  CV: RRR, S1S2   Abd: Soft, NT, ND +BS  Ext: No edema, + peripheral pulses    Cardiac Surgery Risk Factors  CVA and/or carotid/cerebrovascular disease. Yes  No  Explain if Yes  Aortoiliac disease Yes  No  Explain if Yes  Previous MI Yes  No  Explain if Yes  Previous Cardiac Surgery Yes  No  Explain if Yes  Hemodynamics-Unstable or Shock Yes  No  Explain if Yes  Diabetes Yes  No  Explain if Yes A1c 6.9  Hepatic Failure Yes  No  Explain if Yes  Renal failure and/or dialysis Yes  No  Explain if Yes  Heart failure-type-present or past Yes  No  Explain if Yes  COPD Yes  No  Explain if Yes; FEV1 74 %   Immune System Deficiency Yes  No  Explain if Yes  Malignant Ventricular Arrhythmia Yes  No  Explain if Yes    STS Score:   Risk of Mortality:  0.256%  Renal Failure:  0.348%  Permanent Stroke:  0.545%  Prolonged Ventilation:  2.225%  DSW Infection:  0.176%  Reoperation:  1.856%  Morbidity or Mortality:  3.772%  Short Length of Stay:  73.415%  Long Length of Stay:  1.036%    Pt has AICD/PPM [ ] Yes  [x ] No             Brand Name:  Pre-op Beta Blocker ordered within 24 hrs of surgery (CABG ONLY)?  [x ] Yes  [ ] No  If not, Why?  Type & Cross  [ ] Yes  [ ] No  NPO after Midnight [x ] Yes  [ ] No  Pre-op ABX ordered, to be taped on chart:  [ x] Yes  [ ] No     Hibiclens/Peridex ordered [x ] Yes  [ ] No  Intraop on Hold: PRBCs, CXR, NAINA [x ]   Consent obtained  [x ] Yes  [ ] No

## 2022-07-31 NOTE — PROGRESS NOTE ADULT - NS ATTEND AMEND GEN_ALL_CORE FT
The patient is a 52-year-old gentleman who was seen and examined on morning rounds as described above  Admitted with unstable angina  Found to have severe triple-vessel disease  Currently denies any chest pain or shortness of breath    Is being worked up for coronary artery bypass grafting surgery in the morning    I had a chance to review his cardiac catheterization and other work-up  Discussed this in detail with the patient  The patient himself had many questions regarding his operation and these were all answered.

## 2022-08-01 ENCOUNTER — TRANSCRIPTION ENCOUNTER (OUTPATIENT)
Age: 52
End: 2022-08-01

## 2022-08-01 LAB
ALBUMIN SERPL ELPH-MCNC: 4.4 G/DL — SIGNIFICANT CHANGE UP (ref 3.5–5.2)
ALP SERPL-CCNC: 30 U/L — SIGNIFICANT CHANGE UP (ref 30–115)
ALT FLD-CCNC: 66 U/L — HIGH (ref 0–41)
ANION GAP SERPL CALC-SCNC: 10 MMOL/L — SIGNIFICANT CHANGE UP (ref 7–14)
APTT BLD: 32.2 SEC — SIGNIFICANT CHANGE UP (ref 27–39.2)
AST SERPL-CCNC: 91 U/L — HIGH (ref 0–41)
BASOPHILS # BLD AUTO: 0.01 K/UL — SIGNIFICANT CHANGE UP (ref 0–0.2)
BASOPHILS NFR BLD AUTO: 0.1 % — SIGNIFICANT CHANGE UP (ref 0–1)
BILIRUB SERPL-MCNC: 0.3 MG/DL — SIGNIFICANT CHANGE UP (ref 0.2–1.2)
BUN SERPL-MCNC: 9 MG/DL — LOW (ref 10–20)
CALCIUM SERPL-MCNC: 7.9 MG/DL — LOW (ref 8.5–10.1)
CHLORIDE SERPL-SCNC: 111 MMOL/L — HIGH (ref 98–110)
CO2 SERPL-SCNC: 23 MMOL/L — SIGNIFICANT CHANGE UP (ref 17–32)
CREAT SERPL-MCNC: 0.6 MG/DL — LOW (ref 0.7–1.5)
EGFR: 116 ML/MIN/1.73M2 — SIGNIFICANT CHANGE UP
EOSINOPHIL # BLD AUTO: 0.05 K/UL — SIGNIFICANT CHANGE UP (ref 0–0.7)
EOSINOPHIL NFR BLD AUTO: 0.7 % — SIGNIFICANT CHANGE UP (ref 0–8)
GAS PNL BLDA: SIGNIFICANT CHANGE UP
GLUCOSE BLDC GLUCOMTR-MCNC: 113 MG/DL — HIGH (ref 70–99)
GLUCOSE BLDC GLUCOMTR-MCNC: 123 MG/DL — HIGH (ref 70–99)
GLUCOSE BLDC GLUCOMTR-MCNC: 128 MG/DL — HIGH (ref 70–99)
GLUCOSE BLDC GLUCOMTR-MCNC: 130 MG/DL — HIGH (ref 70–99)
GLUCOSE BLDC GLUCOMTR-MCNC: 131 MG/DL — HIGH (ref 70–99)
GLUCOSE BLDC GLUCOMTR-MCNC: 146 MG/DL — HIGH (ref 70–99)
GLUCOSE BLDC GLUCOMTR-MCNC: 150 MG/DL — HIGH (ref 70–99)
GLUCOSE BLDC GLUCOMTR-MCNC: 156 MG/DL — HIGH (ref 70–99)
GLUCOSE BLDC GLUCOMTR-MCNC: 162 MG/DL — HIGH (ref 70–99)
GLUCOSE BLDC GLUCOMTR-MCNC: 169 MG/DL — HIGH (ref 70–99)
GLUCOSE SERPL-MCNC: 129 MG/DL — HIGH (ref 70–99)
HCT VFR BLD CALC: 28.1 % — LOW (ref 42–52)
HCT VFR BLD CALC: 31 % — LOW (ref 42–52)
HGB BLD-MCNC: 10 G/DL — LOW (ref 14–18)
HGB BLD-MCNC: 9 G/DL — LOW (ref 14–18)
IMM GRANULOCYTES NFR BLD AUTO: 1.1 % — HIGH (ref 0.1–0.3)
INR BLD: 1.22 RATIO — SIGNIFICANT CHANGE UP (ref 0.65–1.3)
LYMPHOCYTES # BLD AUTO: 0.57 K/UL — LOW (ref 1.2–3.4)
LYMPHOCYTES # BLD AUTO: 7.7 % — LOW (ref 20.5–51.1)
MAGNESIUM SERPL-MCNC: 2.5 MG/DL — HIGH (ref 1.8–2.4)
MCHC RBC-ENTMCNC: 25.9 PG — LOW (ref 27–31)
MCHC RBC-ENTMCNC: 26.2 PG — LOW (ref 27–31)
MCHC RBC-ENTMCNC: 32 G/DL — SIGNIFICANT CHANGE UP (ref 32–37)
MCHC RBC-ENTMCNC: 32.3 G/DL — SIGNIFICANT CHANGE UP (ref 32–37)
MCV RBC AUTO: 80.7 FL — SIGNIFICANT CHANGE UP (ref 80–94)
MCV RBC AUTO: 81.2 FL — SIGNIFICANT CHANGE UP (ref 80–94)
MONOCYTES # BLD AUTO: 0.51 K/UL — SIGNIFICANT CHANGE UP (ref 0.1–0.6)
MONOCYTES NFR BLD AUTO: 6.9 % — SIGNIFICANT CHANGE UP (ref 1.7–9.3)
NEUTROPHILS # BLD AUTO: 6.21 K/UL — SIGNIFICANT CHANGE UP (ref 1.4–6.5)
NEUTROPHILS NFR BLD AUTO: 83.5 % — HIGH (ref 42.2–75.2)
NRBC # BLD: 0 /100 WBCS — SIGNIFICANT CHANGE UP (ref 0–0)
NRBC # BLD: 0 /100 WBCS — SIGNIFICANT CHANGE UP (ref 0–0)
PLATELET # BLD AUTO: 126 K/UL — LOW (ref 130–400)
PLATELET # BLD AUTO: 229 K/UL — SIGNIFICANT CHANGE UP (ref 130–400)
POTASSIUM SERPL-MCNC: 3.6 MMOL/L — SIGNIFICANT CHANGE UP (ref 3.5–5)
POTASSIUM SERPL-SCNC: 3.6 MMOL/L — SIGNIFICANT CHANGE UP (ref 3.5–5)
PROT SERPL-MCNC: 5.6 G/DL — LOW (ref 6–8)
PROTHROM AB SERPL-ACNC: 14 SEC — HIGH (ref 9.95–12.87)
RBC # BLD: 3.48 M/UL — LOW (ref 4.7–6.1)
RBC # BLD: 3.82 M/UL — LOW (ref 4.7–6.1)
RBC # FLD: 14.9 % — HIGH (ref 11.5–14.5)
RBC # FLD: 14.9 % — HIGH (ref 11.5–14.5)
SODIUM SERPL-SCNC: 144 MMOL/L — SIGNIFICANT CHANGE UP (ref 135–146)
WBC # BLD: 7.43 K/UL — SIGNIFICANT CHANGE UP (ref 4.8–10.8)
WBC # BLD: 8.65 K/UL — SIGNIFICANT CHANGE UP (ref 4.8–10.8)
WBC # FLD AUTO: 7.43 K/UL — SIGNIFICANT CHANGE UP (ref 4.8–10.8)
WBC # FLD AUTO: 8.65 K/UL — SIGNIFICANT CHANGE UP (ref 4.8–10.8)

## 2022-08-01 PROCEDURE — 33534 CABG ARTERIAL TWO: CPT

## 2022-08-01 PROCEDURE — 93010 ELECTROCARDIOGRAM REPORT: CPT

## 2022-08-01 PROCEDURE — 33534 CABG ARTERIAL TWO: CPT | Mod: AS

## 2022-08-01 PROCEDURE — 33518 CABG ARTERY-VEIN TWO: CPT | Mod: AS

## 2022-08-01 PROCEDURE — 33518 CABG ARTERY-VEIN TWO: CPT

## 2022-08-01 PROCEDURE — 71045 X-RAY EXAM CHEST 1 VIEW: CPT | Mod: 26

## 2022-08-01 PROCEDURE — 35600 OPEN HRV UXTR ART 1 SGM CAB: CPT | Mod: AS,LT

## 2022-08-01 PROCEDURE — 35600 OPEN HRV UXTR ART 1 SGM CAB: CPT | Mod: 59

## 2022-08-01 RX ORDER — ALBUMIN HUMAN 25 %
1000 VIAL (ML) INTRAVENOUS ONCE
Refills: 0 | Status: COMPLETED | OUTPATIENT
Start: 2022-08-01 | End: 2022-08-01

## 2022-08-01 RX ORDER — CHLORHEXIDINE GLUCONATE 213 G/1000ML
5 SOLUTION TOPICAL
Refills: 0 | Status: DISCONTINUED | OUTPATIENT
Start: 2022-08-01 | End: 2022-08-02

## 2022-08-01 RX ORDER — ASPIRIN/CALCIUM CARB/MAGNESIUM 324 MG
300 TABLET ORAL ONCE
Refills: 0 | Status: COMPLETED | OUTPATIENT
Start: 2022-08-01 | End: 2022-08-01

## 2022-08-01 RX ORDER — FENTANYL CITRATE 50 UG/ML
25 INJECTION INTRAVENOUS
Refills: 0 | Status: DISCONTINUED | OUTPATIENT
Start: 2022-08-01 | End: 2022-08-01

## 2022-08-01 RX ORDER — POLYETHYLENE GLYCOL 3350 17 G/17G
17 POWDER, FOR SOLUTION ORAL DAILY
Refills: 0 | Status: DISCONTINUED | OUTPATIENT
Start: 2022-08-01 | End: 2022-08-06

## 2022-08-01 RX ORDER — INSULIN HUMAN 100 [IU]/ML
1 INJECTION, SOLUTION SUBCUTANEOUS
Qty: 100 | Refills: 0 | Status: DISCONTINUED | OUTPATIENT
Start: 2022-08-01 | End: 2022-08-04

## 2022-08-01 RX ORDER — KETOROLAC TROMETHAMINE 30 MG/ML
15 SYRINGE (ML) INJECTION EVERY 4 HOURS
Refills: 0 | Status: DISCONTINUED | OUTPATIENT
Start: 2022-08-01 | End: 2022-08-02

## 2022-08-01 RX ORDER — MEPERIDINE HYDROCHLORIDE 50 MG/ML
25 INJECTION INTRAMUSCULAR; INTRAVENOUS; SUBCUTANEOUS ONCE
Refills: 0 | Status: DISCONTINUED | OUTPATIENT
Start: 2022-08-01 | End: 2022-08-04

## 2022-08-01 RX ORDER — FENTANYL CITRATE 50 UG/ML
25 INJECTION INTRAVENOUS ONCE
Refills: 0 | Status: DISCONTINUED | OUTPATIENT
Start: 2022-08-01 | End: 2022-08-01

## 2022-08-01 RX ORDER — ACETAMINOPHEN 500 MG
1000 TABLET ORAL ONCE
Refills: 0 | Status: COMPLETED | OUTPATIENT
Start: 2022-08-01 | End: 2022-08-01

## 2022-08-01 RX ORDER — CEFAZOLIN SODIUM 1 G
1000 VIAL (EA) INJECTION EVERY 8 HOURS
Refills: 0 | Status: COMPLETED | OUTPATIENT
Start: 2022-08-01 | End: 2022-08-02

## 2022-08-01 RX ORDER — ACETAMINOPHEN 500 MG
1000 TABLET ORAL ONCE
Refills: 0 | Status: COMPLETED | OUTPATIENT
Start: 2022-08-02 | End: 2022-08-02

## 2022-08-01 RX ORDER — SENNA PLUS 8.6 MG/1
1 TABLET ORAL EVERY 12 HOURS
Refills: 0 | Status: DISCONTINUED | OUTPATIENT
Start: 2022-08-01 | End: 2022-08-06

## 2022-08-01 RX ORDER — ASPIRIN/CALCIUM CARB/MAGNESIUM 324 MG
325 TABLET ORAL DAILY
Refills: 0 | Status: DISCONTINUED | OUTPATIENT
Start: 2022-08-01 | End: 2022-08-06

## 2022-08-01 RX ORDER — ACETAMINOPHEN 500 MG
1000 TABLET ORAL ONCE
Refills: 0 | Status: DISCONTINUED | OUTPATIENT
Start: 2022-08-02 | End: 2022-08-02

## 2022-08-01 RX ORDER — ALBUTEROL 90 UG/1
2 AEROSOL, METERED ORAL EVERY 6 HOURS
Refills: 0 | Status: DISCONTINUED | OUTPATIENT
Start: 2022-08-01 | End: 2022-08-02

## 2022-08-01 RX ORDER — POTASSIUM CHLORIDE 20 MEQ
20 PACKET (EA) ORAL ONCE
Refills: 0 | Status: COMPLETED | OUTPATIENT
Start: 2022-08-01 | End: 2022-08-01

## 2022-08-01 RX ORDER — PROPOFOL 10 MG/ML
25 INJECTION, EMULSION INTRAVENOUS
Qty: 1000 | Refills: 0 | Status: DISCONTINUED | OUTPATIENT
Start: 2022-08-01 | End: 2022-08-02

## 2022-08-01 RX ORDER — DEXMEDETOMIDINE HYDROCHLORIDE IN 0.9% SODIUM CHLORIDE 4 UG/ML
0.5 INJECTION INTRAVENOUS
Qty: 200 | Refills: 0 | Status: DISCONTINUED | OUTPATIENT
Start: 2022-08-01 | End: 2022-08-02

## 2022-08-01 RX ORDER — FENTANYL CITRATE 50 UG/ML
25 INJECTION INTRAVENOUS
Refills: 0 | Status: DISCONTINUED | OUTPATIENT
Start: 2022-08-01 | End: 2022-08-02

## 2022-08-01 RX ORDER — DEXTROSE 50 % IN WATER 50 %
25 SYRINGE (ML) INTRAVENOUS
Refills: 0 | Status: DISCONTINUED | OUTPATIENT
Start: 2022-08-01 | End: 2022-08-04

## 2022-08-01 RX ORDER — OXYCODONE HYDROCHLORIDE 5 MG/1
10 TABLET ORAL EVERY 4 HOURS
Refills: 0 | Status: DISCONTINUED | OUTPATIENT
Start: 2022-08-01 | End: 2022-08-06

## 2022-08-01 RX ORDER — NOREPINEPHRINE BITARTRATE/D5W 8 MG/250ML
0.05 PLASTIC BAG, INJECTION (ML) INTRAVENOUS
Qty: 8 | Refills: 0 | Status: DISCONTINUED | OUTPATIENT
Start: 2022-08-01 | End: 2022-08-02

## 2022-08-01 RX ORDER — DEXTROSE 50 % IN WATER 50 %
50 SYRINGE (ML) INTRAVENOUS
Refills: 0 | Status: DISCONTINUED | OUTPATIENT
Start: 2022-08-01 | End: 2022-08-04

## 2022-08-01 RX ORDER — IPRATROPIUM BROMIDE 0.2 MG/ML
2 SOLUTION, NON-ORAL INHALATION EVERY 6 HOURS
Refills: 0 | Status: DISCONTINUED | OUTPATIENT
Start: 2022-08-01 | End: 2022-08-02

## 2022-08-01 RX ORDER — FAMOTIDINE 10 MG/ML
20 INJECTION INTRAVENOUS EVERY 12 HOURS
Refills: 0 | Status: DISCONTINUED | OUTPATIENT
Start: 2022-08-01 | End: 2022-08-02

## 2022-08-01 RX ORDER — SODIUM CHLORIDE 9 MG/ML
1000 INJECTION INTRAMUSCULAR; INTRAVENOUS; SUBCUTANEOUS
Refills: 0 | Status: DISCONTINUED | OUTPATIENT
Start: 2022-08-01 | End: 2022-08-04

## 2022-08-01 RX ORDER — NITROGLYCERIN 6.5 MG
15 CAPSULE, EXTENDED RELEASE ORAL
Qty: 50 | Refills: 0 | Status: DISCONTINUED | OUTPATIENT
Start: 2022-08-01 | End: 2022-08-04

## 2022-08-01 RX ORDER — MAGNESIUM SULFATE 500 MG/ML
1 VIAL (ML) INJECTION EVERY 12 HOURS
Refills: 0 | Status: DISCONTINUED | OUTPATIENT
Start: 2022-08-01 | End: 2022-08-06

## 2022-08-01 RX ORDER — OXYCODONE HYDROCHLORIDE 5 MG/1
5 TABLET ORAL EVERY 4 HOURS
Refills: 0 | Status: DISCONTINUED | OUTPATIENT
Start: 2022-08-01 | End: 2022-08-06

## 2022-08-01 RX ORDER — KETOROLAC TROMETHAMINE 30 MG/ML
30 SYRINGE (ML) INJECTION ONCE
Refills: 0 | Status: DISCONTINUED | OUTPATIENT
Start: 2022-08-01 | End: 2022-08-01

## 2022-08-01 RX ORDER — CHLORHEXIDINE GLUCONATE 213 G/1000ML
1 SOLUTION TOPICAL
Refills: 0 | Status: DISCONTINUED | OUTPATIENT
Start: 2022-08-01 | End: 2022-08-03

## 2022-08-01 RX ORDER — ACETAMINOPHEN 500 MG
1000 TABLET ORAL ONCE
Refills: 0 | Status: COMPLETED | OUTPATIENT
Start: 2022-08-02 | End: 2022-08-01

## 2022-08-01 RX ADMIN — FENTANYL CITRATE 25 MICROGRAM(S): 50 INJECTION INTRAVENOUS at 21:15

## 2022-08-01 RX ADMIN — ALBUTEROL 2 PUFF(S): 90 AEROSOL, METERED ORAL at 14:20

## 2022-08-01 RX ADMIN — PROPOFOL 12.3 MICROGRAM(S)/KG/MIN: 10 INJECTION, EMULSION INTRAVENOUS at 14:48

## 2022-08-01 RX ADMIN — FENTANYL CITRATE 25 MICROGRAM(S): 50 INJECTION INTRAVENOUS at 16:30

## 2022-08-01 RX ADMIN — OXYCODONE HYDROCHLORIDE 10 MILLIGRAM(S): 5 TABLET ORAL at 22:57

## 2022-08-01 RX ADMIN — FAMOTIDINE 20 MILLIGRAM(S): 10 INJECTION INTRAVENOUS at 17:36

## 2022-08-01 RX ADMIN — Medication 15 MILLIGRAM(S): at 20:00

## 2022-08-01 RX ADMIN — OXYCODONE HYDROCHLORIDE 5 MILLIGRAM(S): 5 TABLET ORAL at 17:48

## 2022-08-01 RX ADMIN — FENTANYL CITRATE 25 MICROGRAM(S): 50 INJECTION INTRAVENOUS at 17:07

## 2022-08-01 RX ADMIN — Medication 100 MILLIGRAM(S): at 14:43

## 2022-08-01 RX ADMIN — FENTANYL CITRATE 25 MICROGRAM(S): 50 INJECTION INTRAVENOUS at 19:10

## 2022-08-01 RX ADMIN — Medication 100 MILLIEQUIVALENT(S): at 15:00

## 2022-08-01 RX ADMIN — OXYCODONE HYDROCHLORIDE 10 MILLIGRAM(S): 5 TABLET ORAL at 23:30

## 2022-08-01 RX ADMIN — Medication 30 MILLIGRAM(S): at 16:00

## 2022-08-01 RX ADMIN — FENTANYL CITRATE 25 MICROGRAM(S): 50 INJECTION INTRAVENOUS at 22:10

## 2022-08-01 RX ADMIN — Medication 4.5 MICROGRAM(S)/MIN: at 14:47

## 2022-08-01 RX ADMIN — Medication 2 PUFF(S): at 14:37

## 2022-08-01 RX ADMIN — Medication 15 MILLIGRAM(S): at 20:15

## 2022-08-01 RX ADMIN — OXYCODONE HYDROCHLORIDE 5 MILLIGRAM(S): 5 TABLET ORAL at 18:00

## 2022-08-01 RX ADMIN — CHLORHEXIDINE GLUCONATE 5 MILLILITER(S): 213 SOLUTION TOPICAL at 17:36

## 2022-08-01 RX ADMIN — Medication 100 MILLIEQUIVALENT(S): at 15:49

## 2022-08-01 RX ADMIN — Medication 400 MILLIGRAM(S): at 23:45

## 2022-08-01 RX ADMIN — FENTANYL CITRATE 25 MICROGRAM(S): 50 INJECTION INTRAVENOUS at 19:25

## 2022-08-01 RX ADMIN — FENTANYL CITRATE 25 MICROGRAM(S): 50 INJECTION INTRAVENOUS at 21:00

## 2022-08-01 RX ADMIN — FENTANYL CITRATE 25 MICROGRAM(S): 50 INJECTION INTRAVENOUS at 21:55

## 2022-08-01 RX ADMIN — FENTANYL CITRATE 25 MICROGRAM(S): 50 INJECTION INTRAVENOUS at 17:00

## 2022-08-01 RX ADMIN — Medication 100 MILLIGRAM(S): at 21:02

## 2022-08-01 RX ADMIN — Medication 100 GRAM(S): at 17:32

## 2022-08-01 RX ADMIN — Medication 500 MILLILITER(S): at 16:41

## 2022-08-01 NOTE — DISCHARGE NOTE PROVIDER - NSDCCPTREATMENT_GEN_ALL_CORE_FT
PRINCIPAL PROCEDURE  Procedure: CABG, 2 arterial and 2 venous  Findings and Treatment: LIMA --> LAD  AORTA --> Radial Graft --> Diag  AORTA --> GSVG --> OM  AORTA --> GSVG --> RPDA

## 2022-08-01 NOTE — BRIEF OPERATIVE NOTE - NSICDXBRIEFPROCEDURE_GEN_ALL_CORE_FT
PROCEDURES:  CABG, 2 arterial and 2 venous 01-Aug-2022 12:21:40 LIMA --> LAD  AORTA --> Radial Graft --> Diag  AORTA --> GSVG --> OM  AORTA --> GSVG --> Josiah Terry

## 2022-08-01 NOTE — BRIEF OPERATIVE NOTE - COMMENTS
Pepe Whitt participated as the first assistant during the distal and proximal anastomosis in the absence of a qualified MD or fellow.

## 2022-08-01 NOTE — PRE-OP CHECKLIST - SELECT TESTS ORDERED
BMP/CBC/CMP/PT/PTT/INR/Hepatic Function/Spirometry/Type and Screen/Urinalysis/EKG/CXR/POCT Blood Glucose/COVID-19 169/BMP/CBC/CMP/PT/PTT/INR/Hepatic Function/Spirometry/Type and Screen/Urinalysis/EKG/CXR/POCT Blood Glucose/COVID-19

## 2022-08-01 NOTE — DISCHARGE NOTE PROVIDER - NSDCFUSCHEDAPPT_GEN_ALL_CORE_FT
Deep Watson  [facility]  UROLOGY [address] [place] [address]  Scheduled Appointment: 09/26/2022

## 2022-08-01 NOTE — DISCHARGE NOTE PROVIDER - NSDCCPCAREPLAN_GEN_ALL_CORE_FT
PRINCIPAL DISCHARGE DIAGNOSIS  Diagnosis: 3-vessel CAD  Assessment and Plan of Treatment:        PRINCIPAL DISCHARGE DIAGNOSIS  Diagnosis: 3-vessel CAD  Assessment and Plan of Treatment:       SECONDARY DISCHARGE DIAGNOSES  Diagnosis: Delirium due to multiple etiologies  Assessment and Plan of Treatment:

## 2022-08-01 NOTE — DISCHARGE NOTE PROVIDER - NSDCHHNEEDSERVICE_GEN_ALL_CORE
Other, specify... Medication teaching and assessment/Rehabilitation services/Teaching and training/Other, specify...

## 2022-08-01 NOTE — DISCHARGE NOTE PROVIDER - NSDCMRMEDTOKEN_GEN_ALL_CORE_FT
Aspirin Low Dose 81 mg oral tablet, chewable: 1 tab(s) orally once a day  enalapril-hydrochlorothiazide 10 mg-25 mg oral tablet: 1 tab(s) orally once a day  gemfibrozil 600 mg oral tablet: 1 tab(s) orally 2 times a day  Janumet 50 mg-1000 mg oral tablet: 1 tab(s) orally 2 times a day  Metoprolol Succinate ER 25 mg oral tablet, extended release: 1 tab(s) orally once a day  simvastatin 40 mg oral tablet: 1 tab(s) orally once a day (at bedtime)   aspirin 325 mg oral delayed release tablet: 1 tab(s) orally once a day  enalapril-hydrochlorothiazide 10 mg-25 mg oral tablet: 1 tab(s) orally once a day  gemfibrozil 600 mg oral tablet: 1 tab(s) orally 2 times a day  ibuprofen 400 mg oral tablet: 1 tab(s) orally every 8 hours  Janumet 50 mg-1000 mg oral tablet: 1 tab(s) orally 2 times a day  metFORMIN 1000 mg oral tablet: 1 tab(s) orally every 12 hours  Metoprolol Succinate ER 25 mg oral tablet, extended release: 1 tab(s) orally once a day  metoprolol tartrate 50 mg oral tablet: 1 tab(s) orally every 12 hours  SITagliptin 50 mg oral tablet: 1 tab(s) orally once a day   aspirin 325 mg oral delayed release tablet: 1 tab(s) orally once a day  dilTIAZem 120 mg oral tablet: 1 tab(s) orally once a day  famotidine 20 mg oral tablet: 1 tab(s) orally 2 times a day  gemfibrozil 600 mg oral tablet: 1 tab(s) orally 2 times a day  Janumet 50 mg-1000 mg oral tablet: 1 tab(s) orally 2 times a day  metoprolol tartrate 50 mg oral tablet: 1 tab(s) orally every 12 hours  oxycodone-acetaminophen 5 mg-325 mg oral tablet: 1 tab(s) orally every 6 hours MDD:6  simvastatin 40 mg oral tablet: 1 tab(s) orally once a day (at bedtime)

## 2022-08-01 NOTE — DISCHARGE NOTE PROVIDER - PROVIDER TOKENS
PROVIDER:[TOKEN:[[id number]],SCHEDULEDAPPT:[08/11/2022]],PROVIDER:[TOKEN:[[id number]],FOLLOWUP:[1 month]],PROVIDER:[TOKEN:[[id number]],FOLLOWUP:[2 weeks]]

## 2022-08-01 NOTE — DISCHARGE NOTE PROVIDER - DISCHARGE DIET
Other Diet Instructions DASH Diet/Low Sodium Diet/Consistent Carbohydrate Diabetic Diets/Other Diet Instructions

## 2022-08-01 NOTE — DISCHARGE NOTE PROVIDER - HOSPITAL COURSE
52M with a past medical history of bladder cancer, DM, ED, HTN, HLD, Covid 1/21, ex-smoker, + FH MI and a past surgical history of bladder tumor resection and umbilical hernia repair reports to [facility] with SOB and CP with exertion.  He is unable to walk few blocks due to chest pain and sometimes has L arm pain.  The patient had submaximal stress test and LHC was recommended for exertional chest pain increasing in frequency.  Subsequent cardiac catheterization demonstrated left main disease with multi-vessel involvement.  On 8/1/2022 the patient underwent CABG x 4 utilizing LIMA; radial artery and greater saphenous vein graft conduits.  Post-operatively ..... 52M with a past medical history of bladder cancer, DM, ED, HTN, HLD, Covid 1/21, ex-smoker, + FH MI and a past surgical history of bladder tumor resection and umbilical hernia repair reports to [facility] with SOB and CP with exertion.  He is unable to walk few blocks due to chest pain and sometimes has L arm pain.  The patient had submaximal stress test and LHC was recommended for exertional chest pain increasing in frequency.  Subsequent cardiac catheterization demonstrated left main disease with multi-vessel involvement.  On 8/1/2022 the patient underwent CABG x 4 utilizing LIMA; radial artery and greater saphenous vein graft conduits. Post-operatively, patient admitted to paranoid thoughts, but was cleared by Psychiatry. On the day of discharge, patient pain was well controlled, tolerating a regular diet, voiding freely and ambulating with assistance. patient was given discharge instructions with appropriate follow-up

## 2022-08-01 NOTE — DISCHARGE NOTE PROVIDER - CARE PROVIDERS DIRECT ADDRESSES
[email or web address],DirectAddress_Unknown,DirectAddress_Unknown

## 2022-08-01 NOTE — DISCHARGE NOTE PROVIDER - CARE PROVIDER_API CALL
Shaq Galo)  Thoracic and Cardiac Surgery  [address] [place] [address], [address]  [place] [address]  Phone: [phone]  Fax: [phone]  Scheduled Appointment: 08/11/2022    Malcom Senior)  Family Medicine  [address] [place] [address]  Phone: [phone]  Fax: [phone]  Follow Up Time: 1 month    Diogo HAMMER)  Cardiology; Internal Medicine  [address] [place] [address]  Phone: [phone]  Fax: [phone]  Follow Up Time: 2 weeks

## 2022-08-01 NOTE — DISCHARGE NOTE PROVIDER - NSDCFUADDAPPT_GEN_ALL_CORE_FT
please follow up with dr gilbert of ct surgery on 8/11 at 1:30pm    please call dr eduardo of cardiology to schedule follow up appointment in 2 weeks and please call pmd to schedule follow up appointment in 4 weeks

## 2022-08-01 NOTE — DISCHARGE NOTE PROVIDER - NSDCACTIVITY_GEN_ALL_CORE
Follow Instructions Provided by your Surgical Team Do not drive or operate machinery/Showering allowed/Stairs allowed/Walking - Indoors allowed/No heavy lifting/straining/Walking - Outdoors allowed/Follow Instructions Provided by your Surgical Team

## 2022-08-01 NOTE — PACU DISCHARGE NOTE - COMMENTS
51 y/o M s/p CABG under GETA, currently on nitroglycerin, nicardipine and precredex gtt. No anesthesia related complications. Continue management as per CTU recommendations.     HR: 81  BP: 130/61  SpO2: 100%   T: 36.2   PA: 30/18   CVP19   RR: 14   CO: 8.1   CI: 4.6

## 2022-08-02 LAB
ALBUMIN SERPL ELPH-MCNC: 4.5 G/DL — SIGNIFICANT CHANGE UP (ref 3.5–5.2)
ALBUMIN SERPL ELPH-MCNC: 4.6 G/DL — SIGNIFICANT CHANGE UP (ref 3.5–5.2)
ALP SERPL-CCNC: 26 U/L — LOW (ref 30–115)
ALP SERPL-CCNC: 35 U/L — SIGNIFICANT CHANGE UP (ref 30–115)
ALT FLD-CCNC: 57 U/L — HIGH (ref 0–41)
ALT FLD-CCNC: 61 U/L — HIGH (ref 0–41)
ANION GAP SERPL CALC-SCNC: 11 MMOL/L — SIGNIFICANT CHANGE UP (ref 7–14)
ANION GAP SERPL CALC-SCNC: 11 MMOL/L — SIGNIFICANT CHANGE UP (ref 7–14)
APTT BLD: 28.4 SEC — SIGNIFICANT CHANGE UP (ref 27–39.2)
AST SERPL-CCNC: 58 U/L — HIGH (ref 0–41)
AST SERPL-CCNC: 89 U/L — HIGH (ref 0–41)
BASE EXCESS BLDMV CALC-SCNC: -0.7 MMOL/L — SIGNIFICANT CHANGE UP
BASOPHILS # BLD AUTO: 0 K/UL — SIGNIFICANT CHANGE UP (ref 0–0.2)
BASOPHILS NFR BLD AUTO: 0 % — SIGNIFICANT CHANGE UP (ref 0–1)
BILIRUB SERPL-MCNC: 0.4 MG/DL — SIGNIFICANT CHANGE UP (ref 0.2–1.2)
BILIRUB SERPL-MCNC: 0.5 MG/DL — SIGNIFICANT CHANGE UP (ref 0.2–1.2)
BUN SERPL-MCNC: 7 MG/DL — LOW (ref 10–20)
BUN SERPL-MCNC: 9 MG/DL — LOW (ref 10–20)
CALCIUM SERPL-MCNC: 8 MG/DL — LOW (ref 8.5–10.1)
CALCIUM SERPL-MCNC: 8.2 MG/DL — LOW (ref 8.5–10.1)
CHLORIDE SERPL-SCNC: 105 MMOL/L — SIGNIFICANT CHANGE UP (ref 98–110)
CHLORIDE SERPL-SCNC: 108 MMOL/L — SIGNIFICANT CHANGE UP (ref 98–110)
CO2 SERPL-SCNC: 21 MMOL/L — SIGNIFICANT CHANGE UP (ref 17–32)
CO2 SERPL-SCNC: 24 MMOL/L — SIGNIFICANT CHANGE UP (ref 17–32)
CREAT SERPL-MCNC: 0.5 MG/DL — LOW (ref 0.7–1.5)
CREAT SERPL-MCNC: 0.6 MG/DL — LOW (ref 0.7–1.5)
EGFR: 116 ML/MIN/1.73M2 — SIGNIFICANT CHANGE UP
EGFR: 123 ML/MIN/1.73M2 — SIGNIFICANT CHANGE UP
EOSINOPHIL # BLD AUTO: 0 K/UL — SIGNIFICANT CHANGE UP (ref 0–0.7)
EOSINOPHIL NFR BLD AUTO: 0 % — SIGNIFICANT CHANGE UP (ref 0–8)
GAS PNL BLDA: SIGNIFICANT CHANGE UP
GAS PNL BLDMV: SIGNIFICANT CHANGE UP
GLUCOSE BLDC GLUCOMTR-MCNC: 100 MG/DL — HIGH (ref 70–99)
GLUCOSE BLDC GLUCOMTR-MCNC: 110 MG/DL — HIGH (ref 70–99)
GLUCOSE BLDC GLUCOMTR-MCNC: 113 MG/DL — HIGH (ref 70–99)
GLUCOSE BLDC GLUCOMTR-MCNC: 126 MG/DL — HIGH (ref 70–99)
GLUCOSE BLDC GLUCOMTR-MCNC: 132 MG/DL — HIGH (ref 70–99)
GLUCOSE BLDC GLUCOMTR-MCNC: 139 MG/DL — HIGH (ref 70–99)
GLUCOSE BLDC GLUCOMTR-MCNC: 145 MG/DL — HIGH (ref 70–99)
GLUCOSE BLDC GLUCOMTR-MCNC: 152 MG/DL — HIGH (ref 70–99)
GLUCOSE BLDC GLUCOMTR-MCNC: 154 MG/DL — HIGH (ref 70–99)
GLUCOSE BLDC GLUCOMTR-MCNC: 159 MG/DL — HIGH (ref 70–99)
GLUCOSE BLDC GLUCOMTR-MCNC: 172 MG/DL — HIGH (ref 70–99)
GLUCOSE BLDC GLUCOMTR-MCNC: 215 MG/DL — HIGH (ref 70–99)
GLUCOSE BLDC GLUCOMTR-MCNC: 238 MG/DL — HIGH (ref 70–99)
GLUCOSE BLDC GLUCOMTR-MCNC: 84 MG/DL — SIGNIFICANT CHANGE UP (ref 70–99)
GLUCOSE BLDC GLUCOMTR-MCNC: 88 MG/DL — SIGNIFICANT CHANGE UP (ref 70–99)
GLUCOSE BLDC GLUCOMTR-MCNC: 97 MG/DL — SIGNIFICANT CHANGE UP (ref 70–99)
GLUCOSE BLDC GLUCOMTR-MCNC: 97 MG/DL — SIGNIFICANT CHANGE UP (ref 70–99)
GLUCOSE SERPL-MCNC: 241 MG/DL — HIGH (ref 70–99)
GLUCOSE SERPL-MCNC: 90 MG/DL — SIGNIFICANT CHANGE UP (ref 70–99)
HCO3 BLDMV-SCNC: 24 MMOL/L — SIGNIFICANT CHANGE UP
HCT VFR BLD CALC: 25.1 % — LOW (ref 42–52)
HCT VFR BLD CALC: 28.6 % — LOW (ref 42–52)
HGB BLD-MCNC: 8.2 G/DL — LOW (ref 14–18)
HGB BLD-MCNC: 9.2 G/DL — LOW (ref 14–18)
IMM GRANULOCYTES NFR BLD AUTO: 0.6 % — HIGH (ref 0.1–0.3)
INR BLD: 1.22 RATIO — SIGNIFICANT CHANGE UP (ref 0.65–1.3)
LYMPHOCYTES # BLD AUTO: 0.79 K/UL — LOW (ref 1.2–3.4)
LYMPHOCYTES # BLD AUTO: 15 % — LOW (ref 20.5–51.1)
MAGNESIUM SERPL-MCNC: 2.3 MG/DL — SIGNIFICANT CHANGE UP (ref 1.8–2.4)
MCHC RBC-ENTMCNC: 25.8 PG — LOW (ref 27–31)
MCHC RBC-ENTMCNC: 26.3 PG — LOW (ref 27–31)
MCHC RBC-ENTMCNC: 32.2 G/DL — SIGNIFICANT CHANGE UP (ref 32–37)
MCHC RBC-ENTMCNC: 32.7 G/DL — SIGNIFICANT CHANGE UP (ref 32–37)
MCV RBC AUTO: 80.1 FL — SIGNIFICANT CHANGE UP (ref 80–94)
MCV RBC AUTO: 80.4 FL — SIGNIFICANT CHANGE UP (ref 80–94)
MONOCYTES # BLD AUTO: 0.55 K/UL — SIGNIFICANT CHANGE UP (ref 0.1–0.6)
MONOCYTES NFR BLD AUTO: 10.4 % — HIGH (ref 1.7–9.3)
NEUTROPHILS # BLD AUTO: 3.9 K/UL — SIGNIFICANT CHANGE UP (ref 1.4–6.5)
NEUTROPHILS NFR BLD AUTO: 74 % — SIGNIFICANT CHANGE UP (ref 42.2–75.2)
NRBC # BLD: 0 /100 WBCS — SIGNIFICANT CHANGE UP (ref 0–0)
NRBC # BLD: 0 /100 WBCS — SIGNIFICANT CHANGE UP (ref 0–0)
O2 CT VFR BLD CALC: 37 MMHG — SIGNIFICANT CHANGE UP
PCO2 BLDMV: 40 MMHG — SIGNIFICANT CHANGE UP
PH BLDMV: 7.39 — SIGNIFICANT CHANGE UP
PLATELET # BLD AUTO: 153 K/UL — SIGNIFICANT CHANGE UP (ref 130–400)
PLATELET # BLD AUTO: 162 K/UL — SIGNIFICANT CHANGE UP (ref 130–400)
POTASSIUM SERPL-MCNC: 3.8 MMOL/L — SIGNIFICANT CHANGE UP (ref 3.5–5)
POTASSIUM SERPL-MCNC: 4 MMOL/L — SIGNIFICANT CHANGE UP (ref 3.5–5)
POTASSIUM SERPL-SCNC: 3.8 MMOL/L — SIGNIFICANT CHANGE UP (ref 3.5–5)
POTASSIUM SERPL-SCNC: 4 MMOL/L — SIGNIFICANT CHANGE UP (ref 3.5–5)
PROT SERPL-MCNC: 5.8 G/DL — LOW (ref 6–8)
PROT SERPL-MCNC: 5.9 G/DL — LOW (ref 6–8)
PROTHROM AB SERPL-ACNC: 14 SEC — HIGH (ref 9.95–12.87)
RBC # BLD: 3.12 M/UL — LOW (ref 4.7–6.1)
RBC # BLD: 3.57 M/UL — LOW (ref 4.7–6.1)
RBC # FLD: 15.3 % — HIGH (ref 11.5–14.5)
RBC # FLD: 15.4 % — HIGH (ref 11.5–14.5)
SAO2 % BLDMV: 63.8 % — SIGNIFICANT CHANGE UP
SODIUM SERPL-SCNC: 140 MMOL/L — SIGNIFICANT CHANGE UP (ref 135–146)
SODIUM SERPL-SCNC: 140 MMOL/L — SIGNIFICANT CHANGE UP (ref 135–146)
WBC # BLD: 4.94 K/UL — SIGNIFICANT CHANGE UP (ref 4.8–10.8)
WBC # BLD: 5.27 K/UL — SIGNIFICANT CHANGE UP (ref 4.8–10.8)
WBC # FLD AUTO: 4.94 K/UL — SIGNIFICANT CHANGE UP (ref 4.8–10.8)
WBC # FLD AUTO: 5.27 K/UL — SIGNIFICANT CHANGE UP (ref 4.8–10.8)

## 2022-08-02 PROCEDURE — 93010 ELECTROCARDIOGRAM REPORT: CPT

## 2022-08-02 PROCEDURE — 71045 X-RAY EXAM CHEST 1 VIEW: CPT | Mod: 26

## 2022-08-02 PROCEDURE — 99291 CRITICAL CARE FIRST HOUR: CPT

## 2022-08-02 PROCEDURE — 71045 X-RAY EXAM CHEST 1 VIEW: CPT | Mod: 26,77

## 2022-08-02 RX ORDER — IPRATROPIUM BROMIDE 0.2 MG/ML
500 SOLUTION, NON-ORAL INHALATION EVERY 6 HOURS
Refills: 0 | Status: DISCONTINUED | OUTPATIENT
Start: 2022-08-02 | End: 2022-08-06

## 2022-08-02 RX ORDER — METOPROLOL TARTRATE 50 MG
50 TABLET ORAL EVERY 8 HOURS
Refills: 0 | Status: DISCONTINUED | OUTPATIENT
Start: 2022-08-02 | End: 2022-08-05

## 2022-08-02 RX ORDER — DILTIAZEM HCL 120 MG
30 CAPSULE, EXT RELEASE 24 HR ORAL EVERY 8 HOURS
Refills: 0 | Status: DISCONTINUED | OUTPATIENT
Start: 2022-08-02 | End: 2022-08-03

## 2022-08-02 RX ORDER — AMLODIPINE BESYLATE 2.5 MG/1
2.5 TABLET ORAL DAILY
Refills: 0 | Status: DISCONTINUED | OUTPATIENT
Start: 2022-08-02 | End: 2022-08-02

## 2022-08-02 RX ORDER — HEPARIN SODIUM 5000 [USP'U]/ML
5000 INJECTION INTRAVENOUS; SUBCUTANEOUS EVERY 8 HOURS
Refills: 0 | Status: DISCONTINUED | OUTPATIENT
Start: 2022-08-02 | End: 2022-08-06

## 2022-08-02 RX ORDER — METOPROLOL TARTRATE 50 MG
25 TABLET ORAL ONCE
Refills: 0 | Status: COMPLETED | OUTPATIENT
Start: 2022-08-02 | End: 2022-08-02

## 2022-08-02 RX ORDER — METOPROLOL TARTRATE 50 MG
50 TABLET ORAL
Refills: 0 | Status: DISCONTINUED | OUTPATIENT
Start: 2022-08-02 | End: 2022-08-02

## 2022-08-02 RX ORDER — FAMOTIDINE 10 MG/ML
20 INJECTION INTRAVENOUS
Refills: 0 | Status: DISCONTINUED | OUTPATIENT
Start: 2022-08-02 | End: 2022-08-06

## 2022-08-02 RX ORDER — LISINOPRIL 2.5 MG/1
5 TABLET ORAL DAILY
Refills: 0 | Status: DISCONTINUED | OUTPATIENT
Start: 2022-08-02 | End: 2022-08-02

## 2022-08-02 RX ORDER — METOPROLOL TARTRATE 50 MG
25 TABLET ORAL EVERY 12 HOURS
Refills: 0 | Status: DISCONTINUED | OUTPATIENT
Start: 2022-08-02 | End: 2022-08-02

## 2022-08-02 RX ORDER — METOPROLOL TARTRATE 50 MG
5 TABLET ORAL ONCE
Refills: 0 | Status: COMPLETED | OUTPATIENT
Start: 2022-08-02 | End: 2022-08-02

## 2022-08-02 RX ORDER — LABETALOL HCL 100 MG
10 TABLET ORAL ONCE
Refills: 0 | Status: COMPLETED | OUTPATIENT
Start: 2022-08-02 | End: 2022-08-02

## 2022-08-02 RX ORDER — DILTIAZEM HCL 120 MG
30 CAPSULE, EXT RELEASE 24 HR ORAL EVERY 12 HOURS
Refills: 0 | Status: DISCONTINUED | OUTPATIENT
Start: 2022-08-02 | End: 2022-08-02

## 2022-08-02 RX ORDER — KETOROLAC TROMETHAMINE 30 MG/ML
30 SYRINGE (ML) INJECTION EVERY 8 HOURS
Refills: 0 | Status: DISCONTINUED | OUTPATIENT
Start: 2022-08-02 | End: 2022-08-03

## 2022-08-02 RX ADMIN — CHLORHEXIDINE GLUCONATE 1 APPLICATION(S): 213 SOLUTION TOPICAL at 05:30

## 2022-08-02 RX ADMIN — Medication 30 MILLIGRAM(S): at 14:40

## 2022-08-02 RX ADMIN — Medication 25 MILLIGRAM(S): at 06:30

## 2022-08-02 RX ADMIN — Medication 2 PUFF(S): at 08:05

## 2022-08-02 RX ADMIN — Medication 30 MILLIGRAM(S): at 21:03

## 2022-08-02 RX ADMIN — ALBUTEROL 2 PUFF(S): 90 AEROSOL, METERED ORAL at 08:05

## 2022-08-02 RX ADMIN — Medication 15 MILLIGRAM(S): at 02:27

## 2022-08-02 RX ADMIN — INSULIN HUMAN 1 UNIT(S)/HR: 100 INJECTION, SOLUTION SUBCUTANEOUS at 21:10

## 2022-08-02 RX ADMIN — OXYCODONE HYDROCHLORIDE 10 MILLIGRAM(S): 5 TABLET ORAL at 11:23

## 2022-08-02 RX ADMIN — Medication 50 MILLIGRAM(S): at 18:00

## 2022-08-02 RX ADMIN — HEPARIN SODIUM 5000 UNIT(S): 5000 INJECTION INTRAVENOUS; SUBCUTANEOUS at 13:04

## 2022-08-02 RX ADMIN — Medication 10 MILLIGRAM(S): at 06:30

## 2022-08-02 RX ADMIN — Medication 15 MILLIGRAM(S): at 06:00

## 2022-08-02 RX ADMIN — Medication 30 MILLIGRAM(S): at 21:04

## 2022-08-02 RX ADMIN — FAMOTIDINE 20 MILLIGRAM(S): 10 INJECTION INTRAVENOUS at 18:54

## 2022-08-02 RX ADMIN — Medication 30 MILLIGRAM(S): at 11:05

## 2022-08-02 RX ADMIN — Medication 15 MILLIGRAM(S): at 02:57

## 2022-08-02 RX ADMIN — Medication 5 MILLIGRAM(S): at 06:31

## 2022-08-02 RX ADMIN — Medication 15 MILLIGRAM(S): at 06:27

## 2022-08-02 RX ADMIN — Medication 25 MILLIGRAM(S): at 10:01

## 2022-08-02 RX ADMIN — Medication 1000 MILLIGRAM(S): at 05:30

## 2022-08-02 RX ADMIN — Medication 600 MILLIGRAM(S): at 05:15

## 2022-08-02 RX ADMIN — OXYCODONE HYDROCHLORIDE 5 MILLIGRAM(S): 5 TABLET ORAL at 21:04

## 2022-08-02 RX ADMIN — Medication 100 GRAM(S): at 05:16

## 2022-08-02 RX ADMIN — Medication 500 MICROGRAM(S): at 15:00

## 2022-08-02 RX ADMIN — OXYCODONE HYDROCHLORIDE 10 MILLIGRAM(S): 5 TABLET ORAL at 11:53

## 2022-08-02 RX ADMIN — Medication 25 MILLIGRAM(S): at 18:00

## 2022-08-02 RX ADMIN — SENNA PLUS 1 TABLET(S): 8.6 TABLET ORAL at 18:54

## 2022-08-02 RX ADMIN — HEPARIN SODIUM 5000 UNIT(S): 5000 INJECTION INTRAVENOUS; SUBCUTANEOUS at 21:04

## 2022-08-02 RX ADMIN — Medication 15 MILLIGRAM(S): at 10:02

## 2022-08-02 RX ADMIN — Medication 100 MILLIGRAM(S): at 05:17

## 2022-08-02 RX ADMIN — SENNA PLUS 1 TABLET(S): 8.6 TABLET ORAL at 05:15

## 2022-08-02 RX ADMIN — Medication 400 MILLIGRAM(S): at 05:17

## 2022-08-02 RX ADMIN — Medication 15 MILLIGRAM(S): at 10:32

## 2022-08-02 RX ADMIN — POLYETHYLENE GLYCOL 3350 17 GRAM(S): 17 POWDER, FOR SOLUTION ORAL at 11:06

## 2022-08-02 RX ADMIN — OXYCODONE HYDROCHLORIDE 10 MILLIGRAM(S): 5 TABLET ORAL at 04:38

## 2022-08-02 RX ADMIN — OXYCODONE HYDROCHLORIDE 5 MILLIGRAM(S): 5 TABLET ORAL at 22:00

## 2022-08-02 RX ADMIN — FAMOTIDINE 20 MILLIGRAM(S): 10 INJECTION INTRAVENOUS at 05:15

## 2022-08-02 RX ADMIN — Medication 600 MILLIGRAM(S): at 18:54

## 2022-08-02 RX ADMIN — Medication 1000 MILLIGRAM(S): at 00:00

## 2022-08-02 RX ADMIN — OXYCODONE HYDROCHLORIDE 10 MILLIGRAM(S): 5 TABLET ORAL at 04:08

## 2022-08-02 RX ADMIN — AMLODIPINE BESYLATE 2.5 MILLIGRAM(S): 2.5 TABLET ORAL at 06:31

## 2022-08-02 RX ADMIN — Medication 100 GRAM(S): at 18:54

## 2022-08-02 RX ADMIN — CHLORHEXIDINE GLUCONATE 5 MILLILITER(S): 213 SOLUTION TOPICAL at 05:16

## 2022-08-02 RX ADMIN — Medication 30 MILLIGRAM(S): at 21:30

## 2022-08-02 RX ADMIN — Medication 30 MILLIGRAM(S): at 15:00

## 2022-08-02 RX ADMIN — Medication 50 MILLIGRAM(S): at 21:04

## 2022-08-02 RX ADMIN — Medication 325 MILLIGRAM(S): at 11:05

## 2022-08-02 RX ADMIN — OXYCODONE HYDROCHLORIDE 10 MILLIGRAM(S): 5 TABLET ORAL at 16:27

## 2022-08-02 NOTE — PHYSICAL THERAPY INITIAL EVALUATION ADULT - RANGE OF MOTION EXAMINATION, REHAB EVAL
within precautions/bilateral upper extremity ROM was WFL (within functional limits)/bilateral lower extremity ROM was WFL (within functional limits)

## 2022-08-02 NOTE — PROGRESS NOTE ADULT - SUBJECTIVE AND OBJECTIVE BOX
OPERATIVE PROCEDURE(s):  CABGx4 + left radial              POD #       1                 52yMale  SURGEON(s): LAISHA Lopez  SUBJECTIVE ASSESSMENT:   Vital Signs Last 24 Hrs  T(F): 99.5 (02 Aug 2022 04:00), Max: 99.7 (02 Aug 2022 01:00)  HR: 83 (02 Aug 2022 08:00) (76 - 111)  BP: 129/73 (02 Aug 2022 08:00) (94/53 - 129/73)  BP(mean): 95 (02 Aug 2022 08:00) (66 - 95)  ABP: 154/75 (02 Aug 2022 08:00) (82/42 - 169/60)  ABP(mean): 96 (02 Aug 2022 08:00)  RR: 28 (02 Aug 2022 08:00) (6 - 37)  SpO2: 97% (02 Aug 2022 08:00) (90% - 100%)  CVP(mm Hg): 11 (02 Aug 2022 08:00)  CO: 8.1 (02 Aug 2022 04:00)  CI: 4.1 (02 Aug 2022 04:00)  PA: 25/14 (02 Aug 2022 08:00)  SVR: 759 (02 Aug 2022 04:00)    I&O's Detail    01 Aug 2022 07:01  -  02 Aug 2022 07:00  --------------------------------------------------------  IN:    Albumin 5%  - 500 mL: 1000 mL    Dexmedetomidine: 78 mL    Insulin: 48 mL    IV PiggyBack: 800 mL    Nitroglycerin: 640 mL    Oral Fluid: 240 mL    Propofol: 55 mL    sodium chloride 0.9%: 370 mL  Total IN: 3231 mL    OUT:    Chest Tube (mL): 70 mL    Chest Tube (mL): 400 mL    Indwelling Catheter - Urethral (mL): 1710 mL  Total OUT: 2180 mL        Net:   I&O's Detail    31 Jul 2022 07:01  -  01 Aug 2022 07:00  --------------------------------------------------------  Total NET: -640 mL      01 Aug 2022 07:01  -  02 Aug 2022 07:00  --------------------------------------------------------  Total NET: 1051 mL        CAPILLARY BLOOD GLUCOSE      POCT Blood Glucose.: 132 mg/dL (02 Aug 2022 07:55)  POCT Blood Glucose.: 159 mg/dL (02 Aug 2022 06:57)  POCT Blood Glucose.: 152 mg/dL (02 Aug 2022 06:23)  POCT Blood Glucose.: 110 mg/dL (02 Aug 2022 04:15)  POCT Blood Glucose.: 100 mg/dL (02 Aug 2022 02:04)  POCT Blood Glucose.: 97 mg/dL (02 Aug 2022 00:58)  POCT Blood Glucose.: 113 mg/dL (02 Aug 2022 00:04)  POCT Blood Glucose.: 113 mg/dL (01 Aug 2022 23:01)  POCT Blood Glucose.: 130 mg/dL (01 Aug 2022 21:14)  POCT Blood Glucose.: 128 mg/dL (01 Aug 2022 20:20)  POCT Blood Glucose.: 123 mg/dL (01 Aug 2022 19:25)  POCT Blood Glucose.: 146 mg/dL (01 Aug 2022 17:23)  POCT Blood Glucose.: 162 mg/dL (01 Aug 2022 15:56)  POCT Blood Glucose.: 156 mg/dL (01 Aug 2022 15:24)  POCT Blood Glucose.: 150 mg/dL (01 Aug 2022 14:19)  POCT Blood Glucose.: 131 mg/dL (01 Aug 2022 12:41)    Physical Exam:  General: NAD; A&Ox3/Patient is intubated and sedated  Cardiac: S1/S2, RRR, no murmur, no rubs  Lungs: unlabored respirations, CTA b/l, no wheeze, no rales, no crackles  Abdomen: Soft/NT/ND; positive bowel sounds x 4  Sternum: Intact, no click, incision healing well with no drainage  Incisions: Incisions clean/dry/intact  Extremities: No edema b/l lower extremities; good capillary refill; no cyanosis; palpable 1+ pedal pulses b/l    Central Venous Catheter: Yes[]  No[] , If Yes indication:           Day #  Oreilly Catheter: Yes  [] , No  [] , If yes indication:                      Day #  NGT: Yes [] No [] ,    If Yes Placement:                                     Day #  EPICARDIAL WIRES:  [] YES [] NO                                              Day #  BOWEL MOVEMENT:  [] YES [] NO, If No, Timing since last BM:      Day #  CHEST TUBE(Left/Right):  [] YES [] NO, If yes -  AIR LEAKS:  [] YES [] NO        LABS:                        8.2<L>  5.27  )-----------( 153      ( 02 Aug 2022 02:00 )             25.1<L>                        9.0<L>  7.43  )-----------( 126<L>    ( 01 Aug 2022 13:40 )             28.1<L>    08-02    140  |  108  |  9<L>  ----------------------------<  90  4.0   |  21  |  0.5<L>  08-01    144  |  111<H>  |  9<L>  ----------------------------<  129<H>  3.6   |  23  |  0.6<L>    Ca    8.0<L>      02 Aug 2022 02:00  Mg     2.3     08-02    TPro  5.8<L> [6.0 - 8.0]  /  Alb  4.5 [3.5 - 5.2]  /  TBili  0.4 [0.2 - 1.2]  /  DBili  x   /  AST  89<H> [0 - 41]  /  ALT  61<H> [0 - 41]  /  AlkPhos  26<L> [30 - 115]  08-02    PT/INR - ( 02 Aug 2022 02:00 )   PT: ;   INR: 1.22 ratio         PT/INR - ( 01 Aug 2022 13:40 )   PT: ;   INR: 1.22 ratio         PTT - ( 02 Aug 2022 02:00 )  PTT:28.4 sec, PTT - ( 01 Aug 2022 13:40 )  PTT:32.2 sec    ABG - ( 02 Aug 2022 02:53 )  pH: 7.44  /  pCO2: 36    /  pO2: 115   / HCO3: 24    / Base Excess: 0.5   /  SaO2: 97.7  /  LA: 0.70             RADIOLOGY & ADDITIONAL TESTS:  CXR:  EKG:  MEDICATIONS  (STANDING):  ALBUTerol    90 MICROgram(s) HFA Inhaler 2 Puff(s) Inhalation every 6 hours  amLODIPine   Tablet 2.5 milliGRAM(s) Oral daily  aspirin enteric coated 325 milliGRAM(s) Oral daily  chlorhexidine 0.12% Liquid 5 milliLiter(s) Oral Mucosa two times a day  chlorhexidine 4% Liquid 1 Application(s) Topical <User Schedule>  dexMEDEtomidine Infusion 0.5 MICROgram(s)/kG/Hr (10.3 mL/Hr) IV Continuous <Continuous>  dextrose 50% Injectable 50 milliLiter(s) IV Push every 15 minutes  dextrose 50% Injectable 25 milliLiter(s) IV Push every 15 minutes  famotidine Injectable 20 milliGRAM(s) IV Push every 12 hours  guaiFENesin  milliGRAM(s) Oral every 12 hours  insulin regular Infusion 1 Unit(s)/Hr (1 mL/Hr) IV Continuous <Continuous>  ipratropium 17 MICROgram(s) HFA Inhaler 2 Puff(s) Inhalation every 6 hours  ketorolac   Injectable 15 milliGRAM(s) IV Push every 4 hours  magnesium sulfate  IVPB 1 Gram(s) IV Intermittent every 12 hours  meperidine     Injectable 25 milliGRAM(s) IV Push once  metoprolol tartrate 25 milliGRAM(s) Oral every 12 hours  nitroglycerin  Infusion 15 MICROgram(s)/Min (4.5 mL/Hr) IV Continuous <Continuous>  norepinephrine Infusion 0.05 MICROgram(s)/kG/Min (7.7 mL/Hr) IV Continuous <Continuous>  polyethylene glycol 3350 17 Gram(s) Oral daily  propofol Infusion 25 MICROgram(s)/kG/Min (12.3 mL/Hr) IV Continuous <Continuous>  senna 1 Tablet(s) Oral every 12 hours  sodium chloride 0.9%. 1000 milliLiter(s) (20 mL/Hr) IV Continuous <Continuous>    MEDICATIONS  (PRN):  oxyCODONE    IR 10 milliGRAM(s) Oral every 4 hours PRN Moderate Pain (4 - 6)  oxyCODONE    IR 5 milliGRAM(s) Oral every 4 hours PRN Mild Pain (1 - 3)    HEPARIN:  [] YES [] NO  Dose: XX UNITS/HR UNITS Q8H  LOVENOX:[] YES [] NO  Dose: XX mg Q24H  COUMADIN: []  YES [] NO  Dose: XX mg  Q24H  SCD's: YES b/l  GI Prophylaxis: Protonix [], Pepcid [], None [], (Contra-indication:.....)    Post-Op Beta-Blockers: Yes [], No[], If No, then contraindication:  Post-Op Aspirin: Yes [],  No [], If No, then contraindication:  Post-Op Statin: Yes [], No[], If No, then contraindication:  Allergies    No Known Allergies    Intolerances      Ambulation/Activity Status:    Assessment/Plan:  52y Male status-post .....  - Case and plan discussed with CTU Intensivist and CT Surgeon - Dr. Galo/Jessica/Matthew  - Continue CTU supportive care    - Continue DVT/GI prophylaxis  - Incentive Spirometry 10 times an hour  - Continue to advance physical activity as tolerated and continue PT/OT as directed  1. CAD: Continue ASA, statin, BB  2. HTN:   3. A. Fib:   4. COPD/Hypoxia:   5. DM/Glucose Control:     Social Service Disposition:     OPERATIVE PROCEDURE(s):  CABGx4 + left radial              POD #       1                 52yMale  SURGEON(s): LAISHA Lopez  SUBJECTIVE ASSESSMENT: pt seen and examined. mild incisional pain  Vital Signs Last 24 Hrs  T(F): 99.5 (02 Aug 2022 04:00), Max: 99.7 (02 Aug 2022 01:00)  HR: 83 (02 Aug 2022 08:00) (76 - 111)  BP: 129/73 (02 Aug 2022 08:00) (94/53 - 129/73)  BP(mean): 95 (02 Aug 2022 08:00) (66 - 95)  ABP: 154/75 (02 Aug 2022 08:00) (82/42 - 169/60)  ABP(mean): 96 (02 Aug 2022 08:00)  RR: 28 (02 Aug 2022 08:00) (6 - 37)  SpO2: 97% (02 Aug 2022 08:00) (90% - 100%)  CVP(mm Hg): 11 (02 Aug 2022 08:00)  CO: 8.1 (02 Aug 2022 04:00)  CI: 4.1 (02 Aug 2022 04:00)  PA: 25/14 (02 Aug 2022 08:00)  SVR: 759 (02 Aug 2022 04:00)    I&O's Detail    01 Aug 2022 07:01  -  02 Aug 2022 07:00  --------------------------------------------------------  IN:    Albumin 5%  - 500 mL: 1000 mL    Dexmedetomidine: 78 mL    Insulin: 48 mL    IV PiggyBack: 800 mL    Nitroglycerin: 640 mL    Oral Fluid: 240 mL    Propofol: 55 mL    sodium chloride 0.9%: 370 mL  Total IN: 3231 mL    OUT:    Chest Tube (mL): 70 mL    Chest Tube (mL): 400 mL    Indwelling Catheter - Urethral (mL): 1710 mL  Total OUT: 2180 mL        Net:   I&O's Detail    31 Jul 2022 07:01  -  01 Aug 2022 07:00  --------------------------------------------------------  Total NET: -640 mL      01 Aug 2022 07:01  -  02 Aug 2022 07:00  --------------------------------------------------------  Total NET: 1051 mL        CAPILLARY BLOOD GLUCOSE      POCT Blood Glucose.: 132 mg/dL (02 Aug 2022 07:55)  POCT Blood Glucose.: 159 mg/dL (02 Aug 2022 06:57)  POCT Blood Glucose.: 152 mg/dL (02 Aug 2022 06:23)  POCT Blood Glucose.: 110 mg/dL (02 Aug 2022 04:15)  POCT Blood Glucose.: 100 mg/dL (02 Aug 2022 02:04)  POCT Blood Glucose.: 97 mg/dL (02 Aug 2022 00:58)  POCT Blood Glucose.: 113 mg/dL (02 Aug 2022 00:04)  POCT Blood Glucose.: 113 mg/dL (01 Aug 2022 23:01)  POCT Blood Glucose.: 130 mg/dL (01 Aug 2022 21:14)  POCT Blood Glucose.: 128 mg/dL (01 Aug 2022 20:20)  POCT Blood Glucose.: 123 mg/dL (01 Aug 2022 19:25)  POCT Blood Glucose.: 146 mg/dL (01 Aug 2022 17:23)  POCT Blood Glucose.: 162 mg/dL (01 Aug 2022 15:56)  POCT Blood Glucose.: 156 mg/dL (01 Aug 2022 15:24)  POCT Blood Glucose.: 150 mg/dL (01 Aug 2022 14:19)  POCT Blood Glucose.: 131 mg/dL (01 Aug 2022 12:41)    Physical Exam:  General: NAD; [id number]  Cardiac: S1/S2, RRR, no murmur, no rubs  Lungs: decreased bs bilaterally   Abdomen: Soft/NT/ND; positive bowel sounds x 4  Sternum: Intact, no click, incision healing well with no drainage  Incisions: Incisions clean/dry/intact  Extremities: No edema b/l lower extremities; good capillary refill; no cyanosis; palpable 1+ pedal pulses b/l    Central Venous Catheter: Yes[x]  No[] , If Yes indication:  critical        Day #1  Oreilly Catheter: Yes  [x] , No  [] , If yes indication:    strict i.o                  Day #1  EPICARDIAL WIRES:  [x] YES [] NO                                              Day #1  BOWEL MOVEMENT:  [] YES [x] NO, If No, Timing since last BM:      Day #  CHEST TUBE(Left/Right):  [x] YES [] NO, If yes -  AIR LEAKS:  [] YES [] NO  remove today      LABS:                        8.2<L>  5.27  )-----------( 153      ( 02 Aug 2022 02:00 )             25.1<L>                        9.0<L>  7.43  )-----------( 126<L>    ( 01 Aug 2022 13:40 )             28.1<L>    08-02    140  |  108  |  9<L>  ----------------------------<  90  4.0   |  21  |  0.5<L>  08-01    144  |  111<H>  |  9<L>  ----------------------------<  129<H>  3.6   |  23  |  0.6<L>    Ca    8.0<L>      02 Aug 2022 02:00  Mg     2.3     08-02    TPro  5.8<L> [6.0 - 8.0]  /  Alb  4.5 [3.5 - 5.2]  /  TBili  0.4 [0.2 - 1.2]  /  DBili  x   /  AST  89<H> [0 - 41]  /  ALT  61<H> [0 - 41]  /  AlkPhos  26<L> [30 - 115]  08-02    PT/INR - ( 02 Aug 2022 02:00 )   PT: ;   INR: 1.22 ratio         PT/INR - ( 01 Aug 2022 13:40 )   PT: ;   INR: 1.22 ratio         PTT - ( 02 Aug 2022 02:00 )  PTT:28.4 sec, PTT - ( 01 Aug 2022 13:40 )  PTT:32.2 sec    ABG - ( 02 Aug 2022 02:53 )  pH: 7.44  /  pCO2: 36    /  pO2: 115   / HCO3: 24    / Base Excess: 0.5   /  SaO2: 97.7  /  LA: 0.70             RADIOLOGY & ADDITIONAL TESTS:  CXR: < from: Xray Chest 1 View- PORTABLE-Urgent (Xray Chest 1 View- PORTABLE-Urgent .) (08.02.22 @ 10:50) >  Impression:  Interval removal of left-sided chest tube. No pneumothorax seen. Interval   removal of [place] catheter.    < end of copied text >    EKG: < from: 12 Lead ECG (08.02.22 @ 08:26) >    Ventricular Rate 87 BPM    Atrial Rate 87 BPM    P-R Interval 132 ms    QRS Duration 146 ms    Q-T Interval 418 ms    QTC Calculation(Bazett) 502 ms    P Axis 33 degrees    R Axis -60 degrees    T Axis 37 degrees    Diagnosis Line Normal sinus rhythm  Left axis deviation  Right bundle branch block  Septal infarct , age undetermined  Abnormal ECG    < end of copied text >    MEDICATIONS  (STANDING):  ALBUTerol    90 MICROgram(s) HFA Inhaler 2 Puff(s) Inhalation every 6 hours  amLODIPine   Tablet 2.5 milliGRAM(s) Oral daily  aspirin enteric coated 325 milliGRAM(s) Oral daily  chlorhexidine 0.12% Liquid 5 milliLiter(s) Oral Mucosa two times a day  chlorhexidine 4% Liquid 1 Application(s) Topical <User Schedule>  dexMEDEtomidine Infusion 0.5 MICROgram(s)/kG/Hr (10.3 mL/Hr) IV Continuous <Continuous>  dextrose 50% Injectable 50 milliLiter(s) IV Push every 15 minutes  dextrose 50% Injectable 25 milliLiter(s) IV Push every 15 minutes  famotidine Injectable 20 milliGRAM(s) IV Push every 12 hours  guaiFENesin  milliGRAM(s) Oral every 12 hours  insulin regular Infusion 1 Unit(s)/Hr (1 mL/Hr) IV Continuous <Continuous>  ipratropium 17 MICROgram(s) HFA Inhaler 2 Puff(s) Inhalation every 6 hours  ketorolac   Injectable 15 milliGRAM(s) IV Push every 4 hours  magnesium sulfate  IVPB 1 Gram(s) IV Intermittent every 12 hours  meperidine     Injectable 25 milliGRAM(s) IV Push once  metoprolol tartrate 25 milliGRAM(s) Oral every 12 hours  nitroglycerin  Infusion 15 MICROgram(s)/Min (4.5 mL/Hr) IV Continuous <Continuous>  norepinephrine Infusion 0.05 MICROgram(s)/kG/Min (7.7 mL/Hr) IV Continuous <Continuous>  polyethylene glycol 3350 17 Gram(s) Oral daily  propofol Infusion 25 MICROgram(s)/kG/Min (12.3 mL/Hr) IV Continuous <Continuous>  senna 1 Tablet(s) Oral every 12 hours  sodium chloride 0.9%. 1000 milliLiter(s) (20 mL/Hr) IV Continuous <Continuous>    MEDICATIONS  (PRN):  oxyCODONE    IR 10 milliGRAM(s) Oral every 4 hours PRN Moderate Pain (4 - 6)  oxyCODONE    IR 5 milliGRAM(s) Oral every 4 hours PRN Mild Pain (1 - 3)    HEPARIN:  [x] YES [] NO  Dose: 5000 UNITS Q8H    SCD's: YES b/l  GI Prophylaxis: Protonix [], Pepcid [x], None [], (Contra-indication:.....)    Post-Op Beta-Blockers: Yes [x], No[], If No, then contraindication:  Post-Op Aspirin: Yes [x],  No [], If No, then contraindication:  Post-Op Statin: Yes [], No[x], If No, then contraindication: elevated lfts  Allergies    No Known Allergies    Intolerances      Ambulation/Activity Status: ambulate     Assessment/Plan:  52y Male status-post CABGx4 + left radial POD#1  - Case and plan discussed with CTU Intensivist and CT Surgeon - Dr. Galo/Jessica/Matthew  - Continue CTU supportive care    - Continue DVT/GI prophylaxis  - Incentive Spirometry 10 times an hour  - Continue to advance physical activity as tolerated and continue PT/OT as directed  1. CAD: Continue ASA, BB, hold statins as lfts are elevated, start cardizem to prevent radial vasospasm, pain control, d/c swan, d/c chest tubes   2. HTN: cont lopressor and cardizem  3. A. Fib prophylaxis: cont bb, cardizem, monitor electrolytes, mag 1g bid  4. COPD/Hypoxia: cont nebs, mucinex, wean o2 as tolerated, encourage incentive spirometry  5. DM/Glucose Control: cont insulin gtt for now    Social Service Disposition:  pt to assess, home most likely

## 2022-08-02 NOTE — OCCUPATIONAL THERAPY INITIAL EVALUATION ADULT - PERTINENT HX OF CURRENT PROBLEM, REHAB EVAL
53 y/o M PMH:  bladder cancer, DM, ED, HTN, HLD, Covid 1/21, ex-smoker, + FH MI ,Pt reports SOB and CP with exertion , unable to walk few blocks d/t chest pain, sometimes has L arm pain, pt had submaximal stress test, LHC recommended for exertional chest pain increasing in frequency

## 2022-08-02 NOTE — OCCUPATIONAL THERAPY INITIAL EVALUATION ADULT - BED MOBILITY/TRANSFERS, PREVIOUS LEVEL OF FUNCTION, OT EVAL
Take medications as prescribed  Continue daily allergy medication  Saline nasal spray  Afrin spray if congestion gets worse or can't sleep (do not use for >3 days)  Fluids (warm water with honey and lemon)  Steam treatment  Change pillowcase everyday  Consider Nettipot use  Consider allergist follow up  Follow up with PCP in 3-5 days  Proceed to  ER if symptoms worsen  Allergic Rhinitis   WHAT YOU NEED TO KNOW:   Allergic rhinitis, or hay fever, is swelling of the inside of your nose  The swelling is a reaction to allergens in the air  An allergen can be anything that causes an allergic reaction  Allergies to weeds, grass, trees, or mold often cause seasonal allergic rhinitis  Indoor dust mites, cockroaches, pet dander, or mold can also cause allergic rhinitis  DISCHARGE INSTRUCTIONS:   Call 911 for the following:   · You have chest pain or shortness of breath  Return to the emergency department if:   · You have severe pain  · You cough up blood  Contact your healthcare provider if:   · You have a fever  · You have ear or sinus pain, or a headache  · Your symptoms get worse, even after treatment  · You have yellow, green, brown, or bloody mucus coming from your nose  · Your nose is bleeding or you have pain inside your nose  · You have trouble sleeping because of your symptoms  · You have questions or concerns about your condition or care  Medicines:   · Medicines  help decrease your symptoms and clear your stuffy nose  · Take your medicine as directed  Contact your healthcare provider if you think your medicine is not helping or if you have side effects  Tell him of her if you are allergic to any medicine  Keep a list of the medicines, vitamins, and herbs you take  Include the amounts, and when and why you take them  Bring the list or the pill bottles to follow-up visits  Carry your medicine list with you in case of an emergency    How to manage allergic rhinitis:  The best way to manage allergic rhinitis is to avoid allergens that can trigger your symptoms  Any of the following may help decrease your symptoms:  · Rinse your nose and sinuses  with a salt water solution or use a salt water nasal spray  This will help thin the mucus in your nose and rinse away pollen and dirt  It will also help reduce swelling so you can breathe normally  Ask your healthcare provider how often to rinse your nose  · Reduce exposure to dust mites  Wash sheets and towels in hot water every week  Cover your pillows and mattresses with allergen-free covers  Limit the number of stuffed animals and soft toys your child has  Wash your child's toys in hot water regularly  Vacuum weekly and use a vacuum  with an air filter  If possible, get rid of carpets and curtains  These collect dust and dust mites  · Reduce exposure to pollen  Keep windows and doors closed in your house and car  Stay inside when air pollution or the pollen count is high  Run your air conditioner on recycle, and change air filters often  Shower and wash your hair before bed every night to rinse away pollen  · Reduce exposure to pet dander  If possible, do not keep cats, dogs, birds, or other pets  If you do keep pets in your home, keep them out of bedrooms and carpeted rooms  Bathe them often  · Reduce exposure to mold  Do not spend time in basements  Choose artificial plants instead of live plants  Keep your home's humidity at less than 45%  Do not have ponds or standing water in your home or yard  · Do not smoke  Avoid others who smoke  Ask your healthcare provider for information if you currently smoke and need help to quit  Follow up with your healthcare provider as directed: You may need to see an allergist often to control your symptoms  Write down your questions so you remember to ask them during your visits    © 2017 [facility] [place] [facility]– [place] Information is for End User's use only and may not be sold, redistributed or otherwise used for commercial purposes  All illustrations and images included in CareNotes® are the copyrighted property of [employer]  or Dorian Kirby  The above information is an  only  It is not intended as medical advice for individual conditions or treatments  Talk to your doctor, nurse or pharmacist before following any medical regimen to see if it is safe and effective for you  independent

## 2022-08-02 NOTE — OCCUPATIONAL THERAPY INITIAL EVALUATION ADULT - RANGE OF MOTION EXAMINATION, UPPER EXTREMITY
assessed unilaterally to maintain sternal precauations/bilateral UE Active ROM was WNL (within normal limits)

## 2022-08-02 NOTE — PHYSICAL THERAPY INITIAL EVALUATION ADULT - GAIT TRAINING, PT EVAL
Goal: amb 200with supervision by discharge , navigate 12 steps with HR require supervision by discharge

## 2022-08-02 NOTE — PHYSICAL THERAPY INITIAL EVALUATION ADULT - PERTINENT HX OF CURRENT PROBLEM, REHAB EVAL
51 y/o M PMH:  bladder cancer, DM, ED, HTN, HLD, Covid 1/21, ex-smoker, + FH MI ,Pt reports SOB and CP with exertion , unable to walk few blocks d/t chest pain, sometimes has L arm pain, pt had submaximal stress test, LHC recommended for exertional chest pain increasing in frequency

## 2022-08-02 NOTE — PROGRESS NOTE ADULT - SUBJECTIVE AND OBJECTIVE BOX
SUBJ: Patient seen and examined. No events overnight.       MEDICATIONS  (STANDING):  aspirin enteric coated 325 milliGRAM(s) Oral daily  chlorhexidine 4% Liquid 1 Application(s) Topical <User Schedule>  dextrose 50% Injectable 50 milliLiter(s) IV Push every 15 minutes  dextrose 50% Injectable 25 milliLiter(s) IV Push every 15 minutes  diltiazem    Tablet 30 milliGRAM(s) Oral every 8 hours  famotidine    Tablet 20 milliGRAM(s) Oral two times a day  guaiFENesin  milliGRAM(s) Oral every 12 hours  heparin   Injectable 5000 Unit(s) SubCutaneous every 8 hours  insulin regular Infusion 1 Unit(s)/Hr (1 mL/Hr) IV Continuous <Continuous>  ipratropium    for Nebulization 500 MICROGram(s) Nebulizer every 6 hours  ketorolac   Injectable 30 milliGRAM(s) IV Push every 8 hours  magnesium sulfate  IVPB 1 Gram(s) IV Intermittent every 12 hours  meperidine     Injectable 25 milliGRAM(s) IV Push once  metoprolol tartrate 50 milliGRAM(s) Oral every 8 hours  nitroglycerin  Infusion 15 MICROgram(s)/Min (4.5 mL/Hr) IV Continuous <Continuous>  polyethylene glycol 3350 17 Gram(s) Oral daily  senna 1 Tablet(s) Oral every 12 hours  sodium chloride 0.9%. 1000 milliLiter(s) (20 mL/Hr) IV Continuous <Continuous>    MEDICATIONS  (PRN):  oxyCODONE    IR 10 milliGRAM(s) Oral every 4 hours PRN Moderate Pain (4 - 6)  oxyCODONE    IR 5 milliGRAM(s) Oral every 4 hours PRN Mild Pain (1 - 3)            Vital Signs Last 24 Hrs  T(C): 38.5 (02 Aug 2022 18:00), Max: 38.6 (02 Aug 2022 16:00)  T(F): 101.3 (02 Aug 2022 18:00), Max: 101.5 (02 Aug 2022 16:00)  HR: 90 (02 Aug 2022 19:00) (76 - 111)  BP: 133/75 (02 Aug 2022 18:30) (94/53 - 139/82)  BP(mean): 99 (02 Aug 2022 18:30) (66 - 104)  RR: 29 (02 Aug 2022 19:00) (6 - 37)  SpO2: 97% (02 Aug 2022 19:00) (90% - 100%)    Parameters below as of 02 Aug 2022 18:00  Patient On (Oxygen Delivery Method): nasal cannula  O2 Flow (L/min): 2       REVIEW OF SYSTEMS:  CONSTITUTIONAL: No fever  NECK: No pain or stiffness  CARDIOVASCULAR: patient denies chest pain, shortness of breath or palpitations .  Repiratory: No cough or wheezing.  NEUROLOGICAL: No focal deficits to report.  GI: no BRBPR, no N,V,diarrhea.    PSYCHIATRY: normal mood and affect  HEENT: no nasal discharge, no ecchymosis  SKIN: no ecchymosis, no breakdown  MUSCULOSKELETAL: Full range of motion x4.      PHYSICAL EXAM:  GENERAL: NAD  HEAD:  Atraumatic, Normocephalic  NECK: Supple, No JVD  NERVOUS SYSTEM:  Alert & Oriented X3, Good concentration  CHEST/LUNG: Clear to anterior auscultation bilaterally  HEART: Regular rate and rhythm  ABDOMEN: Soft, Nontender, Nondistended;   EXTREMITIES:  No  edema  SKIN: No rashes or lesions    	  TELEMETRY:      LABS:                        9.2    4.94  )-----------( 162      ( 02 Aug 2022 13:35 )             28.6     08-02    140  |  105  |  7<L>  ----------------------------<  241<H>  3.8   |  24  |  0.6<L>    Ca    8.2<L>      02 Aug 2022 13:35  Mg     2.3     08-02    TPro  5.9<L>  /  Alb  4.6  /  TBili  0.5  /  DBili  x   /  AST  58<H>  /  ALT  57<H>  /  AlkPhos  35  08-02        PT/INR - ( 02 Aug 2022 02:00 )   PT: 14.00 sec;   INR: 1.22 ratio         PTT - ( 02 Aug 2022 02:00 )  PTT:28.4 sec    I&O's Summary    01 Aug 2022 07:01  -  02 Aug 2022 07:00  --------------------------------------------------------  IN: 3231 mL / OUT: 2180 mL / NET: 1051 mL    02 Aug 2022 07:01  -  02 Aug 2022 19:44  --------------------------------------------------------  IN: 2576 mL / OUT: 1657 mL / NET: 919 mL      BNP  RADIOLOGY & ADDITIONAL STUDIES:    IMPRESSION AND PLAN:    3V CAD S/P CABG  HTN  Mildly elevated LFTs  - Management as per primary team  - Continue ASA and DVT prophylaxis   - Will follow as needed

## 2022-08-02 NOTE — OCCUPATIONAL THERAPY INITIAL EVALUATION ADULT - GENERAL OBSERVATIONS, REHAB EVAL
Pt received seated in b/s chair in NAD, brother at bedside, pt agreeable to OT evaluation, +tele, +BP cuff, +pulse oxi, +A line, +multiple IV drips, left seated in b/s chair in NAD, all lines intact, vitals stable.

## 2022-08-02 NOTE — PHYSICAL THERAPY INITIAL EVALUATION ADULT - GENERAL OBSERVATIONS, REHAB EVAL
[phone] Patient encountered seated in bed side chair + multi lines , + partida , + A line , + + tele, + hi caryl o2 , acewraps B LES , NAD receptive to PT , RN estefani tan

## 2022-08-02 NOTE — PROGRESS NOTE ADULT - SUBJECTIVE AND OBJECTIVE BOX
CTU Attending Progress Daily Note     02 Aug 2022 07:35    Procedure:            CABG                                      POD#               1    Patient seen as post-op critical care follow-up    HPI:  51 y/o M PMH:  bladder cancer, DM, ED, HTN, HLD, Covid 1/21, ex-smoker, + FH MI                PSH: bladder tumor resection, umbilical hernia repair    Pt reports SOB and CP with exertion , unable to walk few blocks d/t chest pain, sometimes has L arm pain, pt had submaximal stress test, LHC recommended for exertional chest pain increasing in frequency        Interval event for past 24 hr:  MINE ENRIQUEZ  52y had uncontrolled HTN on NTG drip, also received lopressor for BP control    Current Complains:  MINE ENRIQUEZ has new complaints of post-op pain    REVIEW OF SYSTEMS:  CONSTITUTIONAL:  [-] weakness, [-] fevers, [-] chills  EYES/ENT: [-] visual changes, [-] vertigo, [-] throat pain   NECK: [-] pain, [-] stiffness  RESPIRATORY: [-] cough, [-] wheezing, [-] hemoptysis, [-] shortness of breath  CARDIOVASCULAR: [-] chest pain, [-] palpitations, [-] orthopnea  GASTROINTESTINAL:    [-]abdominal pain, [-] nausea, [-] vomiting, [-] hematemesis, [-] diarrhea, [-] constipation, [-] melena, [-] hematochezia.  GENITOURINARY: [-] dysuria, [-] frequency, [-] hematuria  NEUROLOGICAL: [-] numbness, [-] weakness  SKIN: [-] itching, [-] burning, [-] rashes, [-] lesions   All other review of systems is negative unless indicated above.    [  ] Unable to assess ROS because :    OBJECTIVE:  ICU Vital Signs Last 24 Hrs  T(C): 37.5 (02 Aug 2022 04:00), Max: 37.6 (02 Aug 2022 01:00)  T(F): 99.5 (02 Aug 2022 04:00), Max: 99.7 (02 Aug 2022 01:00)  HR: 80 (02 Aug 2022 07:00) (76 - 111)  BP: 107/67 (02 Aug 2022 07:00) (94/53 - 125/71)  BP(mean): 83 (02 Aug 2022 07:00) (66 - 92)  ABP: 118/54 (02 Aug 2022 07:00) (82/42 - 169/60)  ABP(mean): 69 (02 Aug 2022 07:00) (53 - 88)  RR: 26 (02 Aug 2022 07:00) (6 - 37)  SpO2: 92% (02 Aug 2022 07:00) (90% - 100%)    O2 Parameters below as of 02 Aug 2022 07:00  Patient On (Oxygen Delivery Method): mask, aerosol            I&O's Summary    01 Aug 2022 07:01  -  02 Aug 2022 07:00  --------------------------------------------------------  IN: 3231 mL / OUT: 2180 mL / NET: 1051 mL      Adult Advanced Hemodynamics Last 24 Hrs  CVP(mm Hg): 5 (02 Aug 2022 07:00) (0 - 240)  CVP(cm H2O): --  CO: 8.1 (02 Aug 2022 04:00) (5.4 - 10)  CI: 4.1 (02 Aug 2022 04:00) (2.7 - 5.1)  PA: 17/5 (02 Aug 2022 07:00) (10/1 - 31/13)  PA(mean): 11 (02 Aug 2022 07:00) (5 - 21)  PCWP: --  SVR: 759 (02 Aug 2022 04:00) (463 - 961)  SVRI: 1500 (02 Aug 2022 04:00) (908 - 1923)  PVR: --  PVRI: --  Mode: CPAP with PS  FiO2: 50  PEEP: 5  PS: 7  MAP: 8  PIP: 14      PHYSICAL EXAM:  General: WN/WD NAD    HEENT:     [+] NCAT  [+] EOMI  [-] Conjuctival edema   [-] Icterus   [-] Thrush   [-] ETT  [-] NGT/OGT    Neck:         [+] FROM   [-] JVD     [-] Nodes     [-] Masses    [+] Mid-line trachea    [-] Tracheostomy    Chest:         [-] Sternal click   [-] Sternal drainage   [+] Pacing wires   [+] Chest tubes   [-] SubQ emphysema    Lungs:          [+] CTA   [-] Rhonchi   [-] Rales    [-] Wheezing    [-] Decreased BS    [-] Dullness R L    Cardiac:       [+] S1 [+] S2    [+] RRR   [-] Irregular   [-] S3   [-] S4    [-] Murmurs    [-] Rub    Abdomen:    [+] BS    [+] Soft    [+] Non-tender     [-] Distended    [-] Organomegaly  [-] PEG    Extremities:   [-] Cyanosis U/L   [-] Clubbing  U/L  [-] LE/UE Edema   [+] Capillary refill    [+] Pulses     Neuro:        [+] Awake   [+]  Alert   [-] Confused   [-] Lethargic   [-] Sedated   [-] Generalized Weakness    Skin:        [-] Rashes    [-] Erythema   [+] Normal incisions   [+] IV sites intact   [-] Sacral decubitus    Tubes:  LINES:    CAPILLARY BLOOD GLUCOSE      POCT Blood Glucose.: 159 mg/dL (02 Aug 2022 06:57)    CAPILLARY BLOOD GLUCOSE      POCT Blood Glucose.: 159 mg/dL (02 Aug 2022 06:57)  POCT Blood Glucose.: 152 mg/dL (02 Aug 2022 06:23)  POCT Blood Glucose.: 110 mg/dL (02 Aug 2022 04:15)  POCT Blood Glucose.: 100 mg/dL (02 Aug 2022 02:04)  POCT Blood Glucose.: 97 mg/dL (02 Aug 2022 00:58)  POCT Blood Glucose.: 113 mg/dL (02 Aug 2022 00:04)  POCT Blood Glucose.: 113 mg/dL (01 Aug 2022 23:01)  POCT Blood Glucose.: 130 mg/dL (01 Aug 2022 21:14)  POCT Blood Glucose.: 128 mg/dL (01 Aug 2022 20:20)  POCT Blood Glucose.: 123 mg/dL (01 Aug 2022 19:25)  POCT Blood Glucose.: 146 mg/dL (01 Aug 2022 17:23)  POCT Blood Glucose.: 162 mg/dL (01 Aug 2022 15:56)  POCT Blood Glucose.: 156 mg/dL (01 Aug 2022 15:24)  POCT Blood Glucose.: 150 mg/dL (01 Aug 2022 14:19)  POCT Blood Glucose.: 131 mg/dL (01 Aug 2022 12:41)      HOSPITAL MEDICATIONS:  MEDICATIONS  (STANDING):  ALBUTerol    90 MICROgram(s) HFA Inhaler 2 Puff(s) Inhalation every 6 hours  amLODIPine   Tablet 2.5 milliGRAM(s) Oral daily  aspirin enteric coated 325 milliGRAM(s) Oral daily  chlorhexidine 0.12% Liquid 5 milliLiter(s) Oral Mucosa two times a day  chlorhexidine 4% Liquid 1 Application(s) Topical <User Schedule>  dexMEDEtomidine Infusion 0.5 MICROgram(s)/kG/Hr (10.3 mL/Hr) IV Continuous <Continuous>  dextrose 50% Injectable 50 milliLiter(s) IV Push every 15 minutes  dextrose 50% Injectable 25 milliLiter(s) IV Push every 15 minutes  famotidine Injectable 20 milliGRAM(s) IV Push every 12 hours  guaiFENesin  milliGRAM(s) Oral every 12 hours  insulin regular Infusion 1 Unit(s)/Hr (1 mL/Hr) IV Continuous <Continuous>  ipratropium 17 MICROgram(s) HFA Inhaler 2 Puff(s) Inhalation every 6 hours  ketorolac   Injectable 15 milliGRAM(s) IV Push every 4 hours  magnesium sulfate  IVPB 1 Gram(s) IV Intermittent every 12 hours  meperidine     Injectable 25 milliGRAM(s) IV Push once  metoprolol tartrate 25 milliGRAM(s) Oral every 12 hours  nitroglycerin  Infusion 15 MICROgram(s)/Min (4.5 mL/Hr) IV Continuous <Continuous>  norepinephrine Infusion 0.05 MICROgram(s)/kG/Min (7.7 mL/Hr) IV Continuous <Continuous>  polyethylene glycol 3350 17 Gram(s) Oral daily  propofol Infusion 25 MICROgram(s)/kG/Min (12.3 mL/Hr) IV Continuous <Continuous>  senna 1 Tablet(s) Oral every 12 hours  sodium chloride 0.9%. 1000 milliLiter(s) (20 mL/Hr) IV Continuous <Continuous>    MEDICATIONS  (PRN):  oxyCODONE    IR 10 milliGRAM(s) Oral every 4 hours PRN Moderate Pain (4 - 6)  oxyCODONE    IR 5 milliGRAM(s) Oral every 4 hours PRN Mild Pain (1 - 3)      LABS:  ABG - ( 02 Aug 2022 02:53 )  pH, Arterial: 7.44  pH, Blood: x     /  pCO2: 36    /  pO2: 115   / HCO3: 24    / Base Excess: 0.5   /  SaO2: 97.7                                    8.2    5.27  )-----------( 153      ( 02 Aug 2022 02:00 )             25.1     08-02    140  |  108  |  9<L>  ----------------------------<  90  4.0   |  21  |  0.5<L>    Ca    8.0<L>      02 Aug 2022 02:00  Mg     2.3     08-02    TPro  5.8<L>  /  Alb  4.5  /  TBili  0.4  /  DBili  x   /  AST  89<H>  /  ALT  61<H>  /  AlkPhos  26<L>  08-02    PT/INR - ( 02 Aug 2022 02:00 )   PT: 14.00 sec;   INR: 1.22 ratio         PTT - ( 02 Aug 2022 02:00 )  PTT:28.4 sec        RADIOLOGY:  Reviewed and interpreted by me  CXR from 08-02-22 shows [+] mild congestion, [-] pneumothorax, [+] R effusion/ basilar atelectasis , [-] cardiomegaly,   S-G Catheter in place, R/L TLC in place, R/L Chest Tubes in place    ECG :  Reviewed and interpreted by me: SR 84 + RBBB  QTC:    Assessment:  CAD SP CABG  uncontrolled HTN on nitroglycerin drip, also received lopressor for BP control  right basilar atelectasis  hypoxemia on O2 - secondary to post-op atelectasis  (it is Not acute hypoxemic respiratory failure)  acute blood loss anemia  hyperglycemia      PAST MEDICAL & SURGICAL HISTORY:  HTN (hypertension)      Diabetes      Bladder tumor      Hypercholesterolemia      Lung nodules      Hepatic steatosis      Erectile dysfunction      2019 novel coronavirus disease (COVID-19)  1/21      History of hernia repair      History of bladder surgery  TURBT X2          PLAN:  Neuro: Pain control  Pulm: Encourage coughing, deep breathing and use of incentive spirometry. Wean off supplemental oxygen as able. Daily CXR.   Cardio: Monitor telemetry/alarms. lopressor for BP control  GI: Tolerating diet. Continue stool softeners. Continue GI prophylaxis  Renal: monitor urine output, supplement electrolytes as needed  Vasc: Heparin SC/SCDs for DVT prophylaxis  Heme: Monitor H/H.   ID: Off antibiotics. Stable.  Endocrine: Monitor finger stick blood sugar and control hyperglycemia with insulin  Physical Therapy: OOB/ambulate  Tubes: Monitor Chest tube output      Discussed with Cardiothoracic Team at AM rounds.    45 minutes of critical care time spent providing medical care for patient's acute illness/conditions that impairs at least one vital organ system and/or poses a high risk of imminent or life threatening deterioration in the patient's condition. It includes time spent evaluating and treating the patient's acute illness as well as time spent reviewing labs, radiology, discussing goals of care with patient and/or patient's family, and discussing the case with a multidisciplinary team in an effort to prevent further life threatening deterioration or end organ damage. This time is independent of any procedures performed.

## 2022-08-03 LAB
ALBUMIN SERPL ELPH-MCNC: 4.1 G/DL — SIGNIFICANT CHANGE UP (ref 3.5–5.2)
ALP SERPL-CCNC: 33 U/L — SIGNIFICANT CHANGE UP (ref 30–115)
ALT FLD-CCNC: 42 U/L — HIGH (ref 0–41)
ANION GAP SERPL CALC-SCNC: 10 MMOL/L — SIGNIFICANT CHANGE UP (ref 7–14)
AST SERPL-CCNC: 32 U/L — SIGNIFICANT CHANGE UP (ref 0–41)
BASOPHILS # BLD AUTO: 0.01 K/UL — SIGNIFICANT CHANGE UP (ref 0–0.2)
BASOPHILS NFR BLD AUTO: 0.2 % — SIGNIFICANT CHANGE UP (ref 0–1)
BILIRUB SERPL-MCNC: 0.5 MG/DL — SIGNIFICANT CHANGE UP (ref 0.2–1.2)
BUN SERPL-MCNC: 7 MG/DL — LOW (ref 10–20)
CALCIUM SERPL-MCNC: 8.2 MG/DL — LOW (ref 8.5–10.1)
CHLORIDE SERPL-SCNC: 104 MMOL/L — SIGNIFICANT CHANGE UP (ref 98–110)
CO2 SERPL-SCNC: 24 MMOL/L — SIGNIFICANT CHANGE UP (ref 17–32)
CREAT SERPL-MCNC: 0.5 MG/DL — LOW (ref 0.7–1.5)
EGFR: 123 ML/MIN/1.73M2 — SIGNIFICANT CHANGE UP
EOSINOPHIL # BLD AUTO: 0.03 K/UL — SIGNIFICANT CHANGE UP (ref 0–0.7)
EOSINOPHIL NFR BLD AUTO: 0.6 % — SIGNIFICANT CHANGE UP (ref 0–8)
GAS PNL BLDA: SIGNIFICANT CHANGE UP
GLUCOSE BLDC GLUCOMTR-MCNC: 105 MG/DL — HIGH (ref 70–99)
GLUCOSE BLDC GLUCOMTR-MCNC: 112 MG/DL — HIGH (ref 70–99)
GLUCOSE BLDC GLUCOMTR-MCNC: 119 MG/DL — HIGH (ref 70–99)
GLUCOSE BLDC GLUCOMTR-MCNC: 126 MG/DL — HIGH (ref 70–99)
GLUCOSE BLDC GLUCOMTR-MCNC: 138 MG/DL — HIGH (ref 70–99)
GLUCOSE BLDC GLUCOMTR-MCNC: 141 MG/DL — HIGH (ref 70–99)
GLUCOSE BLDC GLUCOMTR-MCNC: 153 MG/DL — HIGH (ref 70–99)
GLUCOSE BLDC GLUCOMTR-MCNC: 171 MG/DL — HIGH (ref 70–99)
GLUCOSE BLDC GLUCOMTR-MCNC: 189 MG/DL — HIGH (ref 70–99)
GLUCOSE BLDC GLUCOMTR-MCNC: 249 MG/DL — HIGH (ref 70–99)
GLUCOSE BLDC GLUCOMTR-MCNC: 287 MG/DL — HIGH (ref 70–99)
GLUCOSE SERPL-MCNC: 131 MG/DL — HIGH (ref 70–99)
HCT VFR BLD CALC: 30.2 % — LOW (ref 42–52)
HGB BLD-MCNC: 9.6 G/DL — LOW (ref 14–18)
IMM GRANULOCYTES NFR BLD AUTO: 0.6 % — HIGH (ref 0.1–0.3)
LYMPHOCYTES # BLD AUTO: 0.85 K/UL — LOW (ref 1.2–3.4)
LYMPHOCYTES # BLD AUTO: 16.8 % — LOW (ref 20.5–51.1)
MAGNESIUM SERPL-MCNC: 2.1 MG/DL — SIGNIFICANT CHANGE UP (ref 1.8–2.4)
MCHC RBC-ENTMCNC: 25.9 PG — LOW (ref 27–31)
MCHC RBC-ENTMCNC: 31.8 G/DL — LOW (ref 32–37)
MCV RBC AUTO: 81.4 FL — SIGNIFICANT CHANGE UP (ref 80–94)
MONOCYTES # BLD AUTO: 0.61 K/UL — HIGH (ref 0.1–0.6)
MONOCYTES NFR BLD AUTO: 12 % — HIGH (ref 1.7–9.3)
NEUTROPHILS # BLD AUTO: 3.54 K/UL — SIGNIFICANT CHANGE UP (ref 1.4–6.5)
NEUTROPHILS NFR BLD AUTO: 69.8 % — SIGNIFICANT CHANGE UP (ref 42.2–75.2)
NRBC # BLD: 0 /100 WBCS — SIGNIFICANT CHANGE UP (ref 0–0)
PLATELET # BLD AUTO: 130 K/UL — SIGNIFICANT CHANGE UP (ref 130–400)
POTASSIUM SERPL-MCNC: 3.8 MMOL/L — SIGNIFICANT CHANGE UP (ref 3.5–5)
POTASSIUM SERPL-SCNC: 3.8 MMOL/L — SIGNIFICANT CHANGE UP (ref 3.5–5)
PROT SERPL-MCNC: 5.5 G/DL — LOW (ref 6–8)
RBC # BLD: 3.71 M/UL — LOW (ref 4.7–6.1)
RBC # FLD: 15.8 % — HIGH (ref 11.5–14.5)
SODIUM SERPL-SCNC: 138 MMOL/L — SIGNIFICANT CHANGE UP (ref 135–146)
WBC # BLD: 5.07 K/UL — SIGNIFICANT CHANGE UP (ref 4.8–10.8)
WBC # FLD AUTO: 5.07 K/UL — SIGNIFICANT CHANGE UP (ref 4.8–10.8)

## 2022-08-03 PROCEDURE — 71045 X-RAY EXAM CHEST 1 VIEW: CPT | Mod: 26

## 2022-08-03 PROCEDURE — 99233 SBSQ HOSP IP/OBS HIGH 50: CPT

## 2022-08-03 PROCEDURE — 93010 ELECTROCARDIOGRAM REPORT: CPT

## 2022-08-03 RX ORDER — POTASSIUM CHLORIDE 20 MEQ
20 PACKET (EA) ORAL ONCE
Refills: 0 | Status: COMPLETED | OUTPATIENT
Start: 2022-08-03 | End: 2022-08-03

## 2022-08-03 RX ORDER — CHLORHEXIDINE GLUCONATE 213 G/1000ML
1 SOLUTION TOPICAL
Refills: 0 | Status: DISCONTINUED | OUTPATIENT
Start: 2022-08-03 | End: 2022-08-06

## 2022-08-03 RX ORDER — DILTIAZEM HCL 120 MG
60 CAPSULE, EXT RELEASE 24 HR ORAL EVERY 8 HOURS
Refills: 0 | Status: DISCONTINUED | OUTPATIENT
Start: 2022-08-03 | End: 2022-08-06

## 2022-08-03 RX ORDER — METFORMIN HYDROCHLORIDE 850 MG/1
1000 TABLET ORAL EVERY 12 HOURS
Refills: 0 | Status: DISCONTINUED | OUTPATIENT
Start: 2022-08-03 | End: 2022-08-06

## 2022-08-03 RX ORDER — ACETAMINOPHEN 500 MG
650 TABLET ORAL ONCE
Refills: 0 | Status: COMPLETED | OUTPATIENT
Start: 2022-08-03 | End: 2022-08-03

## 2022-08-03 RX ORDER — METOPROLOL TARTRATE 50 MG
5 TABLET ORAL ONCE
Refills: 0 | Status: COMPLETED | OUTPATIENT
Start: 2022-08-03 | End: 2022-08-03

## 2022-08-03 RX ORDER — IBUPROFEN 200 MG
400 TABLET ORAL EVERY 8 HOURS
Refills: 0 | Status: COMPLETED | OUTPATIENT
Start: 2022-08-03 | End: 2022-08-05

## 2022-08-03 RX ORDER — ATORVASTATIN CALCIUM 80 MG/1
40 TABLET, FILM COATED ORAL AT BEDTIME
Refills: 0 | Status: DISCONTINUED | OUTPATIENT
Start: 2022-08-03 | End: 2022-08-06

## 2022-08-03 RX ORDER — FUROSEMIDE 40 MG
40 TABLET ORAL ONCE
Refills: 0 | Status: COMPLETED | OUTPATIENT
Start: 2022-08-03 | End: 2022-08-03

## 2022-08-03 RX ADMIN — Medication 50 MILLIGRAM(S): at 13:16

## 2022-08-03 RX ADMIN — Medication 100 GRAM(S): at 06:46

## 2022-08-03 RX ADMIN — Medication 100 GRAM(S): at 17:14

## 2022-08-03 RX ADMIN — INSULIN HUMAN 1 UNIT(S)/HR: 100 INJECTION, SOLUTION SUBCUTANEOUS at 07:31

## 2022-08-03 RX ADMIN — OXYCODONE HYDROCHLORIDE 10 MILLIGRAM(S): 5 TABLET ORAL at 17:21

## 2022-08-03 RX ADMIN — Medication 60 MILLIGRAM(S): at 21:34

## 2022-08-03 RX ADMIN — Medication 30 MILLIGRAM(S): at 07:01

## 2022-08-03 RX ADMIN — FAMOTIDINE 20 MILLIGRAM(S): 10 INJECTION INTRAVENOUS at 06:46

## 2022-08-03 RX ADMIN — Medication 50 MILLIGRAM(S): at 06:46

## 2022-08-03 RX ADMIN — Medication 5 MILLIGRAM(S): at 04:00

## 2022-08-03 RX ADMIN — Medication 500 MICROGRAM(S): at 07:46

## 2022-08-03 RX ADMIN — HEPARIN SODIUM 5000 UNIT(S): 5000 INJECTION INTRAVENOUS; SUBCUTANEOUS at 06:47

## 2022-08-03 RX ADMIN — OXYCODONE HYDROCHLORIDE 10 MILLIGRAM(S): 5 TABLET ORAL at 21:30

## 2022-08-03 RX ADMIN — Medication 60 MILLIGRAM(S): at 13:16

## 2022-08-03 RX ADMIN — ATORVASTATIN CALCIUM 40 MILLIGRAM(S): 80 TABLET, FILM COATED ORAL at 21:34

## 2022-08-03 RX ADMIN — Medication 650 MILLIGRAM(S): at 13:15

## 2022-08-03 RX ADMIN — Medication 600 MILLIGRAM(S): at 17:13

## 2022-08-03 RX ADMIN — OXYCODONE HYDROCHLORIDE 10 MILLIGRAM(S): 5 TABLET ORAL at 22:00

## 2022-08-03 RX ADMIN — Medication 600 MILLIGRAM(S): at 06:46

## 2022-08-03 RX ADMIN — FAMOTIDINE 20 MILLIGRAM(S): 10 INJECTION INTRAVENOUS at 17:13

## 2022-08-03 RX ADMIN — Medication 30 MILLIGRAM(S): at 06:30

## 2022-08-03 RX ADMIN — SENNA PLUS 1 TABLET(S): 8.6 TABLET ORAL at 17:13

## 2022-08-03 RX ADMIN — Medication 40 MILLIGRAM(S): at 09:06

## 2022-08-03 RX ADMIN — Medication 500 MICROGRAM(S): at 13:14

## 2022-08-03 RX ADMIN — HEPARIN SODIUM 5000 UNIT(S): 5000 INJECTION INTRAVENOUS; SUBCUTANEOUS at 13:17

## 2022-08-03 RX ADMIN — OXYCODONE HYDROCHLORIDE 10 MILLIGRAM(S): 5 TABLET ORAL at 11:40

## 2022-08-03 RX ADMIN — Medication 5 MILLIGRAM(S): at 04:30

## 2022-08-03 RX ADMIN — OXYCODONE HYDROCHLORIDE 10 MILLIGRAM(S): 5 TABLET ORAL at 01:37

## 2022-08-03 RX ADMIN — METFORMIN HYDROCHLORIDE 1000 MILLIGRAM(S): 850 TABLET ORAL at 11:38

## 2022-08-03 RX ADMIN — Medication 400 MILLIGRAM(S): at 18:00

## 2022-08-03 RX ADMIN — Medication 100 MILLIEQUIVALENT(S): at 07:30

## 2022-08-03 RX ADMIN — OXYCODONE HYDROCHLORIDE 10 MILLIGRAM(S): 5 TABLET ORAL at 12:10

## 2022-08-03 RX ADMIN — Medication 20 MILLIEQUIVALENT(S): at 09:06

## 2022-08-03 RX ADMIN — Medication 650 MILLIGRAM(S): at 13:45

## 2022-08-03 RX ADMIN — Medication 650 MILLIGRAM(S): at 04:00

## 2022-08-03 RX ADMIN — POLYETHYLENE GLYCOL 3350 17 GRAM(S): 17 POWDER, FOR SOLUTION ORAL at 11:38

## 2022-08-03 RX ADMIN — METFORMIN HYDROCHLORIDE 1000 MILLIGRAM(S): 850 TABLET ORAL at 17:13

## 2022-08-03 RX ADMIN — Medication 50 MILLIGRAM(S): at 21:34

## 2022-08-03 RX ADMIN — HEPARIN SODIUM 5000 UNIT(S): 5000 INJECTION INTRAVENOUS; SUBCUTANEOUS at 21:35

## 2022-08-03 RX ADMIN — CHLORHEXIDINE GLUCONATE 1 APPLICATION(S): 213 SOLUTION TOPICAL at 07:01

## 2022-08-03 RX ADMIN — Medication 650 MILLIGRAM(S): at 05:00

## 2022-08-03 RX ADMIN — OXYCODONE HYDROCHLORIDE 10 MILLIGRAM(S): 5 TABLET ORAL at 02:15

## 2022-08-03 RX ADMIN — Medication 325 MILLIGRAM(S): at 11:39

## 2022-08-03 RX ADMIN — SENNA PLUS 1 TABLET(S): 8.6 TABLET ORAL at 06:46

## 2022-08-03 RX ADMIN — Medication 30 MILLIGRAM(S): at 06:46

## 2022-08-03 RX ADMIN — OXYCODONE HYDROCHLORIDE 10 MILLIGRAM(S): 5 TABLET ORAL at 16:57

## 2022-08-03 NOTE — PROGRESS NOTE ADULT - ASSESSMENT
Assessment/Plan:  CAD-s/p CABG x 4-POD #2  1-BP control-metoprolol, diltiazem  2-serum glucose control-insulin infusion, start Januvia, Metoprolol  3-fluid overload-diuresis  4-acute blood loss anemia-stable, monitor Hb/Hct daily  9-weonsrnvjmx-nhktyou potassium  7-tfhvimqaygcqpxz-rwkdpextp, hold statin, monitor daily

## 2022-08-03 NOTE — PROGRESS NOTE ADULT - SUBJECTIVE AND OBJECTIVE BOX
MINE ENRIQUEZ  MRN#: [id number]  Subjective:  Patient was seen and evalauted on AM rounds offerring no specific compliants at this time.    OBJECTIVE:  ICU Vital Signs Last 24 Hrs  T(C): 38.3 (03 Aug 2022 12:00), Max: 38.6 (02 Aug 2022 16:00)  T(F): 100.9 (03 Aug 2022 12:00), Max: 101.5 (02 Aug 2022 16:00)  HR: 95 (03 Aug 2022 12:00) (78 - 101)  BP: 142/80 (03 Aug 2022 12:00) (95/53 - 152/83)  BP(mean): 104 (03 Aug 2022 12:00) (68 - 112)  ABP: 177/76 (03 Aug 2022 12:00) (109/46 - 206/90)  ABP(mean): 103 (03 Aug 2022 12:00) (64 - 121)  RR: 32 (03 Aug 2022 12:00) (19 - 35)  SpO2: 94% (03 Aug 2022 12:00) (92% - 100%)    O2 Parameters below as of 03 Aug 2022 12:00  Patient On (Oxygen Delivery Method): room air            08-02 @ 07:01 - 08-03 @ 07:00  --------------------------------------------------------  IN: 3428 mL / OUT: 2647 mL / NET: 781 mL    08-03 @ 07:01 - 08-03 @ 12:10  --------------------------------------------------------  IN: 527 mL / OUT: 210 mL / NET: 317 mL      CAPILLARY BLOOD GLUCOSE      POCT Blood Glucose.: 189 mg/dL (03 Aug 2022 11:49)      PHYSICAL EXAM:Daily     Daily   General: WN/WD NAD    HEENT:     + NCAT  + EOMI  - Conjuctival edema   - Icterus   - Thrush   - ETT  - NGT/OGT    Neck:         + FROM    - JVD     - Nodes     - Masses    + Mid-line trachea   - Tracheostomy    Chest:         - Sternal click  - Sternal drainage  + Pacing wires    - SubQ emphysema    Lungs:          + CTA   - Rhonchi    - Rales    - Wheezing     - Decreased BS   - Dullness R L    Cardiac:       + S1 + S2    + RRR   - Irregular   - S3  - S4    - Murmurs   - Rub   - Hamman’s sign     Abdomen:    + BS     + Soft    + Non-tender     - Distended    - Organomegaly  - PEG    Extremities:   - Cyanosis U/L   - Clubbing  U/L  - LE/UE Edema   + Capillary refill    + Pulses     Neuro:        + Awake   +  Alert   - Confused   - Lethargic   - Sedated   - Generalized Weakness    Skin:        - Rashes    - Erythema   + Normal incisions   + IV sites intact  - Sacral decubitus    HOSPITAL MEDICATIONS:  MEDICATIONS  (STANDING):  aspirin enteric coated 325 milliGRAM(s) Oral daily  atorvastatin 40 milliGRAM(s) Oral at bedtime  chlorhexidine 2% Cloths 1 Application(s) Topical <User Schedule>  dextrose 50% Injectable 50 milliLiter(s) IV Push every 15 minutes  dextrose 50% Injectable 25 milliLiter(s) IV Push every 15 minutes  diltiazem    Tablet 60 milliGRAM(s) Oral every 8 hours  famotidine    Tablet 20 milliGRAM(s) Oral two times a day  guaiFENesin  milliGRAM(s) Oral every 12 hours  heparin   Injectable 5000 Unit(s) SubCutaneous every 8 hours  insulin regular Infusion 1 Unit(s)/Hr (1 mL/Hr) IV Continuous <Continuous>  ipratropium    for Nebulization 500 MICROGram(s) Nebulizer every 6 hours  magnesium sulfate  IVPB 1 Gram(s) IV Intermittent every 12 hours  meperidine     Injectable 25 milliGRAM(s) IV Push once  metFORMIN 1000 milliGRAM(s) Oral every 12 hours  metoprolol tartrate 50 milliGRAM(s) Oral every 8 hours  nitroglycerin  Infusion 15 MICROgram(s)/Min (4.5 mL/Hr) IV Continuous <Continuous>  polyethylene glycol 3350 17 Gram(s) Oral daily  senna 1 Tablet(s) Oral every 12 hours  sitaGLIPtin 50 milliGRAM(s) Oral daily  sodium chloride 0.9%. 1000 milliLiter(s) (20 mL/Hr) IV Continuous <Continuous>    MEDICATIONS  (PRN):  oxyCODONE    IR 10 milliGRAM(s) Oral every 4 hours PRN Moderate Pain (4 - 6)  oxyCODONE    IR 5 milliGRAM(s) Oral every 4 hours PRN Mild Pain (1 - 3)      LABS:                        9.6    5.07  )-----------( 130      ( 03 Aug 2022 05:05 )             30.2    08-03    138  |  104  |  7<L>  ----------------------------<  131<H>  3.8   |  24  |  0.5<L>    Ca    8.2<L>      03 Aug 2022 05:05  Mg     2.1     08-03    TPro  5.5<L>  /  Alb  4.1  /  TBili  0.5  /  DBili  x   /  AST  32  /  ALT  42<H>  /  AlkPhos  33  08-03    PT/INR - ( 02 Aug 2022 02:00 )   PT: 14.00 sec;   INR: 1.22 ratio         PTT - ( 02 Aug 2022 02:00 )  PTT:28.4 sec LIVER FUNCTIONS - ( 03 Aug 2022 05:05 )  Alb: 4.1 g/dL / Pro: 5.5 g/dL / ALK PHOS: 33 U/L / ALT: 42 U/L / AST: 32 U/L / GGT: x               RADIOLOGY:  X Reviewed and interpreted by me: bilateral opacities/effusions    CARDIOPULMONARY DYSFUNCTION  - Respiratory status required supplemental oxygen & the following of continuous pulse oximetry for support & to prevent decompensation  - Continued early mobilization as tolerated  - Addressed analgesic regimen to optimize function    PREVENTION-PROPHYLAXIS  - ASA continued for graft occlusion-thromboembolism prophylaxis  - Lipitor was also started for long term graft patency  - Heparin continued for VTE prophylaxis in addition to Venodyne boots  - Pepcid maintained for GI bleeding prophylaxis  - Lopressor continued for atrial fibrillation prophylaxis  - Metabolic stability & infection prophylaxis required review and adjustment of regular Insulin sliding scale and gylcemic regimen while following serial glucose levels to help achieve & maintain euglycemia  - Reviewed & addressed surgical site infection prophylaxis regimen

## 2022-08-03 NOTE — PROGRESS NOTE ADULT - SUBJECTIVE AND OBJECTIVE BOX
OPERATIVE PROCEDURE(s): CABGx4 + left radial               POD #  2                     SURGEON(s): ASHLYN Galo  SUBJECTIVE ASSESSMENT: 52y Male patient seen and examined at bedside.    Vital Signs Last 24 Hrs  T(F): 100 (03 Aug 2022 08:00), Max: 101.5 (02 Aug 2022 16:00)  HR: 100 (03 Aug 2022 10:00) (78 - 101)  BP: 137/75 (03 Aug 2022 10:00) (95/53 - 152/83)  BP(mean): 99 (03 Aug 2022 10:00) (68 - 112)  ABP: 179/69 (03 Aug 2022 10:00) (109/46 - 206/90)  ABP(mean): 100 (03 Aug 2022 10:00)  RR: 32 (03 Aug 2022 10:00) (19 - 35)  SpO2: 92% (03 Aug 2022 10:00) (92% - 100%) [facility]      I&O's Detail    02 Aug 2022 07:01  -  03 Aug 2022 07:00  --------------------------------------------------------  IN:    Insulin: 88 mL    IV PiggyBack: 100 mL    Nitroglycerin: 350 mL    Oral Fluid: 2410 mL    sodium chloride 0.9%: 480 mL  Total IN: 3428 mL    OUT:    Chest Tube (mL): 20 mL    Chest Tube (mL): 2 mL    Indwelling Catheter - Urethral (mL): 2625 mL  Total OUT: 2647 mL    Net: I&O's Detail    01 Aug 2022 07:01  -  02 Aug 2022 07:00  --------------------------------------------------------  Total NET: 1051 mL    02 Aug 2022 07:01  -  03 Aug 2022 07:00  --------------------------------------------------------  Total NET: 781 mL        CAPILLARY BLOOD GLUCOSE      POCT Blood Glucose.: 287 mg/dL (03 Aug 2022 10:46)  POCT Blood Glucose.: 249 mg/dL (03 Aug 2022 08:16)  POCT Blood Glucose.: 105 mg/dL (03 Aug 2022 06:01)  POCT Blood Glucose.: 141 mg/dL (03 Aug 2022 04:13)  POCT Blood Glucose.: 171 mg/dL (03 Aug 2022 01:00)  POCT Blood Glucose.: 97 mg/dL (02 Aug 2022 23:17)  POCT Blood Glucose.: 145 mg/dL (02 Aug 2022 21:13)  POCT Blood Glucose.: 126 mg/dL (02 Aug 2022 19:05)  POCT Blood Glucose.: 88 mg/dL (02 Aug 2022 18:04)  POCT Blood Glucose.: 84 mg/dL (02 Aug 2022 17:31)  POCT Blood Glucose.: 172 mg/dL (02 Aug 2022 15:58)  POCT Blood Glucose.: 238 mg/dL (02 Aug 2022 13:10)    A1C with Estimated Average Glucose Result: 6.9 % (07-30-22 @ 11:01)      Physical Exam:  General: NAD; [id number]  Cardiac: S1/S2, RRR, no murmur, no rubs  Lungs: decreased bs bilaterally   Abdomen: Soft/NT/ND; positive bowel sounds x 4  Sternum: Intact, no click, incision healing well with no drainage  Incisions: Incisions clean/dry/intact  Extremities: No edema b/l lower extremities; good capillary refill; no cyanosis; palpable 1+ pedal pulses b/l    Central Venous Catheter: Yes: critical illness, intravenous access  Day # 2  [place] Catheter: Yes: critical illness; monitor strict i/o's                    Day # 2  EPICARDIAL WIRES:  YES                                                                         Day # 2  BOWEL MOVEMENT:  [] YES [x] NO, If No, Timing since last BM Day #        LABS:                        9.6<L>  5.07  )-----------( 130      ( 03 Aug 2022 05:05 )             30.2<L>                        9.2<L>  4.94  )-----------( 162      ( 02 Aug 2022 13:35 )             28.6<L>    08-03    138  |  104  |  7<L>  ----------------------------<  131<H>  3.8   |  24  |  0.5<L>  08-02    140  |  105  |  7<L>  ----------------------------<  241<H>  3.8   |  24  |  0.6<L>    Ca    8.2<L>      03 Aug 2022 05:05  Mg     2.1     08-03    TPro  5.5<L> [6.0 - 8.0]  /  Alb  4.1 [3.5 - 5.2]  /  TBili  0.5 [0.2 - 1.2]  /  DBili  x   /  AST  32 [0 - 41]  /  ALT  42<H> [0 - 41]  /  AlkPhos  33 [30 - 115]  08-03    PT/INR - ( 02 Aug 2022 02:00 )   PT: ;   INR: 1.22 ratio         PT/INR - ( 01 Aug 2022 13:40 )   PT: ;   INR: 1.22 ratio         PTT - ( 02 Aug 2022 02:00 )  PTT:28.4 sec, PTT - ( 01 Aug 2022 13:40 )  PTT:32.2 sec    ABG - ( 03 Aug 2022 02:27 )  pH: 7.45  /  pCO2: 37    /  pO2: 74    / HCO3: 26    / Base Excess: 1.7   /  SaO2: 95.3  /  LA: 0.60       Allergies    No Known Allergies    Intolerances      MEDICATIONS  (STANDING):  aspirin enteric coated 325 milliGRAM(s) Oral daily  atorvastatin 40 milliGRAM(s) Oral at bedtime  chlorhexidine 2% Cloths 1 Application(s) Topical <User Schedule>  dextrose 50% Injectable 50 milliLiter(s) IV Push every 15 minutes  dextrose 50% Injectable 25 milliLiter(s) IV Push every 15 minutes  diltiazem    Tablet 60 milliGRAM(s) Oral every 8 hours  famotidine    Tablet 20 milliGRAM(s) Oral two times a day  guaiFENesin  milliGRAM(s) Oral every 12 hours  heparin   Injectable 5000 Unit(s) SubCutaneous every 8 hours  insulin regular Infusion 1 Unit(s)/Hr (1 mL/Hr) IV Continuous <Continuous>  ipratropium    for Nebulization 500 MICROGram(s) Nebulizer every 6 hours  magnesium sulfate  IVPB 1 Gram(s) IV Intermittent every 12 hours  meperidine     Injectable 25 milliGRAM(s) IV Push once  metFORMIN 1000 milliGRAM(s) Oral every 12 hours  metoprolol tartrate 50 milliGRAM(s) Oral every 8 hours  nitroglycerin  Infusion 15 MICROgram(s)/Min (4.5 mL/Hr) IV Continuous <Continuous>  polyethylene glycol 3350 17 Gram(s) Oral daily  senna 1 Tablet(s) Oral every 12 hours  sitaGLIPtin 50 milliGRAM(s) Oral daily  sodium chloride 0.9%. 1000 milliLiter(s) (20 mL/Hr) IV Continuous <Continuous>    MEDICATIONS  (PRN):  oxyCODONE    IR 10 milliGRAM(s) Oral every 4 hours PRN Moderate Pain (4 - 6)  oxyCODONE    IR 5 milliGRAM(s) Oral every 4 hours PRN Mild Pain (1 - 3)    Home Medications:  Aspirin Low Dose 81 mg oral tablet, chewable: 1 tab(s) orally once a day (29 Jul 2022 07:14)  enalapril-hydrochlorothiazide 10 mg-25 mg oral tablet: 1 tab(s) orally once a day (29 Jul 2022 07:14)  gemfibrozil 600 mg oral tablet: 1 tab(s) orally 2 times a day (29 Jul 2022 07:14)  Janumet 50 mg-1000 mg oral tablet: 1 tab(s) orally 2 times a day (29 Jul 2022 07:14)  Metoprolol Succinate ER 25 mg oral tablet, extended release: 1 tab(s) orally once a day (29 Jul 2022 07:14)  simvastatin 40 mg oral tablet: 1 tab(s) orally once a day (at bedtime) (29 Jul 2022 07:14)      Post-Op Beta-Blockers: Yes [x], No[], If No, then contraindication:  Post-Op Aspirin: Yes [x],  No [], If No, then contraindication:  Post-Op Statin: Yes [], No[x], If No, then contraindication: elevated lfts    Ambulation/Activity Status: OOB/AMB    Assessment/Plan:  52y Male status-post CABGx4 + left radial               POD #  2     - Case and plan discussed with CTU Intensivist and CT Surgeon - Dr. Galo  - Continue CTU supportive care and ongoing plan of care as per continuing CTU rounds.   - Continue DVT/GI prophylaxis  - Incentive Spirometry 10 times an hour  - Continue to advance physical activity as tolerated and continue PT/OT as directed  1. CAD s/p CABG: Continue ASA, statin, BB and cardizem  to prevent radial vasospasm, pain control, d/c swan, d/c chest tubes   2. A. Fib prophylaxis: cont bb, cardizem, monitor electrolytes, mag 1g bid  3. COPD/Hypoxia: cont nebs, mucinex, wean o2 as tolerated, encourage incentive spirometry  4. DM/Glucose Control: A1c 6.9%; cont insulin gtt for now and restart home dose januvia 50 mg po qd w/Metformin 1g po BID.

## 2022-08-03 NOTE — PROGRESS NOTE ADULT - SUBJECTIVE AND OBJECTIVE BOX
SUBJ: Patient seen and examined. No events overnight.       MEDICATIONS  (STANDING):  aspirin enteric coated 325 milliGRAM(s) Oral daily  atorvastatin 40 milliGRAM(s) Oral at bedtime  chlorhexidine 2% Cloths 1 Application(s) Topical <User Schedule>  dextrose 50% Injectable 50 milliLiter(s) IV Push every 15 minutes  dextrose 50% Injectable 25 milliLiter(s) IV Push every 15 minutes  diltiazem    Tablet 60 milliGRAM(s) Oral every 8 hours  famotidine    Tablet 20 milliGRAM(s) Oral two times a day  guaiFENesin  milliGRAM(s) Oral every 12 hours  heparin   Injectable 5000 Unit(s) SubCutaneous every 8 hours  ibuprofen  Tablet. 400 milliGRAM(s) Oral every 8 hours  insulin regular Infusion 1 Unit(s)/Hr (1 mL/Hr) IV Continuous <Continuous>  ipratropium    for Nebulization 500 MICROGram(s) Nebulizer every 6 hours  magnesium sulfate  IVPB 1 Gram(s) IV Intermittent every 12 hours  meperidine     Injectable 25 milliGRAM(s) IV Push once  metFORMIN 1000 milliGRAM(s) Oral every 12 hours  metoprolol tartrate 50 milliGRAM(s) Oral every 8 hours  nitroglycerin  Infusion 15 MICROgram(s)/Min (4.5 mL/Hr) IV Continuous <Continuous>  polyethylene glycol 3350 17 Gram(s) Oral daily  senna 1 Tablet(s) Oral every 12 hours  sitaGLIPtin 50 milliGRAM(s) Oral daily  sodium chloride 0.9%. 1000 milliLiter(s) (20 mL/Hr) IV Continuous <Continuous>    MEDICATIONS  (PRN):  oxyCODONE    IR 10 milliGRAM(s) Oral every 4 hours PRN Moderate Pain (4 - 6)  oxyCODONE    IR 5 milliGRAM(s) Oral every 4 hours PRN Mild Pain (1 - 3)            Vital Signs Last 24 Hrs  T(C): 38.2 (03 Aug 2022 16:00), Max: 38.4 (03 Aug 2022 13:00)  T(F): 100.8 (03 Aug 2022 16:00), Max: 101.1 (03 Aug 2022 13:00)  HR: 86 (03 Aug 2022 18:00) (78 - 109)  BP: 100/59 (03 Aug 2022 18:00) (95/53 - 152/83)  BP(mean): 75 (03 Aug 2022 18:00) (68 - 112)  RR: 25 (03 Aug 2022 18:00) (19 - 34)  SpO2: 90% (03 Aug 2022 18:00) (90% - 98%)    Parameters below as of 03 Aug 2022 18:00  Patient On (Oxygen Delivery Method): room air         REVIEW OF SYSTEMS:  CONSTITUTIONAL: No fever  NECK: No pain or stiffness  CARDIOVASCULAR: patient denies chest pain, shortness of breath or palpitations .  Repiratory: No cough or wheezing.  NEUROLOGICAL: No focal deficits to report.  GI: no BRBPR, no N,V,diarrhea.    PSYCHIATRY: normal mood and affect  HEENT: no nasal discharge, no ecchymosis  SKIN: no ecchymosis, no breakdown  MUSCULOSKELETAL: Full range of motion x4.      PHYSICAL EXAM:  GENERAL: NAD  HEAD:  Atraumatic, Normocephalic  NECK: Supple, No JVD  NERVOUS SYSTEM:  Alert & Oriented X3, Good concentration  CHEST/LUNG: Clear to anterior auscultation bilaterally  HEART: Regular rate and rhythm;  ABDOMEN: Soft, Nontender, Nondistended;   EXTREMITIES:  No  edema  SKIN: No rashes or lesions    	  TELEMETRY:      LABS:                        9.6    5.07  )-----------( 130      ( 03 Aug 2022 05:05 )             30.2     08-03    138  |  104  |  7<L>  ----------------------------<  131<H>  3.8   |  24  |  0.5<L>    Ca    8.2<L>      03 Aug 2022 05:05  Mg     2.1     08-03    TPro  5.5<L>  /  Alb  4.1  /  TBili  0.5  /  DBili  x   /  AST  32  /  ALT  42<H>  /  AlkPhos  33  08-03        PT/INR - ( 02 Aug 2022 02:00 )   PT: 14.00 sec;   INR: 1.22 ratio         PTT - ( 02 Aug 2022 02:00 )  PTT:28.4 sec    I&O's Summary    02 Aug 2022 07:01  -  03 Aug 2022 07:00  --------------------------------------------------------  IN: 3428 mL / OUT: 2647 mL / NET: 781 mL    03 Aug 2022 07:01  -  03 Aug 2022 19:18  --------------------------------------------------------  IN: 1544 mL / OUT: 1560 mL / NET: -16 mL      BNP  RADIOLOGY & ADDITIONAL STUDIES:    IMPRESSION AND PLAN:    3V CAD S/P CABG  HTN   LFTs normalized   - Management as per primary team  - Continue ASA and Statin   - Will follow as needed

## 2022-08-04 LAB
BASOPHILS # BLD AUTO: 0.03 K/UL — SIGNIFICANT CHANGE UP (ref 0–0.2)
BASOPHILS NFR BLD AUTO: 0.5 % — SIGNIFICANT CHANGE UP (ref 0–1)
EOSINOPHIL # BLD AUTO: 0.31 K/UL — SIGNIFICANT CHANGE UP (ref 0–0.7)
EOSINOPHIL NFR BLD AUTO: 4.7 % — SIGNIFICANT CHANGE UP (ref 0–8)
GAS PNL BLDA: SIGNIFICANT CHANGE UP
GLUCOSE BLDC GLUCOMTR-MCNC: 108 MG/DL — HIGH (ref 70–99)
GLUCOSE BLDC GLUCOMTR-MCNC: 109 MG/DL — HIGH (ref 70–99)
GLUCOSE BLDC GLUCOMTR-MCNC: 118 MG/DL — HIGH (ref 70–99)
GLUCOSE BLDC GLUCOMTR-MCNC: 157 MG/DL — HIGH (ref 70–99)
GLUCOSE BLDC GLUCOMTR-MCNC: 226 MG/DL — HIGH (ref 70–99)
GLUCOSE BLDC GLUCOMTR-MCNC: 95 MG/DL — SIGNIFICANT CHANGE UP (ref 70–99)
HCT VFR BLD CALC: 30.5 % — LOW (ref 42–52)
HGB BLD-MCNC: 9.9 G/DL — LOW (ref 14–18)
IMM GRANULOCYTES NFR BLD AUTO: 0.6 % — HIGH (ref 0.1–0.3)
LYMPHOCYTES # BLD AUTO: 1.39 K/UL — SIGNIFICANT CHANGE UP (ref 1.2–3.4)
LYMPHOCYTES # BLD AUTO: 20.9 % — SIGNIFICANT CHANGE UP (ref 20.5–51.1)
MAGNESIUM SERPL-MCNC: 2.2 MG/DL — SIGNIFICANT CHANGE UP (ref 1.8–2.4)
MCHC RBC-ENTMCNC: 25.9 PG — LOW (ref 27–31)
MCHC RBC-ENTMCNC: 32.5 G/DL — SIGNIFICANT CHANGE UP (ref 32–37)
MCV RBC AUTO: 79.8 FL — LOW (ref 80–94)
MONOCYTES # BLD AUTO: 0.76 K/UL — HIGH (ref 0.1–0.6)
MONOCYTES NFR BLD AUTO: 11.4 % — HIGH (ref 1.7–9.3)
NEUTROPHILS # BLD AUTO: 4.13 K/UL — SIGNIFICANT CHANGE UP (ref 1.4–6.5)
NEUTROPHILS NFR BLD AUTO: 61.9 % — SIGNIFICANT CHANGE UP (ref 42.2–75.2)
NRBC # BLD: 0 /100 WBCS — SIGNIFICANT CHANGE UP (ref 0–0)
PLATELET # BLD AUTO: 187 K/UL — SIGNIFICANT CHANGE UP (ref 130–400)
RBC # BLD: 3.82 M/UL — LOW (ref 4.7–6.1)
RBC # FLD: 15.8 % — HIGH (ref 11.5–14.5)
WBC # BLD: 6.66 K/UL — SIGNIFICANT CHANGE UP (ref 4.8–10.8)
WBC # FLD AUTO: 6.66 K/UL — SIGNIFICANT CHANGE UP (ref 4.8–10.8)

## 2022-08-04 PROCEDURE — 93010 ELECTROCARDIOGRAM REPORT: CPT

## 2022-08-04 PROCEDURE — 71045 X-RAY EXAM CHEST 1 VIEW: CPT | Mod: 26

## 2022-08-04 PROCEDURE — 99233 SBSQ HOSP IP/OBS HIGH 50: CPT

## 2022-08-04 RX ORDER — DEXTROSE 50 % IN WATER 50 %
25 SYRINGE (ML) INTRAVENOUS ONCE
Refills: 0 | Status: DISCONTINUED | OUTPATIENT
Start: 2022-08-04 | End: 2022-08-06

## 2022-08-04 RX ORDER — SODIUM CHLORIDE 9 MG/ML
1000 INJECTION, SOLUTION INTRAVENOUS
Refills: 0 | Status: DISCONTINUED | OUTPATIENT
Start: 2022-08-04 | End: 2022-08-06

## 2022-08-04 RX ORDER — GLUCAGON INJECTION, SOLUTION 0.5 MG/.1ML
1 INJECTION, SOLUTION SUBCUTANEOUS ONCE
Refills: 0 | Status: DISCONTINUED | OUTPATIENT
Start: 2022-08-04 | End: 2022-08-06

## 2022-08-04 RX ORDER — DEXTROSE 50 % IN WATER 50 %
15 SYRINGE (ML) INTRAVENOUS ONCE
Refills: 0 | Status: DISCONTINUED | OUTPATIENT
Start: 2022-08-04 | End: 2022-08-06

## 2022-08-04 RX ORDER — INSULIN LISPRO 100/ML
5 VIAL (ML) SUBCUTANEOUS
Refills: 0 | Status: DISCONTINUED | OUTPATIENT
Start: 2022-08-04 | End: 2022-08-05

## 2022-08-04 RX ORDER — INSULIN LISPRO 100/ML
VIAL (ML) SUBCUTANEOUS
Refills: 0 | Status: DISCONTINUED | OUTPATIENT
Start: 2022-08-04 | End: 2022-08-06

## 2022-08-04 RX ORDER — INSULIN GLARGINE 100 [IU]/ML
30 INJECTION, SOLUTION SUBCUTANEOUS EVERY MORNING
Refills: 0 | Status: DISCONTINUED | OUTPATIENT
Start: 2022-08-04 | End: 2022-08-05

## 2022-08-04 RX ORDER — DEXTROSE 50 % IN WATER 50 %
12.5 SYRINGE (ML) INTRAVENOUS ONCE
Refills: 0 | Status: DISCONTINUED | OUTPATIENT
Start: 2022-08-04 | End: 2022-08-06

## 2022-08-04 RX ADMIN — Medication 400 MILLIGRAM(S): at 21:52

## 2022-08-04 RX ADMIN — Medication 500 MICROGRAM(S): at 08:43

## 2022-08-04 RX ADMIN — OXYCODONE HYDROCHLORIDE 10 MILLIGRAM(S): 5 TABLET ORAL at 14:38

## 2022-08-04 RX ADMIN — Medication 400 MILLIGRAM(S): at 13:20

## 2022-08-04 RX ADMIN — OXYCODONE HYDROCHLORIDE 10 MILLIGRAM(S): 5 TABLET ORAL at 21:58

## 2022-08-04 RX ADMIN — Medication 500 MICROGRAM(S): at 14:11

## 2022-08-04 RX ADMIN — METFORMIN HYDROCHLORIDE 1000 MILLIGRAM(S): 850 TABLET ORAL at 17:22

## 2022-08-04 RX ADMIN — OXYCODONE HYDROCHLORIDE 10 MILLIGRAM(S): 5 TABLET ORAL at 13:29

## 2022-08-04 RX ADMIN — INSULIN HUMAN 1 UNIT(S)/HR: 100 INJECTION, SOLUTION SUBCUTANEOUS at 06:56

## 2022-08-04 RX ADMIN — INSULIN GLARGINE 30 UNIT(S): 100 INJECTION, SOLUTION SUBCUTANEOUS at 09:51

## 2022-08-04 RX ADMIN — Medication 5 UNIT(S): at 16:54

## 2022-08-04 RX ADMIN — Medication 600 MILLIGRAM(S): at 17:21

## 2022-08-04 RX ADMIN — Medication 60 MILLIGRAM(S): at 21:55

## 2022-08-04 RX ADMIN — Medication 60 MILLIGRAM(S): at 13:20

## 2022-08-04 RX ADMIN — POLYETHYLENE GLYCOL 3350 17 GRAM(S): 17 POWDER, FOR SOLUTION ORAL at 12:06

## 2022-08-04 RX ADMIN — Medication 600 MILLIGRAM(S): at 06:57

## 2022-08-04 RX ADMIN — FAMOTIDINE 20 MILLIGRAM(S): 10 INJECTION INTRAVENOUS at 06:57

## 2022-08-04 RX ADMIN — Medication 50 MILLIGRAM(S): at 06:58

## 2022-08-04 RX ADMIN — SODIUM CHLORIDE 20 MILLILITER(S): 9 INJECTION INTRAMUSCULAR; INTRAVENOUS; SUBCUTANEOUS at 06:56

## 2022-08-04 RX ADMIN — METFORMIN HYDROCHLORIDE 1000 MILLIGRAM(S): 850 TABLET ORAL at 06:56

## 2022-08-04 RX ADMIN — Medication 60 MILLIGRAM(S): at 06:57

## 2022-08-04 RX ADMIN — OXYCODONE HYDROCHLORIDE 10 MILLIGRAM(S): 5 TABLET ORAL at 07:27

## 2022-08-04 RX ADMIN — CHLORHEXIDINE GLUCONATE 1 APPLICATION(S): 213 SOLUTION TOPICAL at 06:29

## 2022-08-04 RX ADMIN — Medication 400 MILLIGRAM(S): at 07:27

## 2022-08-04 RX ADMIN — Medication 325 MILLIGRAM(S): at 12:06

## 2022-08-04 RX ADMIN — Medication 50 MILLIGRAM(S): at 14:34

## 2022-08-04 RX ADMIN — HEPARIN SODIUM 5000 UNIT(S): 5000 INJECTION INTRAVENOUS; SUBCUTANEOUS at 13:20

## 2022-08-04 RX ADMIN — Medication 5 UNIT(S): at 12:02

## 2022-08-04 RX ADMIN — HEPARIN SODIUM 5000 UNIT(S): 5000 INJECTION INTRAVENOUS; SUBCUTANEOUS at 06:59

## 2022-08-04 RX ADMIN — OXYCODONE HYDROCHLORIDE 10 MILLIGRAM(S): 5 TABLET ORAL at 06:45

## 2022-08-04 RX ADMIN — Medication 100 GRAM(S): at 06:56

## 2022-08-04 RX ADMIN — Medication 400 MILLIGRAM(S): at 06:57

## 2022-08-04 RX ADMIN — ATORVASTATIN CALCIUM 40 MILLIGRAM(S): 80 TABLET, FILM COATED ORAL at 21:52

## 2022-08-04 RX ADMIN — SENNA PLUS 1 TABLET(S): 8.6 TABLET ORAL at 06:57

## 2022-08-04 RX ADMIN — FAMOTIDINE 20 MILLIGRAM(S): 10 INJECTION INTRAVENOUS at 17:22

## 2022-08-04 RX ADMIN — Medication 100 GRAM(S): at 17:21

## 2022-08-04 RX ADMIN — Medication 50 MILLIGRAM(S): at 21:55

## 2022-08-04 RX ADMIN — Medication 400 MILLIGRAM(S): at 13:30

## 2022-08-04 RX ADMIN — SENNA PLUS 1 TABLET(S): 8.6 TABLET ORAL at 17:21

## 2022-08-04 RX ADMIN — Medication 4: at 12:01

## 2022-08-04 NOTE — PROGRESS NOTE ADULT - SUBJECTIVE AND OBJECTIVE BOX
OPERATIVE PROCEDURE(s):                POD #                       SURGEON(s): ASHLYN Galo  SUBJECTIVE ASSESSMENT:52yMale patient seen and examined at bedside.    Vital Signs Last 24 Hrs  T(F): 99 (04 Aug 2022 00:00), Max: 101.1 (03 Aug 2022 13:00)  HR: 76 (04 Aug 2022 05:00) (76 - 109)  BP: 100/65 (04 Aug 2022 05:00) (95/53 - 154/85)  BP(mean): 76 (04 Aug 2022 05:00) (68 - 110)  ABP: 101/51 (04 Aug 2022 05:00) (101/51 - 196/84)  ABP(mean): 64 (04 Aug 2022 05:00)  RR: 18 (04 Aug 2022 05:00) (16 - 34)  SpO2: 95% (04 Aug 2022 05:00) (90% - 98%)  CVP(mm Hg): --  CVP(cm H2O): --  CO: --  CI: --  PA: --  SVR: --    I&O's Detail    02 Aug 2022 07:01  -  03 Aug 2022 07:00  --------------------------------------------------------  IN:    Insulin: 88 mL    IV PiggyBack: 100 mL    Nitroglycerin: 350 mL    Oral Fluid: 2410 mL    sodium chloride 0.9%: 480 mL  Total IN: 3428 mL    OUT:    Chest Tube (mL): 20 mL    Chest Tube (mL): 2 mL    Indwelling Catheter - Urethral (mL): 2625 mL  Total OUT: 2647 mL        Net: I&O's Detail    01 Aug 2022 07:01  -  02 Aug 2022 07:00  --------------------------------------------------------  Total NET: 1051 mL      02 Aug 2022 07:01  -  03 Aug 2022 07:00  --------------------------------------------------------  Total NET: 781 mL        CAPILLARY BLOOD GLUCOSE      POCT Blood Glucose.: 108 mg/dL (04 Aug 2022 01:05)  POCT Blood Glucose.: 109 mg/dL (04 Aug 2022 00:00)  POCT Blood Glucose.: 112 mg/dL (03 Aug 2022 21:49)  POCT Blood Glucose.: 119 mg/dL (03 Aug 2022 20:34)  POCT Blood Glucose.: 153 mg/dL (03 Aug 2022 17:16)  POCT Blood Glucose.: 138 mg/dL (03 Aug 2022 15:31)  POCT Blood Glucose.: 126 mg/dL (03 Aug 2022 13:31)  POCT Blood Glucose.: 189 mg/dL (03 Aug 2022 11:49)  POCT Blood Glucose.: 287 mg/dL (03 Aug 2022 10:46)  POCT Blood Glucose.: 249 mg/dL (03 Aug 2022 08:16)    A1C with Estimated Average Glucose Result: 6.9 % (07-30-22 @ 11:01)      Physical Exam:  General: NAD; A&Ox3  Cardiac: S1/S2, RRR, no murmur, no rubs  Lungs: unlabored respirations, CTA b/l, no wheeze, no rales, no crackles  Abdomen: Soft/NT/ND; positive bowel sounds x 4  Sternum: Intact, no click, incision healing well with no drainage  Incisions: Incisions clean/dry/intact  Extremities: No edema b/l lower extremities; good capillary refill; no cyanosis; palpable 1+ pedal pulses b/l    Central Venous Catheter: Yes: critical illness, intravenous access  Day #  [place] Catheter: Yes: critical illness; monitor strict i/o's                    Day #  EPICARDIAL WIRES:  YES                                                                         Day #  BOWEL MOVEMENT:  [] YES [] NO, If No, Timing since last BM Day #  CHEST TUBE(MS/Left/Right):  [] YES [] NO, If yes -  AIR LEAKS:  YES/NO        LABS:                        9.9<L>  6.66  )-----------( 187      ( 04 Aug 2022 01:30 )             30.5<L>                        9.6<L>  5.07  )-----------( 130      ( 03 Aug 2022 05:05 )             30.2<L>    08-03    138  |  104  |  7<L>  ----------------------------<  131<H>  3.8   |  24  |  0.5<L>  08-02    140  |  105  |  7<L>  ----------------------------<  241<H>  3.8   |  24  |  0.6<L>    Ca    8.2<L>      03 Aug 2022 05:05  Mg     2.2     08-04    TPro  5.5<L> [6.0 - 8.0]  /  Alb  4.1 [3.5 - 5.2]  /  TBili  0.5 [0.2 - 1.2]  /  DBili  x   /  AST  32 [0 - 41]  /  ALT  42<H> [0 - 41]  /  AlkPhos  33 [30 - 115]  08-03        ABG - ( 04 Aug 2022 03:49 )  pH: 7.46  /  pCO2: 35    /  pO2: 65    / HCO3: 25    / Base Excess: 1.3   /  SaO2: 93.7  /  LA: 0.60             RADIOLOGY & ADDITIONAL TESTS:  CXR:   EKG:    Allergies    No Known Allergies    Intolerances      MEDICATIONS  (STANDING):  aspirin enteric coated 325 milliGRAM(s) Oral daily  atorvastatin 40 milliGRAM(s) Oral at bedtime  chlorhexidine 2% Cloths 1 Application(s) Topical <User Schedule>  dextrose 50% Injectable 50 milliLiter(s) IV Push every 15 minutes  dextrose 50% Injectable 25 milliLiter(s) IV Push every 15 minutes  diltiazem    Tablet 60 milliGRAM(s) Oral every 8 hours  famotidine    Tablet 20 milliGRAM(s) Oral two times a day  guaiFENesin  milliGRAM(s) Oral every 12 hours  heparin   Injectable 5000 Unit(s) SubCutaneous every 8 hours  ibuprofen  Tablet. 400 milliGRAM(s) Oral every 8 hours  insulin regular Infusion 1 Unit(s)/Hr (1 mL/Hr) IV Continuous <Continuous>  ipratropium    for Nebulization 500 MICROGram(s) Nebulizer every 6 hours  magnesium sulfate  IVPB 1 Gram(s) IV Intermittent every 12 hours  meperidine     Injectable 25 milliGRAM(s) IV Push once  metFORMIN 1000 milliGRAM(s) Oral every 12 hours  metoprolol tartrate 50 milliGRAM(s) Oral every 8 hours  nitroglycerin  Infusion 15 MICROgram(s)/Min (4.5 mL/Hr) IV Continuous <Continuous>  polyethylene glycol 3350 17 Gram(s) Oral daily  senna 1 Tablet(s) Oral every 12 hours  sitaGLIPtin 50 milliGRAM(s) Oral daily  sodium chloride 0.9%. 1000 milliLiter(s) (20 mL/Hr) IV Continuous <Continuous>    MEDICATIONS  (PRN):  oxyCODONE    IR 10 milliGRAM(s) Oral every 4 hours PRN Moderate Pain (4 - 6)  oxyCODONE    IR 5 milliGRAM(s) Oral every 4 hours PRN Mild Pain (1 - 3)    Home Medications:  Aspirin Low Dose 81 mg oral tablet, chewable: 1 tab(s) orally once a day (29 Jul 2022 07:14)  enalapril-hydrochlorothiazide 10 mg-25 mg oral tablet: 1 tab(s) orally once a day (29 Jul 2022 07:14)  gemfibrozil 600 mg oral tablet: 1 tab(s) orally 2 times a day (29 Jul 2022 07:14)  Janumet 50 mg-1000 mg oral tablet: 1 tab(s) orally 2 times a day (29 Jul 2022 07:14)  Metoprolol Succinate ER 25 mg oral tablet, extended release: 1 tab(s) orally once a day (29 Jul 2022 07:14)  simvastatin 40 mg oral tablet: 1 tab(s) orally once a day (at bedtime) (29 Jul 2022 07:14)      Pharmacologic DVT Prophylaxis [] YES, [] NO: Contraindication:  SCD's: YES b/l    GI Prophylaxis:     Post-Op Beta-Blockers: [] Yes, [] No: contraindication:  Post-Op CCB: [] Yes, [] No: contraindication:  Post-Op Aspirin:  [] Yes, [] No: contraindication:  Post-Op Statin:  [] Yes, [] No: contraindication:    Ambulation/Activity Status:    Assessment/Plan:  52y Male status-post  - Case and plan discussed with CTU Intensivist and CT Surgeon - Dr. Galo/Jessica/Matthew/Serena  - Continue CTU supportive care and ongoing plan of care as per continuing CTU rounds.   - Continue DVT/GI prophylaxis  - Incentive Spirometry 10 times an hour  - Continue to advance physical activity as tolerated and continue PT/OT as directed  1. CAD s/p CABG: Continue ASA, statin, BB  2. HTN:   3. Post-op A. Fib ppx:   4. COPD/Hypoxia:   5. DM/Glucose Control:     Social Service Disposition:     OPERATIVE PROCEDURE(s):     CABG           POD #3                       SURGEON(s): ASHLYN Galo  SUBJECTIVE ASSESSMENT:52yMale patient seen and examined at bedside.    Vital Signs Last 24 Hrs  T(F): 99 (04 Aug 2022 00:00), Max: 101.1 (03 Aug 2022 13:00)  HR: 76 (04 Aug 2022 05:00) (76 - 109)  BP: 100/65 (04 Aug 2022 05:00) (95/53 - 154/85)  BP(mean): 76 (04 Aug 2022 05:00) (68 - 110)  ABP: 101/51 (04 Aug 2022 05:00) (101/51 - 196/84)  ABP(mean): 64 (04 Aug 2022 05:00)  RR: 18 (04 Aug 2022 05:00) (16 - 34)  SpO2: 95% (04 Aug 2022 05:00) (90% - 98%)    I&O's Detail    02 Aug 2022 07:01  -  03 Aug 2022 07:00  --------------------------------------------------------  IN:    Insulin: 88 mL    IV PiggyBack: 100 mL    Nitroglycerin: 350 mL    Oral Fluid: 2410 mL    sodium chloride 0.9%: 480 mL  Total IN: 3428 mL    OUT:    Chest Tube (mL): 20 mL    Chest Tube (mL): 2 mL    Indwelling Catheter - Urethral (mL): 2625 mL  Total OUT: 2647 mL        Net: I&O's Detail    01 Aug 2022 07:01  -  02 Aug 2022 07:00  --------------------------------------------------------  Total NET: 1051 mL      02 Aug 2022 07:01  -  03 Aug 2022 07:00  --------------------------------------------------------  Total NET: 781 mL        CAPILLARY BLOOD GLUCOSE  POCT Blood Glucose.: 108 mg/dL (04 Aug 2022 01:05)  POCT Blood Glucose.: 109 mg/dL (04 Aug 2022 00:00)  POCT Blood Glucose.: 112 mg/dL (03 Aug 2022 21:49)  POCT Blood Glucose.: 119 mg/dL (03 Aug 2022 20:34)  POCT Blood Glucose.: 153 mg/dL (03 Aug 2022 17:16)  POCT Blood Glucose.: 138 mg/dL (03 Aug 2022 15:31)  POCT Blood Glucose.: 126 mg/dL (03 Aug 2022 13:31)  POCT Blood Glucose.: 189 mg/dL (03 Aug 2022 11:49)  POCT Blood Glucose.: 287 mg/dL (03 Aug 2022 10:46)  POCT Blood Glucose.: 249 mg/dL (03 Aug 2022 08:16)    A1C with Estimated Average Glucose Result: 6.9 % (07-30-22 @ 11:01)      Physical Exam:  General: NAD; A&Ox3  Cardiac: S1/S2, RRR, no murmur, no rubs  Lungs: unlabored respirations, CTA b/l, no wheeze, no rales, no crackles  Abdomen: Soft/NT/ND; positive bowel sounds x 4  Sternum: Intact, no click, incision healing well with no drainage  Incisions: Incisions clean/dry/intact  Extremities: No edema b/l lower extremities; good capillary refill; no cyanosis; palpable 1+ pedal pulses b/l        LABS:                        9.9<L>  6.66  )-----------( 187      ( 04 Aug 2022 01:30 )             30.5<L>                        9.6<L>  5.07  )-----------( 130      ( 03 Aug 2022 05:05 )             30.2<L>    08-03    138  |  104  |  7<L>  ----------------------------<  131<H>  3.8   |  24  |  0.5<L>  08-02    140  |  105  |  7<L>  ----------------------------<  241<H>  3.8   |  24  |  0.6<L>    Ca    8.2<L>      03 Aug 2022 05:05  Mg     2.2     08-04    TPro  5.5<L> [6.0 - 8.0]  /  Alb  4.1 [3.5 - 5.2]  /  TBili  0.5 [0.2 - 1.2]  /  DBili  x   /  AST  32 [0 - 41]  /  ALT  42<H> [0 - 41]  /  AlkPhos  33 [30 - 115]  08-03        ABG - ( 04 Aug 2022 03:49 )  pH: 7.46  /  pCO2: 35    /  pO2: 65    / HCO3: 25    / Base Excess: 1.3   /  SaO2: 93.7  /  LA: 0.60       Allergies    No Known Allergies      MEDICATIONS  (STANDING):  aspirin enteric coated 325 milliGRAM(s) Oral daily  atorvastatin 40 milliGRAM(s) Oral at bedtime  chlorhexidine 2% Cloths 1 Application(s) Topical <User Schedule>  dextrose 50% Injectable 50 milliLiter(s) IV Push every 15 minutes  dextrose 50% Injectable 25 milliLiter(s) IV Push every 15 minutes  diltiazem    Tablet 60 milliGRAM(s) Oral every 8 hours  famotidine    Tablet 20 milliGRAM(s) Oral two times a day  guaiFENesin  milliGRAM(s) Oral every 12 hours  heparin   Injectable 5000 Unit(s) SubCutaneous every 8 hours  ibuprofen  Tablet. 400 milliGRAM(s) Oral every 8 hours  insulin regular Infusion 1 Unit(s)/Hr (1 mL/Hr) IV Continuous <Continuous>  ipratropium    for Nebulization 500 MICROGram(s) Nebulizer every 6 hours  magnesium sulfate  IVPB 1 Gram(s) IV Intermittent every 12 hours  meperidine     Injectable 25 milliGRAM(s) IV Push once  metFORMIN 1000 milliGRAM(s) Oral every 12 hours  metoprolol tartrate 50 milliGRAM(s) Oral every 8 hours  nitroglycerin  Infusion 15 MICROgram(s)/Min (4.5 mL/Hr) IV Continuous <Continuous>  polyethylene glycol 3350 17 Gram(s) Oral daily  senna 1 Tablet(s) Oral every 12 hours  sitaGLIPtin 50 milliGRAM(s) Oral daily  sodium chloride 0.9%. 1000 milliLiter(s) (20 mL/Hr) IV Continuous <Continuous>    MEDICATIONS  (PRN):  oxyCODONE    IR 10 milliGRAM(s) Oral every 4 hours PRN Moderate Pain (4 - 6)  oxyCODONE    IR 5 milliGRAM(s) Oral every 4 hours PRN Mild Pain (1 - 3)    Home Medications:  Aspirin Low Dose 81 mg oral tablet, chewable: 1 tab(s) orally once a day (29 Jul 2022 07:14)  enalapril-hydrochlorothiazide 10 mg-25 mg oral tablet: 1 tab(s) orally once a day (29 Jul 2022 07:14)  gemfibrozil 600 mg oral tablet: 1 tab(s) orally 2 times a day (29 Jul 2022 07:14)  Janumet 50 mg-1000 mg oral tablet: 1 tab(s) orally 2 times a day (29 Jul 2022 07:14)  Metoprolol Succinate ER 25 mg oral tablet, extended release: 1 tab(s) orally once a day (29 Jul 2022 07:14)  simvastatin 40 mg oral tablet: 1 tab(s) orally once a day (at bedtime) (29 Jul 2022 07:14)      Assessment/Plan:  52y Male status-post CABG  - Case and plan discussed with CTU Intensivist and CT Surgeon - Dr. Galo/Jessica/Matthew/Serena  - Continue CTU supportive care and ongoing plan of care as per continuing CTU rounds.   - Continue DVT/GI prophylaxis  - Incentive Spirometry 10 times an hour  - Continue to advance physical activity as tolerated and continue PT/OT as directed  1. CAD s/p CABG: Continue ASA, statin, BB  2. Lantus 30u daily and Humalog 5u with meals  3. Hold AM heparin tomorrow for pacing wire removal

## 2022-08-04 NOTE — PROGRESS NOTE ADULT - SUBJECTIVE AND OBJECTIVE BOX
SUBJ: Patient seen and examined. No events overnight.       MEDICATIONS  (STANDING):  aspirin enteric coated 325 milliGRAM(s) Oral daily  atorvastatin 40 milliGRAM(s) Oral at bedtime  chlorhexidine 2% Cloths 1 Application(s) Topical <User Schedule>  dextrose 5%. 1000 milliLiter(s) (50 mL/Hr) IV Continuous <Continuous>  dextrose 5%. 1000 milliLiter(s) (100 mL/Hr) IV Continuous <Continuous>  dextrose 50% Injectable 25 Gram(s) IV Push once  dextrose 50% Injectable 12.5 Gram(s) IV Push once  dextrose 50% Injectable 25 Gram(s) IV Push once  diltiazem    Tablet 60 milliGRAM(s) Oral every 8 hours  famotidine    Tablet 20 milliGRAM(s) Oral two times a day  glucagon  Injectable 1 milliGRAM(s) IntraMuscular once  guaiFENesin  milliGRAM(s) Oral every 12 hours  heparin   Injectable 5000 Unit(s) SubCutaneous every 8 hours  ibuprofen  Tablet. 400 milliGRAM(s) Oral every 8 hours  insulin glargine Injectable (LANTUS) 30 Unit(s) SubCutaneous every morning  insulin lispro (ADMELOG) corrective regimen sliding scale   SubCutaneous three times a day before meals  insulin lispro Injectable (ADMELOG) 5 Unit(s) SubCutaneous three times a day before meals  ipratropium    for Nebulization 500 MICROGram(s) Nebulizer every 6 hours  magnesium sulfate  IVPB 1 Gram(s) IV Intermittent every 12 hours  metFORMIN 1000 milliGRAM(s) Oral every 12 hours  metoprolol tartrate 50 milliGRAM(s) Oral every 8 hours  polyethylene glycol 3350 17 Gram(s) Oral daily  senna 1 Tablet(s) Oral every 12 hours  sitaGLIPtin 50 milliGRAM(s) Oral daily    MEDICATIONS  (PRN):  dextrose Oral Gel 15 Gram(s) Oral once PRN Blood Glucose LESS THAN 70 milliGRAM(s)/deciliter  oxyCODONE    IR 10 milliGRAM(s) Oral every 4 hours PRN Moderate Pain (4 - 6)  oxyCODONE    IR 5 milliGRAM(s) Oral every 4 hours PRN Mild Pain (1 - 3)            Vital Signs Last 24 Hrs  T(C): 36.3 (04 Aug 2022 19:00), Max: 37.2 (04 Aug 2022 00:00)  T(F): 97.3 (04 Aug 2022 19:00), Max: 99 (04 Aug 2022 00:00)  HR: 82 (04 Aug 2022 20:00) (76 - 108)  BP: 117/74 (04 Aug 2022 20:00) (90/55 - 154/85)  BP(mean): 91 (04 Aug 2022 20:00) (67 - 110)  RR: 24 (04 Aug 2022 20:00) (16 - 30)  SpO2: 96% (04 Aug 2022 20:00) (89% - 97%)    Parameters below as of 04 Aug 2022 20:00  Patient On (Oxygen Delivery Method): room air         REVIEW OF SYSTEMS:  CONSTITUTIONAL: No fever  NECK: No pain or stiffness  CARDIOVASCULAR: patient denies chest pain, shortness of breath or palpitations .  Repiratory: No cough or wheezing.  NEUROLOGICAL: No focal deficits to report.  GI: no BRBPR, no N,V,diarrhea.    PSYCHIATRY: normal mood and affect  HEENT: no nasal discharge, no ecchymosis  SKIN: no ecchymosis, no breakdown  MUSCULOSKELETAL: Full range of motion x4.      PHYSICAL EXAM:  GENERAL: NAD  HEAD:  Atraumatic, Normocephalic  NECK: Supple, No JVD  NERVOUS SYSTEM:  Alert & Oriented X3, Good concentration  CHEST/LUNG: Clear to anterior auscultation bilaterally  HEART: Regular rate and rhythm; No murmurs  ABDOMEN: Soft, Nontender, Nondistended;   EXTREMITIES:  No  edema  SKIN: No rashes or lesions    	  TELEMETRY:      LABS:                        9.9    6.66  )-----------( 187      ( 04 Aug 2022 01:30 )             30.5     08-03    138  |  104  |  7<L>  ----------------------------<  131<H>  3.8   |  24  |  0.5<L>    Ca    8.2<L>      03 Aug 2022 05:05  Mg     2.2     08-04    TPro  5.5<L>  /  Alb  4.1  /  TBili  0.5  /  DBili  x   /  AST  32  /  ALT  42<H>  /  AlkPhos  33  08-03            I&O's Summary    03 Aug 2022 07:01  -  04 Aug 2022 07:00  --------------------------------------------------------  IN: 2018 mL / OUT: 2535 mL / NET: -517 mL    04 Aug 2022 07:01  -  04 Aug 2022 21:01  --------------------------------------------------------  IN: 740 mL / OUT: 1100 mL / NET: -360 mL      BNP  RADIOLOGY & ADDITIONAL STUDIES:    IMPRESSION AND PLAN:  3V CAD S/P CABG  HTN  Poorly controlled sugar   - Management as per primary team  - Continue ASA, metoprolol and Statin   - Will follow as needed

## 2022-08-04 NOTE — PROGRESS NOTE ADULT - ASSESSMENT
Assessment/Plan:  CAD-s/p CABG x 4-POD #3  1-BP control-metoprolol, diltiazem  2-serum glucose control-insulin infusion, start Januvia, Metoprolol  3-fluid overload-diuresis  4-acute blood loss anemia-stable, monitor Hb/Hct daily  4-rllbwxftjsh-fzhqqbz potassium  2-autpcqsptsqjdvs-vhbwaoziv, hold statin, monitor daily Assessment/Plan:  CAD-s/p CABG x 4-POD #3  1-BP control-metoprolol, diltiazem  2-serum glucose control-insulin infusion, start Januvia, Metoprolol  3-fluid overload-diuresis  4-acute blood loss anemia-stable, monitor Hb/Hct daily  8-aartfuvnoyu-dkhdytt potassium  1-kmxtbhpmecuraoo-zppqfcyica start  statin

## 2022-08-04 NOTE — PROGRESS NOTE ADULT - SUBJECTIVE AND OBJECTIVE BOX
CTU Attending Progress Daily Note     04 Aug 2022 14:27  POD# - 3  He has history of HTN (hypertension)    Diabetes    Bladder tumor    Hypercholesterolemia    Lung nodules    Hepatic steatosis    Erectile dysfunction    2019 novel coronavirus disease (COVID-19)      Interval event for past 24 hr:  MINE ENRIQUEZ  52y had no event.   Current Complains:  MINE ENRIQUEZ has no new complains  HPI:  51 y/o M PMH:  bladder cancer, DM, ED, HTN, HLD, Covid 1/21, ex-smoker, + FH MI                PSH: bladder tumor resection, umbilical hernia repair    Pt reports SOB and CP with exertion , unable to walk few blocks d/t chest pain, sometimes has L arm pain, pt had submaximal stress test, LHC recommended for exertional chest pain increasing in frequency        Vital Signs Last 24 Hrs  T(C): --  T(F): --  HR: --  BP --107/70  BP(mean): --72  RR: --  SpO2: --        Pre cath note:  indication:  [ ] STEMI                [ ] NSTEMI                 [ ] Acute coronary syndrome                   [ ]Unstable Angina   [ ] high risk  [ ] intermediate risk  [ ] low risk                   [ x Stable Angina     non-invasive testing:                          Date:                     result: [ ] high risk  [  intermediate risk  [ ] low risk    Anti- Anginal medications:                    [ ] not used                       [ x used                   [ ] not used but strong indication not to use    Ejection Fraction                   [ ] <29            [ ] 30-39%   [ ] 40-49%     [ ]>50%    CHF                   [ ] active (within last 14 days on meds   [ ] Chronic (on meds but no exacerbation)    COPD                   [ ] mild (on chronic bronchodilators)  [ ] moderate (on chronic steroid therapy)      [ ] severe (indication for home O2 or PACO2 >50)    Other risk factors:                     [ ] Previous MI                     [ ] CVA/ stroke                    [ ] carotid stent/ CEA                    [ ] PVD/PAD- (arterial aneurysm, non-palpable pulses, tortuous vessel with inability to insert catheter, infra-renal dissection, renal or subclavian artery stenosis)                    [x] diabetic                    [ ] previous CABG                    [ ] Renal Failure     Bleeding Risk: 0.8%    REVIEW OF SYSTEMS:  CONSTITUTIONAL: No fever, weight loss, or fatigue  CARDIOLOGY: + chest painshortness of breath with exertion  RESPIRATORY: denies shortness of breath, wheezing  NEUROLOGICAL: NO weakness, no focal deficits to report.  ENDOCRINOLOGICAL: no recent change in diabetic medications.   GI: no BRBPR, no N,V,diarrhea.     PHYSICAL EXAM:  · CONSTITUTIONAL:	Well-developed, well nourished    ·RESPIRATORY:   airway patent; breath sounds equal; good air movement; respirations non-labored; clear to auscultation bilaterally; no chest wall tenderness; no intercostal retractions; no rales,rhonchi or wheeze  · CARDIOVASCULAR	regular rate and rhythm  no rub  no murmur  normal PMI  · EXTREMITIES: No cyanosis, clubbing or edema  · VASCULAR: 	Equal and normal pulses (carotid, femoral, dorsalis pedis)  	        EKG: SR  EF: Not available     (29 Jul 2022 07:16)    OBJECTIVE:  ICU Vital Signs Last 24 Hrs  T(C): 36.2 (04 Aug 2022 06:00), Max: 38.2 (03 Aug 2022 16:00)  T(F): 97.2 (04 Aug 2022 06:00), Max: 100.8 (03 Aug 2022 16:00)  HR: 97 (04 Aug 2022 12:00) (76 - 99)  BP: 119/65 (04 Aug 2022 12:00) (90/55 - 154/85)  BP(mean): 84 (04 Aug 2022 12:00) (67 - 110)  ABP: 108/65 (04 Aug 2022 09:00) (101/51 - 186/85)  ABP(mean): 81 (04 Aug 2022 09:00) (64 - 113)  RR: 25 (04 Aug 2022 12:00) (16 - 30)  SpO2: 92% (04 Aug 2022 12:00) (89% - 97%)    O2 Parameters below as of 04 Aug 2022 12:00  Patient On (Oxygen Delivery Method): room air          I&O's Summary    03 Aug 2022 07:01  -  04 Aug 2022 07:00  --------------------------------------------------------  IN: 2018 mL / OUT: 2535 mL / NET: -517 mL    04 Aug 2022 07:01  -  04 Aug 2022 14:27  --------------------------------------------------------  IN: 260 mL / OUT: 400 mL / NET: -140 mL      I&O's Detail    03 Aug 2022 07:01  -  04 Aug 2022 07:00  --------------------------------------------------------  IN:    Insulin: 88 mL    IV PiggyBack: 300 mL    Oral Fluid: 1170 mL    sodium chloride 0.9%: 460 mL  Total IN: 2018 mL    OUT:    Indwelling Catheter - Urethral (mL): 1560 mL    Voided (mL): 975 mL  Total OUT: 2535 mL    Total NET: -517 mL      04 Aug 2022 07:01  -  04 Aug 2022 14:27  --------------------------------------------------------  IN:    Oral Fluid: 240 mL    sodium chloride 0.9%: 20 mL  Total IN: 260 mL    OUT:    Insulin: 0 mL    Voided (mL): 400 mL  Total OUT: 400 mL    Total NET: -140 mL        Adult Advanced Hemodynamics Last 24 Hrs  CVP(mm Hg): --  CVP(cm H2O): --  CO: --  CI: --  PA: --  PA(mean): --  PCWP: --  SVR: --  SVRI: --  PVR: --  PVRI: --    CAPILLARY BLOOD GLUCOSE      POCT Blood Glucose.: 226 mg/dL (04 Aug 2022 11:48)  POCT Blood Glucose.: 95 mg/dL (04 Aug 2022 07:04)  POCT Blood Glucose.: 108 mg/dL (04 Aug 2022 01:05)  POCT Blood Glucose.: 109 mg/dL (04 Aug 2022 00:00)  POCT Blood Glucose.: 112 mg/dL (03 Aug 2022 21:49)  POCT Blood Glucose.: 119 mg/dL (03 Aug 2022 20:34)  POCT Blood Glucose.: 153 mg/dL (03 Aug 2022 17:16)  POCT Blood Glucose.: 138 mg/dL (03 Aug 2022 15:31)    LABS:  ABG - ( 04 Aug 2022 03:49 )  pH, Arterial: 7.46  pH, Blood: x     /  pCO2: 35    /  pO2: 65    / HCO3: 25    / Base Excess: 1.3   /  SaO2: 93.7                                    9.9    6.66  )-----------( 187      ( 04 Aug 2022 01:30 )             30.5     08-03    138  |  104  |  7<L>  ----------------------------<  131<H>  3.8   |  24  |  0.5<L>    Ca    8.2<L>      03 Aug 2022 05:05  Mg     2.2     08-04    TPro  5.5<L>  /  Alb  4.1  /  TBili  0.5  /  DBili  x   /  AST  32  /  ALT  42<H>  /  AlkPhos  33  08-03          Home Medications:  Aspirin Low Dose 81 mg oral tablet, chewable: 1 tab(s) orally once a day (29 Jul 2022 07:14)  enalapril-hydrochlorothiazide 10 mg-25 mg oral tablet: 1 tab(s) orally once a day (29 Jul 2022 07:14)  gemfibrozil 600 mg oral tablet: 1 tab(s) orally 2 times a day (29 Jul 2022 07:14)  Janumet 50 mg-1000 mg oral tablet: 1 tab(s) orally 2 times a day (29 Jul 2022 07:14)  Metoprolol Succinate ER 25 mg oral tablet, extended release: 1 tab(s) orally once a day (29 Jul 2022 07:14)  simvastatin 40 mg oral tablet: 1 tab(s) orally once a day (at bedtime) (29 Jul 2022 07:14)    HOSPITAL MEDICATIONS:  MEDICATIONS  (STANDING):  aspirin enteric coated 325 milliGRAM(s) Oral daily  atorvastatin 40 milliGRAM(s) Oral at bedtime  chlorhexidine 2% Cloths 1 Application(s) Topical <User Schedule>  dextrose 5%. 1000 milliLiter(s) (50 mL/Hr) IV Continuous <Continuous>  dextrose 5%. 1000 milliLiter(s) (100 mL/Hr) IV Continuous <Continuous>  dextrose 50% Injectable 25 Gram(s) IV Push once  dextrose 50% Injectable 25 Gram(s) IV Push once  dextrose 50% Injectable 12.5 Gram(s) IV Push once  diltiazem    Tablet 60 milliGRAM(s) Oral every 8 hours  famotidine    Tablet 20 milliGRAM(s) Oral two times a day  glucagon  Injectable 1 milliGRAM(s) IntraMuscular once  guaiFENesin  milliGRAM(s) Oral every 12 hours  heparin   Injectable 5000 Unit(s) SubCutaneous every 8 hours  ibuprofen  Tablet. 400 milliGRAM(s) Oral every 8 hours  insulin glargine Injectable (LANTUS) 30 Unit(s) SubCutaneous every morning  insulin lispro (ADMELOG) corrective regimen sliding scale   SubCutaneous three times a day before meals  insulin lispro Injectable (ADMELOG) 5 Unit(s) SubCutaneous three times a day before meals  ipratropium    for Nebulization 500 MICROGram(s) Nebulizer every 6 hours  magnesium sulfate  IVPB 1 Gram(s) IV Intermittent every 12 hours  metFORMIN 1000 milliGRAM(s) Oral every 12 hours  metoprolol tartrate 50 milliGRAM(s) Oral every 8 hours  polyethylene glycol 3350 17 Gram(s) Oral daily  senna 1 Tablet(s) Oral every 12 hours  sitaGLIPtin 50 milliGRAM(s) Oral daily    MEDICATIONS  (PRN):  dextrose Oral Gel 15 Gram(s) Oral once PRN Blood Glucose LESS THAN 70 milliGRAM(s)/deciliter  oxyCODONE    IR 10 milliGRAM(s) Oral every 4 hours PRN Moderate Pain (4 - 6)  oxyCODONE    IR 5 milliGRAM(s) Oral every 4 hours PRN Mild Pain (1 - 3)      REVIEW OF SYSTEMS:  CONSTITUTIONAL: [X] all negative; [ ] weakness, [ ] fevers, [ ] chills  EYES/ENT: [X] all negative; [ ] visual changes, [ ] vertigo, [ ] throat pain   NECK: [X] all negative; [ ] pain, [ ] stiffness  RESPIRATORY: [] all negative, [ ] cough, [ ] wheezing, [ ] hemoptysis, [ ] shortness of breath  CARDIOVASCULAR: [] all negative; [ ] chest pain, [ ] palpitations, [ ] orthopnea  GASTROINTESTINAL: [X] all negative; [ ]abdominal pain, [ ] nausea, [ ] vomiting, [ ] hematemesis, [ ] diarrhea, [ ] constipation, [ ] melena, [ ] hematochezia.  GENITOURINARY: [X] all negative; [ ] dysuria, [ ] frequency, [ ] hematuria  NEUROLOGICAL: [X] all negative; [ ] numbness, [ ] weakness  SKIN: [X] all negative; [ ] itching, [ ] burning, [ ] rashes, [ ] lesions   All other review of systems is negative unless indicated above.    [  ] Unable to assess ROS because     PHYSICAL EXAM:          CONSTITUTIONAL: Well-developed; well-nourished; in no acute distress.   	SKIN: warm, dry  	HEAD: Normocephalic; atraumatic.  	EYES: PERRL, EOM, no conj injection, sclera clear  	ENT: No nasal discharge; airway clear.  	NECK: Supple; non tender.  No midline ttp ctls  	CARD: S1, S2 normal; no murmurs, gallops, or rubs. Regular rate and rhythm. 2+ RPs and DPs bilat, no carotid bruits, no pedal   edema, no calf pain b/l  	RESP: CTA  bilat good air movement No wheezes, rales or rhonchi.  	ABD: Soft, not tender, not distended, no CVA ttp no rebound or guarding, bowel sounds present  	EXT: Normal ROM.  No clubbing, cyanosis or edema.   	  	NEURO: Alert, awake, motor 5/5 R, 5/5 L        RADIOLOGY:  xray  < from: Xray Chest 1 View- PORTABLE-Routine (08.04.22 @ 06:46) >    Impression:    Low lung volumes with diminishing left basilar opacity. No pneumothorax    --- End of Report ---    < end of copied text >    I spent 45 minutes of critical care time ; more than 50% of visit was spent counseling and/or examining patient, reviewing vitals, labs, medications, imaging and discussing with the team goals of care to prevent life-threatening in this patient who is at high risk for deterioration or death due to:

## 2022-08-05 ENCOUNTER — TRANSCRIPTION ENCOUNTER (OUTPATIENT)
Age: 52
End: 2022-08-05

## 2022-08-05 VITALS
OXYGEN SATURATION: 95 % | SYSTOLIC BLOOD PRESSURE: 147 MMHG | TEMPERATURE: 98 F | RESPIRATION RATE: 26 BRPM | HEART RATE: 102 BPM | DIASTOLIC BLOOD PRESSURE: 77 MMHG

## 2022-08-05 DIAGNOSIS — F05 DELIRIUM DUE TO KNOWN PHYSIOLOGICAL CONDITION: ICD-10-CM

## 2022-08-05 LAB
ANION GAP SERPL CALC-SCNC: 14 MMOL/L — SIGNIFICANT CHANGE UP (ref 7–14)
BASOPHILS # BLD AUTO: 0.04 K/UL — SIGNIFICANT CHANGE UP (ref 0–0.2)
BASOPHILS NFR BLD AUTO: 0.7 % — SIGNIFICANT CHANGE UP (ref 0–1)
BUN SERPL-MCNC: 12 MG/DL — SIGNIFICANT CHANGE UP (ref 10–20)
CALCIUM SERPL-MCNC: 8.5 MG/DL — SIGNIFICANT CHANGE UP (ref 8.5–10.1)
CHLORIDE SERPL-SCNC: 106 MMOL/L — SIGNIFICANT CHANGE UP (ref 98–110)
CO2 SERPL-SCNC: 23 MMOL/L — SIGNIFICANT CHANGE UP (ref 17–32)
CREAT SERPL-MCNC: 0.5 MG/DL — LOW (ref 0.7–1.5)
EGFR: 123 ML/MIN/1.73M2 — SIGNIFICANT CHANGE UP
EOSINOPHIL # BLD AUTO: 0.59 K/UL — SIGNIFICANT CHANGE UP (ref 0–0.7)
EOSINOPHIL NFR BLD AUTO: 9.8 % — HIGH (ref 0–8)
GLUCOSE BLDC GLUCOMTR-MCNC: 132 MG/DL — HIGH (ref 70–99)
GLUCOSE BLDC GLUCOMTR-MCNC: 197 MG/DL — HIGH (ref 70–99)
GLUCOSE SERPL-MCNC: 161 MG/DL — HIGH (ref 70–99)
HCT VFR BLD CALC: 29.4 % — LOW (ref 42–52)
HGB BLD-MCNC: 9.4 G/DL — LOW (ref 14–18)
IMM GRANULOCYTES NFR BLD AUTO: 0.5 % — HIGH (ref 0.1–0.3)
LYMPHOCYTES # BLD AUTO: 1.4 K/UL — SIGNIFICANT CHANGE UP (ref 1.2–3.4)
LYMPHOCYTES # BLD AUTO: 23.3 % — SIGNIFICANT CHANGE UP (ref 20.5–51.1)
MAGNESIUM SERPL-MCNC: 1.9 MG/DL — SIGNIFICANT CHANGE UP (ref 1.8–2.4)
MCHC RBC-ENTMCNC: 26 PG — LOW (ref 27–31)
MCHC RBC-ENTMCNC: 32 G/DL — SIGNIFICANT CHANGE UP (ref 32–37)
MCV RBC AUTO: 81.4 FL — SIGNIFICANT CHANGE UP (ref 80–94)
MONOCYTES # BLD AUTO: 0.72 K/UL — HIGH (ref 0.1–0.6)
MONOCYTES NFR BLD AUTO: 12 % — HIGH (ref 1.7–9.3)
NEUTROPHILS # BLD AUTO: 3.23 K/UL — SIGNIFICANT CHANGE UP (ref 1.4–6.5)
NEUTROPHILS NFR BLD AUTO: 53.7 % — SIGNIFICANT CHANGE UP (ref 42.2–75.2)
NRBC # BLD: 0 /100 WBCS — SIGNIFICANT CHANGE UP (ref 0–0)
PLATELET # BLD AUTO: 205 K/UL — SIGNIFICANT CHANGE UP (ref 130–400)
POTASSIUM SERPL-MCNC: 4.4 MMOL/L — SIGNIFICANT CHANGE UP (ref 3.5–5)
POTASSIUM SERPL-SCNC: 4.4 MMOL/L — SIGNIFICANT CHANGE UP (ref 3.5–5)
RBC # BLD: 3.61 M/UL — LOW (ref 4.7–6.1)
RBC # FLD: 15.8 % — HIGH (ref 11.5–14.5)
SODIUM SERPL-SCNC: 143 MMOL/L — SIGNIFICANT CHANGE UP (ref 135–146)
WBC # BLD: 6.01 K/UL — SIGNIFICANT CHANGE UP (ref 4.8–10.8)
WBC # FLD AUTO: 6.01 K/UL — SIGNIFICANT CHANGE UP (ref 4.8–10.8)

## 2022-08-05 PROCEDURE — 71045 X-RAY EXAM CHEST 1 VIEW: CPT | Mod: 26

## 2022-08-05 PROCEDURE — 71046 X-RAY EXAM CHEST 2 VIEWS: CPT | Mod: 26

## 2022-08-05 PROCEDURE — 93010 ELECTROCARDIOGRAM REPORT: CPT

## 2022-08-05 PROCEDURE — 99221 1ST HOSP IP/OBS SF/LOW 40: CPT

## 2022-08-05 PROCEDURE — 99233 SBSQ HOSP IP/OBS HIGH 50: CPT

## 2022-08-05 RX ORDER — SIMVASTATIN 20 MG/1
1 TABLET, FILM COATED ORAL
Qty: 0 | Refills: 0 | DISCHARGE

## 2022-08-05 RX ORDER — SITAGLIPTIN 50 MG/1
1 TABLET, FILM COATED ORAL
Qty: 0 | Refills: 0 | DISCHARGE
Start: 2022-08-05

## 2022-08-05 RX ORDER — ENALAPRIL MALEATE AND HYDROCHLOROTHIAZIDE 10; 25 MG/1; MG/1
1 TABLET ORAL
Qty: 0 | Refills: 0 | DISCHARGE

## 2022-08-05 RX ORDER — FAMOTIDINE 10 MG/ML
1 INJECTION INTRAVENOUS
Qty: 60 | Refills: 0
Start: 2022-08-05 | End: 2022-09-03

## 2022-08-05 RX ORDER — METOPROLOL TARTRATE 50 MG
1 TABLET ORAL
Qty: 0 | Refills: 0 | DISCHARGE

## 2022-08-05 RX ORDER — DILTIAZEM HCL 120 MG
1 CAPSULE, EXT RELEASE 24 HR ORAL
Qty: 30 | Refills: 0
Start: 2022-08-05 | End: 2022-09-03

## 2022-08-05 RX ORDER — SIMVASTATIN 20 MG/1
1 TABLET, FILM COATED ORAL
Qty: 30 | Refills: 0
Start: 2022-08-05 | End: 2022-09-03

## 2022-08-05 RX ORDER — ATORVASTATIN CALCIUM 80 MG/1
1 TABLET, FILM COATED ORAL
Qty: 30 | Refills: 0
Start: 2022-08-05 | End: 2022-09-03

## 2022-08-05 RX ORDER — ASPIRIN/CALCIUM CARB/MAGNESIUM 324 MG
1 TABLET ORAL
Qty: 0 | Refills: 0 | DISCHARGE
Start: 2022-08-05

## 2022-08-05 RX ORDER — METOPROLOL TARTRATE 50 MG
50 TABLET ORAL EVERY 12 HOURS
Refills: 0 | Status: DISCONTINUED | OUTPATIENT
Start: 2022-08-05 | End: 2022-08-06

## 2022-08-05 RX ORDER — ASPIRIN/CALCIUM CARB/MAGNESIUM 324 MG
1 TABLET ORAL
Qty: 0 | Refills: 0 | DISCHARGE

## 2022-08-05 RX ORDER — METFORMIN HYDROCHLORIDE 850 MG/1
1 TABLET ORAL
Qty: 0 | Refills: 0 | DISCHARGE
Start: 2022-08-05

## 2022-08-05 RX ORDER — IBUPROFEN 200 MG
1 TABLET ORAL
Qty: 0 | Refills: 0 | DISCHARGE
Start: 2022-08-05

## 2022-08-05 RX ORDER — SENNA PLUS 8.6 MG/1
1 TABLET ORAL
Qty: 60 | Refills: 0
Start: 2022-08-05 | End: 2022-09-03

## 2022-08-05 RX ORDER — DILTIAZEM HCL 120 MG
120 CAPSULE, EXT RELEASE 24 HR ORAL DAILY
Refills: 0 | Status: DISCONTINUED | OUTPATIENT
Start: 2022-08-06 | End: 2022-08-06

## 2022-08-05 RX ORDER — METOPROLOL TARTRATE 50 MG
1 TABLET ORAL
Qty: 0 | Refills: 0 | DISCHARGE
Start: 2022-08-05

## 2022-08-05 RX ADMIN — Medication 325 MILLIGRAM(S): at 11:58

## 2022-08-05 RX ADMIN — Medication 60 MILLIGRAM(S): at 05:38

## 2022-08-05 RX ADMIN — SENNA PLUS 1 TABLET(S): 8.6 TABLET ORAL at 05:38

## 2022-08-05 RX ADMIN — Medication 500 MICROGRAM(S): at 13:42

## 2022-08-05 RX ADMIN — Medication 400 MILLIGRAM(S): at 05:43

## 2022-08-05 RX ADMIN — FAMOTIDINE 20 MILLIGRAM(S): 10 INJECTION INTRAVENOUS at 05:39

## 2022-08-05 RX ADMIN — Medication 100 GRAM(S): at 05:39

## 2022-08-05 RX ADMIN — Medication 400 MILLIGRAM(S): at 14:44

## 2022-08-05 RX ADMIN — CHLORHEXIDINE GLUCONATE 1 APPLICATION(S): 213 SOLUTION TOPICAL at 06:05

## 2022-08-05 RX ADMIN — POLYETHYLENE GLYCOL 3350 17 GRAM(S): 17 POWDER, FOR SOLUTION ORAL at 11:57

## 2022-08-05 RX ADMIN — METFORMIN HYDROCHLORIDE 1000 MILLIGRAM(S): 850 TABLET ORAL at 05:38

## 2022-08-05 RX ADMIN — Medication 500 MICROGRAM(S): at 08:38

## 2022-08-05 RX ADMIN — Medication 2: at 11:55

## 2022-08-05 RX ADMIN — Medication 5 UNIT(S): at 08:08

## 2022-08-05 RX ADMIN — Medication 60 MILLIGRAM(S): at 14:44

## 2022-08-05 RX ADMIN — INSULIN GLARGINE 30 UNIT(S): 100 INJECTION, SOLUTION SUBCUTANEOUS at 08:08

## 2022-08-05 RX ADMIN — Medication 600 MILLIGRAM(S): at 05:38

## 2022-08-05 NOTE — PROGRESS NOTE ADULT - SUBJECTIVE AND OBJECTIVE BOX
OPERATIVE PROCEDURE(s):        CABGx4 with left radial         POD #      4                 52yMale  SURGEON(s): ASHLYN Galo  SUBJECTIVE ASSESSMENT: patient seen and examined at bedside. no acute events overnight.   Vital Signs Last 24 Hrs  T(F): 97.5 (05 Aug 2022 08:00), Max: 97.9 (05 Aug 2022 04:00)  HR: 91 (05 Aug 2022 08:00) (67 - 108)  BP: 139/73 (05 Aug 2022 08:00) (90/55 - 141/85)  BP(mean): 100 (05 Aug 2022 08:00) (67 - 100)  RR: 22 (05 Aug 2022 08:00) (15 - 28)  SpO2: 92% (05 Aug 2022 08:00) (90% - 97%)    I&O's Detail    04 Aug 2022 07:01  -  05 Aug 2022 07:00  --------------------------------------------------------  IN:    Oral Fluid: 1120 mL    sodium chloride 0.9%: 20 mL  Total IN: 1140 mL    OUT:    Insulin: 0 mL    Voided (mL): 1800 mL  Total OUT: 1800 mL        Net:   I&O's Detail    03 Aug 2022 07:01  -  04 Aug 2022 07:00  --------------------------------------------------------  Total NET: -517 mL      04 Aug 2022 07:01  -  05 Aug 2022 07:00  --------------------------------------------------------  Total NET: -660 mL        CAPILLARY BLOOD GLUCOSE      POCT Blood Glucose.: 132 mg/dL (05 Aug 2022 07:26)  POCT Blood Glucose.: 157 mg/dL (04 Aug 2022 21:56)  POCT Blood Glucose.: 118 mg/dL (04 Aug 2022 16:25)  POCT Blood Glucose.: 226 mg/dL (04 Aug 2022 11:48)    Physical Exam:  General: NAD; A&Ox3  Cardiac: S1/S2, RRR, no murmur, no rubs  Lungs: unlabored respirations, CTA b/l, no wheeze, no rales, no crackles  Abdomen: Soft/NT/ND; positive bowel sounds x 4  Sternum: Intact, no click, incision healing well with no drainage  Incisions: Incisions clean/dry/intact  Extremities: No edema b/l lower extremities; good capillary refill; no cyanosis; palpable 1+ pedal pulses b/l    Central Venous Catheter: Yes[X]  No[] , If Yes indication:           Day # 4  EPICARDIAL WIRES:  [X] YES [] NO                                              Day #4  BOWEL MOVEMENT:  [] YES [X] NO, If No, Timing since last BM:      Day #4        LABS:                        9.4<L>  6.01  )-----------( 205      ( 05 Aug 2022 02:57 )             29.4<L>                        9.9<L>  6.66  )-----------( 187      ( 04 Aug 2022 01:30 )             30.5<L>    08-05    143  |  106  |  12  ----------------------------<  161<H>  4.4   |  23  |  0.5<L>  08-03    138  |  104  |  7<L>  ----------------------------<  131<H>  3.8   |  24  |  0.5<L>    Ca    8.5      05 Aug 2022 05:14  Mg     1.9     08-05    TPro  5.5<L> [6.0 - 8.0]  /  Alb  4.1 [3.5 - 5.2]  /  TBili  0.5 [0.2 - 1.2]  /  DBili  x   /  AST  32 [0 - 41]  /  ALT  42<H> [0 - 41]  /  AlkPhos  33 [30 - 115]  08-03        ABG - ( 04 Aug 2022 03:49 )  pH: 7.46  /  pCO2: 35    /  pO2: 65    / HCO3: 25    / Base Excess: 1.3   /  SaO2: 93.7  /  LA: 0.60             RADIOLOGY & ADDITIONAL TESTS:  CXR: < from: Xray Chest 1 View- PORTABLE-Routine (Xray Chest 1 View- PORTABLE-Routine in AM.) (08.05.22 @ 05:57) >  Impression:    Low lung volumes. Bibasilar atelectasis.    --- End of Report ---    < end of copied text >    EKG: < from: 12 Lead ECG (08.05.22 @ 07:18) >  Ventricular Rate 77 BPM    Atrial Rate 77 BPM    P-R Interval 146 ms    QRS Duration 142 ms    Q-T Interval 426 ms    QTC Calculation(Bazett) 482 ms    P Axis 22 degrees    R Axis -65 degrees    T Axis 44 degrees    Diagnosis Line *** Poor data quality, interpretation may be adversely affected  Normal sinus rhythm  Right bundle branch block  Left anterior fascicular block  *** Bifascicular block ***  Abnormal ECG    < end of copied text >    MEDICATIONS  (STANDING):  aspirin enteric coated 325 milliGRAM(s) Oral daily  atorvastatin 40 milliGRAM(s) Oral at bedtime  chlorhexidine 2% Cloths 1 Application(s) Topical <User Schedule>  dextrose 5%. 1000 milliLiter(s) (100 mL/Hr) IV Continuous <Continuous>  dextrose 5%. 1000 milliLiter(s) (50 mL/Hr) IV Continuous <Continuous>  dextrose 50% Injectable 25 Gram(s) IV Push once  dextrose 50% Injectable 12.5 Gram(s) IV Push once  dextrose 50% Injectable 25 Gram(s) IV Push once  diltiazem    Tablet 60 milliGRAM(s) Oral every 8 hours  famotidine    Tablet 20 milliGRAM(s) Oral two times a day  glucagon  Injectable 1 milliGRAM(s) IntraMuscular once  guaiFENesin  milliGRAM(s) Oral every 12 hours  heparin   Injectable 5000 Unit(s) SubCutaneous every 8 hours  ibuprofen  Tablet. 400 milliGRAM(s) Oral every 8 hours  insulin glargine Injectable (LANTUS) 30 Unit(s) SubCutaneous every morning  insulin lispro (ADMELOG) corrective regimen sliding scale   SubCutaneous three times a day before meals  insulin lispro Injectable (ADMELOG) 5 Unit(s) SubCutaneous three times a day before meals  ipratropium    for Nebulization 500 MICROGram(s) Nebulizer every 6 hours  magnesium sulfate  IVPB 1 Gram(s) IV Intermittent every 12 hours  metFORMIN 1000 milliGRAM(s) Oral every 12 hours  metoprolol tartrate 50 milliGRAM(s) Oral every 8 hours  polyethylene glycol 3350 17 Gram(s) Oral daily  senna 1 Tablet(s) Oral every 12 hours  sitaGLIPtin 50 milliGRAM(s) Oral daily    MEDICATIONS  (PRN):  dextrose Oral Gel 15 Gram(s) Oral once PRN Blood Glucose LESS THAN 70 milliGRAM(s)/deciliter  oxyCODONE    IR 10 milliGRAM(s) Oral every 4 hours PRN Moderate Pain (4 - 6)  oxyCODONE    IR 5 milliGRAM(s) Oral every 4 hours PRN Mild Pain (1 - 3)    HEPARIN:  [X] YES [] NO  Dose: 5000  UNITS Q8H  SCD's: YES b/l  GI Prophylaxis: Protonix [], Pepcid [X], None [], (Contra-indication:.....)    Post-Op Beta-Blockers: Yes [X], No[], If No, then contraindication:  Post-Op Aspirin: Yes [X],  No [], If No, then contraindication:  Post-Op Statin: Yes [X], No[], If No, then contraindication:  Allergies    No Known Allergies    Intolerances      Ambulation/Activity Status: OOB ambulating and stairs     Assessment/Plan:  52y Male status-post CABGx4 with left radial         POD #      4      - Case and plan discussed with CTU Intensivist and CT Surgeon - Dr. Galo/Jessica/Matthew  - Continue CTU supportive care    - Continue DVT/GI prophylaxis  - Incentive Spirometry 10 times an hour  - Continue to advance physical activity as tolerated and continue PT/OT as directed  1. CAD: Continue ASA, statin, BB  2. HTN: continue BB (change from q8 to q12) and CCB  3. A. Fib: continue ASA and BB, monitor electrolytes   4. COPD/Hypoxia: mucinex, encourage IS   5. DM/Glucose Control: lantus ademlog, ISS   6. d/c epicardial wires today   7. f/u standing PA/lateral     Social Service Disposition:  home    OPERATIVE PROCEDURE(s):        CABGx4 with left radial         POD #      4                 52yMale  SURGEON(s): ASHLYN Galo  SUBJECTIVE ASSESSMENT: patient seen and examined at bedside.  Patient states "the lady in the black scrubs () is colluding with other members of the team and want to harm him." Denies any similar thoughts prior to surgery. Denies SI/HI  Vital Signs Last 24 Hrs  T(F): 97.5 (05 Aug 2022 08:00), Max: 97.9 (05 Aug 2022 04:00)  HR: 91 (05 Aug 2022 08:00) (67 - 108)  BP: 139/73 (05 Aug 2022 08:00) (90/55 - 141/85)  BP(mean): 100 (05 Aug 2022 08:00) (67 - 100)  RR: 22 (05 Aug 2022 08:00) (15 - 28)  SpO2: 92% (05 Aug 2022 08:00) (90% - 97%)    I&O's Detail    04 Aug 2022 07:01  -  05 Aug 2022 07:00  --------------------------------------------------------  IN:    Oral Fluid: 1120 mL    sodium chloride 0.9%: 20 mL  Total IN: 1140 mL    OUT:    Insulin: 0 mL    Voided (mL): 1800 mL  Total OUT: 1800 mL        Net:   I&O's Detail    03 Aug 2022 07:01  -  04 Aug 2022 07:00  --------------------------------------------------------  Total NET: -517 mL      04 Aug 2022 07:01  -  05 Aug 2022 07:00  --------------------------------------------------------  Total NET: -660 mL        CAPILLARY BLOOD GLUCOSE      POCT Blood Glucose.: 132 mg/dL (05 Aug 2022 07:26)  POCT Blood Glucose.: 157 mg/dL (04 Aug 2022 21:56)  POCT Blood Glucose.: 118 mg/dL (04 Aug 2022 16:25)  POCT Blood Glucose.: 226 mg/dL (04 Aug 2022 11:48)    Physical Exam:  General: NAD; A&Ox3  Cardiac: S1/S2, RRR, no murmur, no rubs  Lungs: unlabored respirations, CTA b/l, no wheeze, no rales, no crackles  Abdomen: Soft/NT/ND; positive bowel sounds x 4  Sternum: Intact, no click, incision healing well with no drainage  Incisions: Incisions clean/dry/intact  Extremities: No edema b/l lower extremities; good capillary refill; no cyanosis; palpable 1+ pedal pulses b/l    Central Venous Catheter: Yes[X]  No[] , If Yes indication:           Day # 4  EPICARDIAL WIRES:  [X] YES [] NO                                              Day #4  BOWEL MOVEMENT:  [] YES [X] NO, If No, Timing since last BM:      Day #4        LABS:                        9.4<L>  6.01  )-----------( 205      ( 05 Aug 2022 02:57 )             29.4<L>                        9.9<L>  6.66  )-----------( 187      ( 04 Aug 2022 01:30 )             30.5<L>    08-05    143  |  106  |  12  ----------------------------<  161<H>  4.4   |  23  |  0.5<L>  08-03    138  |  104  |  7<L>  ----------------------------<  131<H>  3.8   |  24  |  0.5<L>    Ca    8.5      05 Aug 2022 05:14  Mg     1.9     08-05    TPro  5.5<L> [6.0 - 8.0]  /  Alb  4.1 [3.5 - 5.2]  /  TBili  0.5 [0.2 - 1.2]  /  DBili  x   /  AST  32 [0 - 41]  /  ALT  42<H> [0 - 41]  /  AlkPhos  33 [30 - 115]  08-03        ABG - ( 04 Aug 2022 03:49 )  pH: 7.46  /  pCO2: 35    /  pO2: 65    / HCO3: 25    / Base Excess: 1.3   /  SaO2: 93.7  /  LA: 0.60       RADIOLOGY & ADDITIONAL TESTS:  CXR: < from: Xray Chest 1 View- PORTABLE-Routine (Xray Chest 1 View- PORTABLE-Routine in AM.) (08.05.22 @ 05:57) >  Impression:    Low lung volumes. Bibasilar atelectasis.    --- End of Report ---    < end of copied text >    EKG: < from: 12 Lead ECG (08.05.22 @ 07:18) >  Ventricular Rate 77 BPM    Atrial Rate 77 BPM    P-R Interval 146 ms    QRS Duration 142 ms    Q-T Interval 426 ms    QTC Calculation(Bazett) 482 ms    P Axis 22 degrees    R Axis -65 degrees    T Axis 44 degrees    Diagnosis Line *** Poor data quality, interpretation may be adversely affected  Normal sinus rhythm  Right bundle branch block  Left anterior fascicular block  *** Bifascicular block ***  Abnormal ECG    < end of copied text >    MEDICATIONS  (STANDING):  aspirin enteric coated 325 milliGRAM(s) Oral daily  atorvastatin 40 milliGRAM(s) Oral at bedtime  chlorhexidine 2% Cloths 1 Application(s) Topical <User Schedule>  dextrose 5%. 1000 milliLiter(s) (100 mL/Hr) IV Continuous <Continuous>  dextrose 5%. 1000 milliLiter(s) (50 mL/Hr) IV Continuous <Continuous>  dextrose 50% Injectable 25 Gram(s) IV Push once  dextrose 50% Injectable 12.5 Gram(s) IV Push once  dextrose 50% Injectable 25 Gram(s) IV Push once  diltiazem    Tablet 60 milliGRAM(s) Oral every 8 hours  famotidine    Tablet 20 milliGRAM(s) Oral two times a day  glucagon  Injectable 1 milliGRAM(s) IntraMuscular once  guaiFENesin  milliGRAM(s) Oral every 12 hours  heparin   Injectable 5000 Unit(s) SubCutaneous every 8 hours  ibuprofen  Tablet. 400 milliGRAM(s) Oral every 8 hours  insulin glargine Injectable (LANTUS) 30 Unit(s) SubCutaneous every morning  insulin lispro (ADMELOG) corrective regimen sliding scale   SubCutaneous three times a day before meals  ipratropium    for Nebulization 500 MICROGram(s) Nebulizer every 6 hours  magnesium sulfate  IVPB 1 Gram(s) IV Intermittent every 12 hours  metFORMIN 1000 milliGRAM(s) Oral every 12 hours  metoprolol tartrate 50 milliGRAM(s) Oral every 8 hours  polyethylene glycol 3350 17 Gram(s) Oral daily  senna 1 Tablet(s) Oral every 12 hours  sitaGLIPtin 50 milliGRAM(s) Oral daily    MEDICATIONS  (PRN):  dextrose Oral Gel 15 Gram(s) Oral once PRN Blood Glucose LESS THAN 70 milliGRAM(s)/deciliter  oxyCODONE    IR 10 milliGRAM(s) Oral every 4 hours PRN Moderate Pain (4 - 6)  oxyCODONE    IR 5 milliGRAM(s) Oral every 4 hours PRN Mild Pain (1 - 3)    HEPARIN:  [X] YES [] NO  Dose: 5000  UNITS Q8H  SCD's: YES b/l  GI Prophylaxis: Protonix [], Pepcid [X], None [], (Contra-indication:.....)    Post-Op Beta-Blockers: Yes [X], No[], If No, then contraindication:  Post-Op Aspirin: Yes [X],  No [], If No, then contraindication:  Post-Op Statin: Yes [X], No[], If No, then contraindication:  Allergies    No Known Allergies    Intolerances    Ambulation/Activity Status: OOB ambulating and stairs     Assessment/Plan:  52y Male status-post CABGx4 with left radial         POD #      4      - Case and plan discussed with CTU Intensivist and CT Surgeon - Dr. Galo/Jessica/Matthew  - Continue CTU supportive care    - Continue DVT/GI prophylaxis  - Incentive Spirometry 10 times an hour  - Continue to advance physical activity as tolerated and continue PT/OT as directed  1. CAD: Continue ASA, statin, BB  2. HTN: continue BB (change from q8 to q12) and CCB  3. A. Fib: continue ASA and BB, monitor electrolytes   4. COPD/Hypoxia: mucinex, encourage IS, wean off O2    5. DM/Glucose Control: lantus, ISS   6. d/c epicardial wires today   7. f/u standing PA/lateral today   8. f/u w/ wife for collateral and psych consult for paranoid thoughts. If cleared by psych, can go home     Social Service Disposition:  home

## 2022-08-05 NOTE — BH CONSULTATION LIAISON ASSESSMENT NOTE - NSBHMSEINTELL_PSY_A_CORE
Encounter Date: 2/15/2020       History     Chief Complaint   Patient presents with    Fever     Dx w/flu at urgent care today     HPI  Review of patient's allergies indicates:  No Known Allergies  Past Medical History:   Diagnosis Date    Club foot of both lower extremities      Past Surgical History:   Procedure Laterality Date    CIRCUMCISION       Family History   Problem Relation Age of Onset    Asthma Mother     Diabetes Maternal Grandmother     Depression Paternal Grandmother     Anxiety disorder Paternal Grandmother      Social History     Tobacco Use    Smoking status: Never Smoker    Smokeless tobacco: Never Used   Substance Use Topics    Alcohol use: Not on file    Drug use: Not on file     Review of Systems    Physical Exam     Initial Vitals [02/15/20 1602]   BP Pulse Resp Temp SpO2   -- (!) 180 24 (!) 101.8 °F (38.8 °C) 97 %      MAP       --         Physical Exam    ED Course   Procedures  Labs Reviewed   POCT INFLUENZA A/B MOLECULAR          Imaging Results    None          Medical Decision Making:   History:   I obtained history from: someone other than patient.       <> Summary of History: Mother     Old Medical Records: I decided to obtain old medical records.  Old Records Summarized: records from clinic visits.       <> Summary of Records: Reviewed Clinic notes and prior ER visit notes in [facility]. Significant findings addressed in HPI / PMH.    Initial Assessment:   Hemodynamically stable child with acute febrile illness, unlikely to be Influenza in view of present symptoms or OME as spikes in fever to 103+  Differential Diagnosis:   DDX includes:  Fever - Influenza, parainfluenza, adenovirus, enterovirus, evolving sinusitis, evolving coxsackie virus infection, evolving OME, evolving pneumonia, UTI    Clinical Tests:   Lab Tests: Ordered and Reviewed              Attending Attestation:   Physician Attestation Statement for Resident:  As the supervising MD   Physician Attestation  Statement: I have personally seen and examined this patient.   I agree with the above history. -:   As the supervising MD I agree with the above PE.    As the supervising MD I agree with the above treatment, course, plan, and disposition.  I have reviewed and agree with the residents interpretation of the following: lab data.  I have reviewed the following: old records at this facility.            Attending ED Notes:   I have seen and examined this patient. I have repeated pertinent aspects of history and physical exam documented by the Resident and agree with findings, management plan and disposition as documented in Resident Note.    18 mo WM with 2-3 day history of cough, congestion and fever to 102 who was seen at an Urgent Care earlier today and told had Influenza and ROM for which Tamiflu and cefdinir were prescribed. Child now comes to ER due to spike in fever to 103. 4 which improved with antipyretics. Appetite and activity have remained nearly normal. No vomiting. One loose stool earlier today. Some decrease in urination. Occasional cough . Possibly is wheezing intermittently however mother has been told his chest was clear on several occasions when she thought he was wheezing. No apparent increased work of breathing or respiratory distress when thought to be wheezing. No pulling at ears or apparent throat, head, chest or abdominal pain . Possibly some body aches.  PMH: No asthma, seizures  FH: Asthma       Awake, alert in NAD    HEENT:  NC/AT  Sclera clear  TM's mildly dull without erythema or abnormal light reflex.  Nasal mucosa boggy with clear rhinorrhea  Oral mucosa wet without lesions    Neck: Supple  Shotty nontender posterior chain adenopathy   Chest:  BBSCE  Normal work of breathing  Mild referred upper airway sounds    Abdomen:  Benign                           Clinical Impression:       ICD-10-CM ICD-9-CM   1. Acute febrile illness in pediatric patient R50.9 780.60   2. Viral syndrome B34.9  079.99   3. Acute myringitis, unspecified laterality H73.009 384.00                             Duarte Shaw III, MD  02/15/20 [id number]     Average

## 2022-08-05 NOTE — DISCHARGE NOTE NURSING/CASE MANAGEMENT/SOCIAL WORK - PATIENT PORTAL LINK FT
You can access the FollowMyHealth Patient Portal offered by [facility] by registering at the following website: [email or web address]. By joining [employer]’s FollowMyHealth portal, you will also be able to view your health information using other applications (apps) compatible with our system.

## 2022-08-05 NOTE — BH CONSULTATION LIAISON ASSESSMENT NOTE - SUMMARY
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    On evaluation, this patient was observed to very anxious initially, but became very calm as this writer began speaking. He has full recollection of making the conspiracy statement toward the staff.  He attributed his behavior to his eagerness to leave the hospital, reportedly he was told today was his discharge date and he was contemplating to be with his wife and three children as quick as possible. Currently there is no psychosis during this evaluation and given no prior psychiatric or psychotic disorder, it is unlikely this patient is experiencing any exacerbation of a prior psychiatric disorder. He has no prior history of bipolar, no prior history of MDD or anxiety, however, given the complexity of the procedure, he is at risk of delirium. On evaluation he was observed to be goal directed with his statements, fully oriented to situation, nonetheless, this cannot fully exclude delirium since it fluctuates. However at this time there is no psychiatric contraindication to discharge this patient.    Impression  R/o delirium  Adjustment  disorder with anxious mood.    Plan   monitor for delirium  On evaluation he was observed to be goal directed with his statements, fully oriented to situation, nonetheless, this cannot fully exclude delirium since it fluctuates. 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    On evaluation, this patient was observed to very anxious initially, but became very calm as this writer began speaking. He has full recollection of making the conspiracy statement toward the staff.  He attributed his behavior to his eagerness to leave the hospital, reportedly he was told today was his discharge date and he was contemplating to be with his wife and three children as quick as possible. Currently there is no psychosis during this evaluation and given no prior psychiatric or psychotic disorder, it is unlikely this patient is experiencing any exacerbation of a prior psychiatric disorder. He has no prior history of bipolar, no prior history of MDD or anxiety, however, given the complexity of the procedure, he is at risk of delirium. On evaluation he was observed to be goal directed with his statements, fully oriented to situation, nonetheless, this cannot fully exclude delirium since it fluctuates. However at this time there is no psychiatric contraindication to discharge this patient.    Impression  R/o delirium  Adjustment  disorder with anxious mood.    Plan   -On going delirium work up as per primary team  On evaluation he was observed to be goal directed with his statements, fully oriented to situation, nonetheless, this cannot fully exclude delirium since it fluctuates. However at this time there is no psychiatric contraindication to discharge this patient once he is medically cleared  No indication for psychiatric 1:1 at this time  -May consider outpatient referral to [facility] outpatient psychiatry service located at [address] [place] [address], [id number], [phone]

## 2022-08-05 NOTE — BH CONSULTATION LIAISON ASSESSMENT NOTE - NSBHCHARTREVIEWVS_PSY_A_CORE FT
Vital Signs Last 24 Hrs  T(C): 36.4 (05 Aug 2022 08:00), Max: 36.6 (05 Aug 2022 00:00)  T(F): 97.5 (05 Aug 2022 08:00), Max: 97.9 (05 Aug 2022 04:00)  HR: 93 (05 Aug 2022 11:00) (67 - 108)  BP: 158/82 (05 Aug 2022 11:00) (94/59 - 158/85)  BP(mean): 111 (05 Aug 2022 11:00) (72 - 115)  RR: 26 (05 Aug 2022 11:00) (15 - 28)  SpO2: 94% (05 Aug 2022 11:00) (90% - 97%)    Parameters below as of 05 Aug 2022 11:00  Patient On (Oxygen Delivery Method): room air

## 2022-08-05 NOTE — BH CONSULTATION LIAISON ASSESSMENT NOTE - NSBHCHARTREVIEWINVESTIGATE_PSY_A_CORE FT
< from: 12 Lead ECG (08.05.22 @ 07:18) >      Ventricular Rate 77 BPM    Atrial Rate 77 BPM    P-R Interval 146 ms    QRS Duration 142 ms    Q-T Interval 426 ms    QTC Calculation(Bazett) 482 ms    P Axis 22 degrees    R Axis -65 degrees    T Axis 44 degrees    Diagnosis Line *** Poor data quality, interpretation may be adversely affected  Normal sinus rhythm  Right bundle branch block  Left anterior fascicular block  *** Bifascicular block ***  Abnormal ECG    Confirmed by HOLLY TERRELL MD ([id number]) on 8/5/2022 8:13:30 AM    < end of copied text >

## 2022-08-05 NOTE — PROGRESS NOTE ADULT - SUBJECTIVE AND OBJECTIVE BOX
SUBJ: Patient seen and examined. No events overnight.       MEDICATIONS  (STANDING):  aspirin enteric coated 325 milliGRAM(s) Oral daily  atorvastatin 40 milliGRAM(s) Oral at bedtime  chlorhexidine 2% Cloths 1 Application(s) Topical <User Schedule>  dextrose 5%. 1000 milliLiter(s) (100 mL/Hr) IV Continuous <Continuous>  dextrose 5%. 1000 milliLiter(s) (50 mL/Hr) IV Continuous <Continuous>  dextrose 50% Injectable 25 Gram(s) IV Push once  dextrose 50% Injectable 12.5 Gram(s) IV Push once  dextrose 50% Injectable 25 Gram(s) IV Push once  diltiazem    Tablet 60 milliGRAM(s) Oral every 8 hours  famotidine    Tablet 20 milliGRAM(s) Oral two times a day  glucagon  Injectable 1 milliGRAM(s) IntraMuscular once  guaiFENesin  milliGRAM(s) Oral every 12 hours  heparin   Injectable 5000 Unit(s) SubCutaneous every 8 hours  ibuprofen  Tablet. 400 milliGRAM(s) Oral every 8 hours  insulin lispro (ADMELOG) corrective regimen sliding scale   SubCutaneous three times a day before meals  ipratropium    for Nebulization 500 MICROGram(s) Nebulizer every 6 hours  magnesium sulfate  IVPB 1 Gram(s) IV Intermittent every 12 hours  metFORMIN 1000 milliGRAM(s) Oral every 12 hours  metoprolol tartrate 50 milliGRAM(s) Oral every 12 hours  polyethylene glycol 3350 17 Gram(s) Oral daily  senna 1 Tablet(s) Oral every 12 hours  sitaGLIPtin 50 milliGRAM(s) Oral daily    MEDICATIONS  (PRN):  dextrose Oral Gel 15 Gram(s) Oral once PRN Blood Glucose LESS THAN 70 milliGRAM(s)/deciliter  oxyCODONE    IR 10 milliGRAM(s) Oral every 4 hours PRN Moderate Pain (4 - 6)  oxyCODONE    IR 5 milliGRAM(s) Oral every 4 hours PRN Mild Pain (1 - 3)            Vital Signs Last 24 Hrs  T(C): 36.4 (05 Aug 2022 08:00), Max: 36.6 (05 Aug 2022 00:00)  T(F): 97.5 (05 Aug 2022 08:00), Max: 97.9 (05 Aug 2022 04:00)  HR: 93 (05 Aug 2022 11:00) (67 - 108)  BP: 158/82 (05 Aug 2022 11:00) (94/59 - 158/85)  BP(mean): 111 (05 Aug 2022 11:00) (72 - 115)  RR: 26 (05 Aug 2022 11:00) (15 - 28)  SpO2: 94% (05 Aug 2022 11:00) (90% - 97%)    Parameters below as of 05 Aug 2022 11:00  Patient On (Oxygen Delivery Method): room air         REVIEW OF SYSTEMS:  CONSTITUTIONAL: No fever  NECK: No pain or stiffness  CARDIOVASCULAR: patient denies chest pain, shortness of breath or palpitations .  Repiratory: No cough or wheezing.  NEUROLOGICAL: No focal deficits to report.  GI: no BRBPR, no N,V,diarrhea.    PSYCHIATRY: normal mood and affect  HEENT: no nasal discharge, no ecchymosis  SKIN: no ecchymosis, no breakdown  MUSCULOSKELETAL: Full range of motion x4.      PHYSICAL EXAM:  GENERAL: NAD  HEAD:  Atraumatic, Normocephalic  NECK: Supple, No JVD  NERVOUS SYSTEM:  Alert & Oriented X3, Good concentration  CHEST/LUNG: Clear to anterior auscultation bilaterally  HEART: Regular rate and rhythm;   ABDOMEN: Soft, Nontender, Nondistended;   EXTREMITIES:  No  edema      	  TELEMETRY:      LABS:                        9.4    6.01  )-----------( 205      ( 05 Aug 2022 02:57 )             29.4     08-05    143  |  106  |  12  ----------------------------<  161<H>  4.4   |  23  |  0.5<L>    Ca    8.5      05 Aug 2022 05:14  Mg     1.9     08-05              I&O's Summary    04 Aug 2022 07:01  -  05 Aug 2022 07:00  --------------------------------------------------------  IN: 1140 mL / OUT: 1800 mL / NET: -660 mL    05 Aug 2022 07:01  -  05 Aug 2022 11:07  --------------------------------------------------------  IN: 240 mL / OUT: 500 mL / NET: -260 mL      BNP  RADIOLOGY & ADDITIONAL STUDIES:    IMPRESSION AND PLAN:    IMPRESSION AND PLAN:  3V CAD S/P CABG  HTN  - Management as per primary team  - Continue ASA, metoprolol and Statin   - Will follow outpt

## 2022-08-05 NOTE — PROGRESS NOTE ADULT - PROVIDER SPECIALTY LIST ADULT
CT Surgery
CT Surgery
Critical Care
CT Surgery
Cardiology
Critical Care

## 2022-08-05 NOTE — BH CONSULTATION LIAISON ASSESSMENT NOTE - EMPLOYMENT
Addended by: ABIODUN ABARCA on: 5/3/2017 04:08 PM     Modules accepted: Orders    
Addended by: NICK SPARROW on: 5/3/2017 02:26 PM     Modules accepted: Orders    
Employed

## 2022-08-05 NOTE — PROGRESS NOTE ADULT - SUBJECTIVE AND OBJECTIVE BOX
CTU Attending Progress Daily Note     05 Aug 2022 07:40  Admited 07-29-22, Hospital Day 7d  POD# -     HPI:  53 y/o M PMH:  bladder cancer, DM, ED, HTN, HLD, Covid 1/21, ex-smoker, + FH MI                PSH: bladder tumor resection, umbilical hernia repair    Pt reports SOB and CP with exertion , unable to walk few blocks d/t chest pain, sometimes has L arm pain, pt had submaximal stress test, LHC recommended for exertional chest pain increasing in frequency        Vital Signs Last 24 Hrs  T(C): --  T(F): --  HR: --  BP --107/70  BP(mean): --72  RR: --  SpO2: --        Pre cath note:  indication:  [ ] STEMI                [ ] NSTEMI                 [ ] Acute coronary syndrome                   [ ]Unstable Angina   [ ] high risk  [ ] intermediate risk  [ ] low risk                   [ x Stable Angina     non-invasive testing:                          Date:                     result: [ ] high risk  [  intermediate risk  [ ] low risk    Anti- Anginal medications:                    [ ] not used                       [ x used                   [ ] not used but strong indication not to use    Ejection Fraction                   [ ] <29            [ ] 30-39%   [ ] 40-49%     [ ]>50%    CHF                   [ ] active (within last 14 days on meds   [ ] Chronic (on meds but no exacerbation)    COPD                   [ ] mild (on chronic bronchodilators)  [ ] moderate (on chronic steroid therapy)      [ ] severe (indication for home O2 or PACO2 >50)    Other risk factors:                     [ ] Previous MI                     [ ] CVA/ stroke                    [ ] carotid stent/ CEA                    [ ] PVD/PAD- (arterial aneurysm, non-palpable pulses, tortuous vessel with inability to insert catheter, infra-renal dissection, renal or subclavian artery stenosis)                    [x] diabetic                    [ ] previous CABG                    [ ] Renal Failure     Bleeding Risk: 0.8%    REVIEW OF SYSTEMS:  CONSTITUTIONAL: No fever, weight loss, or fatigue  CARDIOLOGY: + chest painshortness of breath with exertion  RESPIRATORY: denies shortness of breath, wheezing  NEUROLOGICAL: NO weakness, no focal deficits to report.  ENDOCRINOLOGICAL: no recent change in diabetic medications.   GI: no BRBPR, no N,V,diarrhea.     PHYSICAL EXAM:  · CONSTITUTIONAL:	Well-developed, well nourished    ·RESPIRATORY:   airway patent; breath sounds equal; good air movement; respirations non-labored; clear to auscultation bilaterally; no chest wall tenderness; no intercostal retractions; no rales,rhonchi or wheeze  · CARDIOVASCULAR	regular rate and rhythm  no rub  no murmur  normal PMI  · EXTREMITIES: No cyanosis, clubbing or edema  · VASCULAR: 	Equal and normal pulses (carotid, femoral, dorsalis pedis)  	        EKG: SR  EF: Not available     (29 Jul 2022 07:16)    Home Medications:  Aspirin Low Dose 81 mg oral tablet, chewable: 1 tab(s) orally once a day (29 Jul 2022 07:14)  enalapril-hydrochlorothiazide 10 mg-25 mg oral tablet: 1 tab(s) orally once a day (29 Jul 2022 07:14)  gemfibrozil 600 mg oral tablet: 1 tab(s) orally 2 times a day (29 Jul 2022 07:14)  Janumet 50 mg-1000 mg oral tablet: 1 tab(s) orally 2 times a day (29 Jul 2022 07:14)  Metoprolol Succinate ER 25 mg oral tablet, extended release: 1 tab(s) orally once a day (29 Jul 2022 07:14)  simvastatin 40 mg oral tablet: 1 tab(s) orally once a day (at bedtime) (29 Jul 2022 07:14)    FAMILY HISTORY:  FH: lung cancer (Mother)    FH: breast cancer (Mother)    FH: heart disease (Father)      PAST MEDICAL & SURGICAL HISTORY:  HTN (hypertension)      Diabetes      Bladder tumor      Hypercholesterolemia      Lung nodules      Hepatic steatosis      Erectile dysfunction      2019 novel coronavirus disease (COVID-19)  1/21      History of hernia repair      History of bladder surgery  TURBT X2        Interval event for past 24 hr:  MINE ENRIQUEZ  52y had no event.   Current Complains:  MINE ENRIQUEZ has no new complains  Allergies    No Known Allergies    Intolerances      OBJECTIVE:  Vitals last 24 hrs  T(C): 36.6 (08-05-22 @ 04:00), Max: 36.6 (08-05-22 @ 00:00)  T(F): 97.9 (08-05-22 @ 04:00), Max: 97.9 (08-05-22 @ 04:00)  HR: 80 (08-05-22 @ 06:00) (67 - 108)  BP: 130/76 (08-05-22 @ 06:00) (90/55 - 141/85)  ABP: 108/65 (08-04-22 @ 09:00) (108/56 - 108/65)  ABP(mean): 81 (08-04-22 @ 09:00) (71 - 81)  RR: 23 (08-05-22 @ 06:00) (15 - 28)  SpO2: 96% (08-05-22 @ 06:00) (89% - 97%)  CVP(mm Hg): --  CI: --  PA: --  PA(direct): --  PCWP: --  SVR: --  PVR: --    08-04-22 @ 07:01  -  08-05-22 @ 07:00  --------------------------------------------------------  IN:    Oral Fluid: 1120 mL    sodium chloride 0.9%: 20 mL  Total IN: 1140 mL    OUT:    Insulin: 0 mL    Voided (mL): 1800 mL  Total OUT: 1800 mL    Total NET: -660 mL             CAPILLARY BLOOD GLUCOSE      POCT Blood Glucose.: 132 mg/dL (05 Aug 2022 07:26)  POCT Blood Glucose.: 157 mg/dL (04 Aug 2022 21:56)  POCT Blood Glucose.: 118 mg/dL (04 Aug 2022 16:25)  POCT Blood Glucose.: 226 mg/dL (04 Aug 2022 11:48)    LABS:  ABG - ( 04 Aug 2022 03:49 )  pH, Arterial: 7.46  pH, Blood: x     /  pCO2: 35    /  pO2: 65    / HCO3: 25    / Base Excess: 1.3   /  SaO2: 93.7                                    9.4    6.01  )-----------( 205      ( 05 Aug 2022 02:57 )             29.4     Hemoglobin: 9.4 g/dL (08-05 @ 02:57)  Hemoglobin: 9.9 g/dL (08-04 @ 01:30)  Hemoglobin: 9.6 g/dL (08-03 @ 05:05)  Hemoglobin: 9.2 g/dL (08-02 @ 13:35)    08-05    143  |  106  |  12  ----------------------------<  161<H>  4.4   |  23  |  0.5<L>    Ca    8.5      05 Aug 2022 05:14  Mg     1.9     08-05      Creatinine, Serum: 0.5 mg/dL (08-05 @ 05:14)  Creatinine, Serum: 0.5 mg/dL (08-03 @ 05:05)  Creatinine, Serum: 0.6 mg/dL (08-02 @ 13:35)  Creatinine, Serum: 0.5 mg/dL (08-02 @ 02:00)  Creatinine, Serum: 0.6 mg/dL (08-01 @ 13:40)          HOSPITAL MEDICATIONS:  MEDICATIONS  (STANDING):  aspirin enteric coated 325 milliGRAM(s) Oral daily  atorvastatin 40 milliGRAM(s) Oral at bedtime  chlorhexidine 2% Cloths 1 Application(s) Topical <User Schedule>  dextrose 5%. 1000 milliLiter(s) (100 mL/Hr) IV Continuous <Continuous>  dextrose 5%. 1000 milliLiter(s) (50 mL/Hr) IV Continuous <Continuous>  dextrose 50% Injectable 25 Gram(s) IV Push once  dextrose 50% Injectable 12.5 Gram(s) IV Push once  dextrose 50% Injectable 25 Gram(s) IV Push once  diltiazem    Tablet 60 milliGRAM(s) Oral every 8 hours  famotidine    Tablet 20 milliGRAM(s) Oral two times a day  glucagon  Injectable 1 milliGRAM(s) IntraMuscular once  guaiFENesin  milliGRAM(s) Oral every 12 hours  heparin   Injectable 5000 Unit(s) SubCutaneous every 8 hours  ibuprofen  Tablet. 400 milliGRAM(s) Oral every 8 hours  insulin glargine Injectable (LANTUS) 30 Unit(s) SubCutaneous every morning  insulin lispro (ADMELOG) corrective regimen sliding scale   SubCutaneous three times a day before meals  insulin lispro Injectable (ADMELOG) 5 Unit(s) SubCutaneous three times a day before meals  ipratropium    for Nebulization 500 MICROGram(s) Nebulizer every 6 hours  magnesium sulfate  IVPB 1 Gram(s) IV Intermittent every 12 hours  metFORMIN 1000 milliGRAM(s) Oral every 12 hours  metoprolol tartrate 50 milliGRAM(s) Oral every 8 hours  polyethylene glycol 3350 17 Gram(s) Oral daily  senna 1 Tablet(s) Oral every 12 hours  sitaGLIPtin 50 milliGRAM(s) Oral daily    MEDICATIONS  (PRN):  dextrose Oral Gel 15 Gram(s) Oral once PRN Blood Glucose LESS THAN 70 milliGRAM(s)/deciliter  oxyCODONE    IR 10 milliGRAM(s) Oral every 4 hours PRN Moderate Pain (4 - 6)  oxyCODONE    IR 5 milliGRAM(s) Oral every 4 hours PRN Mild Pain (1 - 3)      REVIEW OF SYSTEMS:  CONSTITUTIONAL: [X] all negative; [ ] weakness, [ ] fevers, [ ] chills  EYES/ENT: [X] all negative; [ ] visual changes, [ ] vertigo, [ ] throat pain, [ ] eye pain  NECK: [X] all negative; [ ] pain, [ ] stiffness  RESPIRATORY: [ ] all negative, [ ] cough, [ ] wheezing, [ ] hemoptysis, [ ] shortness of breath, [  ] chest pain  CARDIOVASCULAR: [X] all negative; [ ] anginal chest pain, [ ] palpitations, [ ] orthopnea  GASTROINTESTINAL: [X] all negative; [ ]abdominal pain, [ ] nausea, [ ] vomiting, [ ] hematemesis, [ ] diarrhea, [ ] constipation, [ ] melena, [ ] hematochezia.  GENITOURINARY: [X] all negative; [ ] dysuria, [ ] frequency, [ ] hematuria  NEUROLOGICAL: [X] all negative; [ ] numbness, [ ] weakness, [ ] paresthesias  MUSCULOSKELETAL: [X] all negative, [ ] joint pain, [ ] joint swelling, [ ] joint redness, [ ] bone pain  SKIN: [X] all negative; [ ] itching, [ ] burning, [ ] rashes, [ ] lesions   All other review of systems is negative unless indicated above.    [  ] Unable to assess ROS because     PHYSICAL EXAM:          CONSTITUTIONAL: Well-developed; well-nourished; in no acute distress.   	SKIN: warm, dry, no rashes or lesions  	HEENT: Atraumatic. Normocephalic. PERRL. Moist membranes, no conjunctival injection, sclera clear  	NECK: Supple; non tender.  No JVD. No lymphadenopathy.  	CARD: Normal S1, S2. Rate and Rhythm are regular. No murmurs.  	RESP: Good air entry bilaterally, no wheezes, no rales no rhonchi.  	ABD: Soft, not tender, not distended, no CVA tenderness, no rebound no guarding, bowel sounds present  	EXT: Normal ROM.  No clubbing, no cyanosis, no pedal edema, no calf pain b/l, Peripheral pulses intact.  	LYMPH: No acute adenopathy.  	NEURO: Alert, awake, motor 5/5 R, 5/5 L, sensation intact bilat, CN 2-12 intact,          PSYCH: Cooperative, appropriate. Alert & oriented x 3    RADIOLOGY:  X Reviewed and interpreted by me  CxR from 08-05-22 shows mild congestion, no pneumothorax, no effusion, no cardiomegaly,   ETT above cara in good position, NGT in place, TLC in place, S-G Catheter in place, Chest Tubes in place,     ECG:  X Reviewed and interpreted by me CTU Attending Progress Daily Note     05 Aug 2022 07:40  Admited 07-29-22, Hospital Day 7d  POD# - 4    HPI:  51 y/o M PMH:  bladder cancer, DM, ED, HTN, HLD, Covid 1/21, ex-smoker, + FH MI                PSH: bladder tumor resection, umbilical hernia repair    Pt reports SOB and CP with exertion , unable to walk few blocks d/t chest pain, sometimes has L arm pain, pt had submaximal stress test, LHC recommended for exertional chest pain increasing in frequency    Home Medications:  Aspirin Low Dose 81 mg oral tablet, chewable: 1 tab(s) orally once a day (29 Jul 2022 07:14)  enalapril-hydrochlorothiazide 10 mg-25 mg oral tablet: 1 tab(s) orally once a day (29 Jul 2022 07:14)  gemfibrozil 600 mg oral tablet: 1 tab(s) orally 2 times a day (29 Jul 2022 07:14)  Janumet 50 mg-1000 mg oral tablet: 1 tab(s) orally 2 times a day (29 Jul 2022 07:14)  Metoprolol Succinate ER 25 mg oral tablet, extended release: 1 tab(s) orally once a day (29 Jul 2022 07:14)  simvastatin 40 mg oral tablet: 1 tab(s) orally once a day (at bedtime) (29 Jul 2022 07:14)    FAMILY HISTORY:  FH: lung cancer (Mother)  FH: breast cancer (Mother)  FH: heart disease (Father)      PAST MEDICAL & SURGICAL HISTORY:  HTN (hypertension)  Diabetes  Bladder tumor  Hypercholesterolemia  Lung nodules  Hepatic steatosis  Erectile dysfunction  2019 novel coronavirus disease (COVID-19) 1/21    History of hernia repair  History of bladder surgery TURBT X2    Interval event for past 24 hr:  MINE ENRIQUEZ  52y had as reported by nursing staff that some people in hospital are here to get him, trying to run out from X-ray department .   Current Complains:  MINE ENRIQUEZ has no new complains  Allergies    No Known Allergies    Intolerances      OBJECTIVE:  Vitals last 24 hrs  T(C): 36.6 (08-05-22 @ 04:00), Max: 36.6 (08-05-22 @ 00:00)  T(F): 97.9 (08-05-22 @ 04:00), Max: 97.9 (08-05-22 @ 04:00)  HR: 80 (08-05-22 @ 06:00) (67 - 108)  BP: 130/76 (08-05-22 @ 06:00) (90/55 - 141/85)  ABP: 108/65 (08-04-22 @ 09:00) (108/56 - 108/65)  ABP(mean): 81 (08-04-22 @ 09:00) (71 - 81)  RR: 23 (08-05-22 @ 06:00) (15 - 28)  SpO2: 96% (08-05-22 @ 06:00) (89% - 97%)      08-04-22 @ 07:01  -  08-05-22 @ 07:00  --------------------------------------------------------  IN:    Oral Fluid: 1120 mL    sodium chloride 0.9%: 20 mL  Total IN: 1140 mL    OUT:    Insulin: 0 mL    Voided (mL): 1800 mL  Total OUT: 1800 mL    Total NET: -660 mL             CAPILLARY BLOOD GLUCOSE      POCT Blood Glucose.: 132 mg/dL (05 Aug 2022 07:26)  POCT Blood Glucose.: 157 mg/dL (04 Aug 2022 21:56)  POCT Blood Glucose.: 118 mg/dL (04 Aug 2022 16:25)  POCT Blood Glucose.: 226 mg/dL (04 Aug 2022 11:48)    LABS:  ABG - ( 04 Aug 2022 03:49 )  pH, Arterial: 7.46  pH, Blood: x     /  pCO2: 35    /  pO2: 65    / HCO3: 25    / Base Excess: 1.3   /  SaO2: 93.7                            9.4    6.01  )-----------( 205      ( 05 Aug 2022 02:57 )             29.4     Hemoglobin: 9.4 g/dL (08-05 @ 02:57)  Hemoglobin: 9.9 g/dL (08-04 @ 01:30)  Hemoglobin: 9.6 g/dL (08-03 @ 05:05)  Hemoglobin: 9.2 g/dL (08-02 @ 13:35)    08-05    143  |  106  |  12  ----------------------------<  161<H>  4.4   |  23  |  0.5<L>    Ca    8.5      05 Aug 2022 05:14  Mg     1.9     08-05      Creatinine, Serum: 0.5 mg/dL (08-05 @ 05:14)  Creatinine, Serum: 0.5 mg/dL (08-03 @ 05:05)  Creatinine, Serum: 0.6 mg/dL (08-02 @ 13:35)  Creatinine, Serum: 0.5 mg/dL (08-02 @ 02:00)  Creatinine, Serum: 0.6 mg/dL (08-01 @ 13:40)          HOSPITAL MEDICATIONS:  MEDICATIONS  (STANDING):  aspirin enteric coated 325 milliGRAM(s) Oral daily  atorvastatin 40 milliGRAM(s) Oral at bedtime  chlorhexidine 2% Cloths 1 Application(s) Topical <User Schedule>  dextrose 5%. 1000 milliLiter(s) (100 mL/Hr) IV Continuous <Continuous>  dextrose 5%. 1000 milliLiter(s) (50 mL/Hr) IV Continuous <Continuous>  dextrose 50% Injectable 25 Gram(s) IV Push once  dextrose 50% Injectable 12.5 Gram(s) IV Push once  dextrose 50% Injectable 25 Gram(s) IV Push once  diltiazem    Tablet 60 milliGRAM(s) Oral every 8 hours  famotidine    Tablet 20 milliGRAM(s) Oral two times a day  glucagon  Injectable 1 milliGRAM(s) IntraMuscular once  guaiFENesin  milliGRAM(s) Oral every 12 hours  heparin   Injectable 5000 Unit(s) SubCutaneous every 8 hours  ibuprofen  Tablet. 400 milliGRAM(s) Oral every 8 hours  insulin glargine Injectable (LANTUS) 30 Unit(s) SubCutaneous every morning  insulin lispro (ADMELOG) corrective regimen sliding scale   SubCutaneous three times a day before meals  insulin lispro Injectable (ADMELOG) 5 Unit(s) SubCutaneous three times a day before meals  ipratropium    for Nebulization 500 MICROGram(s) Nebulizer every 6 hours  magnesium sulfate  IVPB 1 Gram(s) IV Intermittent every 12 hours  metFORMIN 1000 milliGRAM(s) Oral every 12 hours  metoprolol tartrate 50 milliGRAM(s) Oral every 8 hours  polyethylene glycol 3350 17 Gram(s) Oral daily  senna 1 Tablet(s) Oral every 12 hours  sitaGLIPtin 50 milliGRAM(s) Oral daily    MEDICATIONS  (PRN):  dextrose Oral Gel 15 Gram(s) Oral once PRN Blood Glucose LESS THAN 70 milliGRAM(s)/deciliter  oxyCODONE    IR 10 milliGRAM(s) Oral every 4 hours PRN Moderate Pain (4 - 6)  oxyCODONE    IR 5 milliGRAM(s) Oral every 4 hours PRN Mild Pain (1 - 3)      REVIEW OF SYSTEMS:  CONSTITUTIONAL: [X] all negative; [ ] weakness, [ ] fevers, [ ] chills  EYES/ENT: [X] all negative; [ ] visual changes, [ ] vertigo, [ ] throat pain, [ ] eye pain  NECK: [X] all negative; [ ] pain, [ ] stiffness  RESPIRATORY: [ ] all negative, [x ] cough, [ ] wheezing, [ ] hemoptysis, [ ] shortness of breath, [ x ] chest pain  CARDIOVASCULAR: [X] all negative; [ ] anginal chest pain, [ ] palpitations, [ ] orthopnea  GASTROINTESTINAL: [X] all negative; [ ]abdominal pain, [ ] nausea, [ ] vomiting, [ ] hematemesis, [ ] diarrhea, [ ] constipation, [ ] melena, [ ] hematochezia.  GENITOURINARY: [X] all negative; [ ] dysuria, [ ] frequency, [ ] hematuria  NEUROLOGICAL: [X] all negative; [ ] numbness, [ ] weakness, [ ] paresthesias  MUSCULOSKELETAL: [X] all negative, [ ] joint pain, [ ] joint swelling, [ ] joint redness, [ ] bone pain  SKIN: [X] all negative; [ ] itching, [ ] burning, [ ] rashes, [ ] lesions   All other review of systems is negative unless indicated above.    [  ] Unable to assess ROS because     PHYSICAL EXAM:          CONSTITUTIONAL: Well-developed; well-nourished; in no acute distress.   	SKIN: warm, dry, no rashes or lesions  	HEENT: Atraumatic. Normocephalic. PERRL. Moist membranes, no conjunctival injection, sclera clear  	NECK: Supple; non tender.  No JVD. No lymphadenopathy.  	CARD: Normal S1, S2. Rate and Rhythm are regular. No murmurs.  	RESP: Good air entry bilaterally, no wheezes, no rales no rhonchi.  	ABD: Soft, not tender, not distended, no CVA tenderness, no rebound no guarding, bowel sounds present  	EXT: Normal ROM.  No clubbing, no cyanosis, no pedal edema, no calf pain b/l, Peripheral pulses intact.  	LYMPH: No acute adenopathy.  	NEURO: Alert, awake, motor 5/5 R, 5/5 L, sensation intact bilat, CN 2-12 intact,          PSYCH: Cooperative, appropriate. Alert & oriented x 3, possible delusions     RADIOLOGY:  X Reviewed and interpreted by me  CxR from 08-05-22 shows mild congestion, no pneumothorax, no effusion, no cardiomegaly,       ECG:  X Reviewed and interpreted by me RBBB 77, QTc - 482

## 2022-08-05 NOTE — DISCHARGE NOTE NURSING/CASE MANAGEMENT/SOCIAL WORK - NSDCPEFALRISK_GEN_ALL_CORE
For information on Fall & Injury Prevention, visit: [email or web address] OR  [email or web address] OR  https://www.cdc.gov/steadi/patient.html

## 2022-08-05 NOTE — BH CONSULTATION LIAISON ASSESSMENT NOTE - NSBHCHARTREVIEWLAB_PSY_A_CORE FT
Dr. Tatiana Abdi reviewed  pathology report and notified patient of positive result. Dony Drake Np  had already reviewed the results with the patient and the patient is following up with them.                       9.4    6.01  )-----------( 205      ( 05 Aug 2022 02:57 )             29.4   08-05    143  |  106  |  12  ----------------------------<  161<H>  4.4   |  23  |  0.5<L>    Ca    8.5      05 Aug 2022 05:14  Mg     1.9     08-05

## 2022-08-05 NOTE — PROGRESS NOTE ADULT - ASSESSMENT
PROBLEMS:  I spent 45 minutes of critical care time examining patient, reviewing vitals, labs, medications, imaging and discussing with the team goals of care to prevent life-threatening in this patient who is at high risk for deterioration or death due to:      CAD - s/p CABG , Lopressor 50 q 12, Lipitor 40  HTN - Lopressor 50 q 12, Diltiazem 60 q 8 ( d/c home on cardizem  )  DM - metformin 1000 and Januvia 50, stop Lantus   Post op anemia - due to acute blood loss - stable        Case and plan discussed with CT Surgeons and CTU PAs.  Continue CTU supportive care and ongoing plan of care as per continuing CTU rounds.   Continue DVT/GI prophylaxis.  Incentive Spirometry 10 times an hour.  Continue to advance physical activity as tolerated and continue PT/OT as directed.    PLAN  Neuro: move all 4 extremities. no sensory or motor deficits  Pain management.   ibuprofen  Tablet. 400 milliGRAM(s) Oral every 8 hours  oxyCODONE    IR 10 milliGRAM(s) Oral every 4 hours PRN  oxyCODONE    IR 5 milliGRAM(s) Oral every 4 hours PRN    Pulm: Wean off supplemental oxygen as able. Daily CXR. Encourage coughing, deep breathing and use of incentive spirometry.   guaiFENesin  milliGRAM(s) Oral every 12 hours  ipratropium    for Nebulization 500 MICROGram(s) Nebulizer every 6 hours    Cardio: Monitor telemetry/alarms. Continue supportive care   diltiazem    Tablet 60 milliGRAM(s) Oral every 8 hours  metoprolol tartrate 50 milliGRAM(s) Oral every 12 hours    GI: Continue stool softeners.    famotidine    Tablet 20 milliGRAM(s) Oral two times a day  polyethylene glycol 3350 17 Gram(s) Oral daily  senna 1 Tablet(s) Oral every 12 hours    Nutrition: Continue present diet  Endocrine and glucose control:   atorvastatin 40 milliGRAM(s) Oral at bedtime  dextrose 50% Injectable 25 Gram(s) IV Push once  dextrose 50% Injectable 25 Gram(s) IV Push once  dextrose 50% Injectable 12.5 Gram(s) IV Push once  dextrose Oral Gel 15 Gram(s) Oral once PRN  glucagon  Injectable 1 milliGRAM(s) IntraMuscular once  insulin lispro (ADMELOG) corrective regimen sliding scale   SubCutaneous three times a day before meals  metFORMIN 1000 milliGRAM(s) Oral every 12 hours  sitaGLIPtin 50 milliGRAM(s) Oral daily    Renal: monitor urine output, supplement electrolytes as needed,     Vasc: Heparin SC and/or SCDs for DVT prophylaxis  aspirin enteric coated 325 milliGRAM(s) Oral daily  heparin   Injectable 5000 Unit(s) SubCutaneous every 8 hours    ID: Stable, no fever , no chills. Off antibiotics.    Therapy: OOB/ambulate  Disposition: start planing discharge home or placement    Pertinent clinical, laboratory, radiographic, hemodynamic, echocardiographic, respiratory data, microbiologic data and chart were reviewed and analyzed frequently throughout the course of the day and night. GI and DVT prophylaxis, glycemic control, head of bed elevation and skin care issues were addressed.  Patient seen, examined and plan discussed with CT Surgery / CTICU team during rounds.    [ ] The patient remains in critical and unstable condition, and requires ICU care and monitoring  [x ] The patient is improving but requires continued monitoring and adjustment of therapy

## 2022-08-05 NOTE — BH CONSULTATION LIAISON ASSESSMENT NOTE - OTHER
Patient sat in a chair Patient is observed moving all limb There was report of paranoia, but none elicited during this evaluation Brother at bedside

## 2022-08-05 NOTE — BH CONSULTATION LIAISON ASSESSMENT NOTE - HPI (INCLUDE ILLNESS QUALITY, SEVERITY, DURATION, TIMING, CONTEXT, MODIFYING FACTORS, ASSOCIATED SIGNS AND SYMPTOMS)
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    On evaluation, this patient is observed to be quite anxious appearing, and upon inquiring about his mood patient stated,  he is doing fine, and he just wants to get out of the hospital. He recalls making the statements of conspiracy and accusatory  toward the staff, which he attributed to his eagerness to leave the hospital (off note patient had a code grey  earlier  today because he wanted to leave) He feels they are holding him against his will,  given that he completed the surgery and he is doing better now. He is able to provide full information about his hospitalization stay. He is alert, oriented and at no distress. During the time of the evaluation, his speech is very fast, and quite vibrant, otherwise no overt psychosis is noted or reported. He mentions he has not slept well in the hospital, which he again he attributed to all the work up that needed to be done. Otherwise, he is very hopeful to be back with his family. He is not endorsing any suicidal or homicidal ideation.   He understands, he will need to slow down for awhile in order to fully recuperate. He plans to take some time off from work.      This patient's brother was at his bedside, Mr. Kathryn Quiñones who corroborated with the information noted above, and who has been staying with this patient for the past 5 days here at the hospital and noted Mr. Vanegas has no prior psychiatric history and his behavior is all at baseline.

## 2022-08-05 NOTE — BH CONSULTATION LIAISON ASSESSMENT NOTE - NSBHCONSULTFOLLOWAFTERCARE_PSY_A_CORE FT
[facility] outpatient psychiatry service located at [address] [place] [address], [id number], [phone]

## 2022-08-05 NOTE — BH CONSULTATION LIAISON ASSESSMENT NOTE - CURRENT MEDICATION
MEDICATIONS  (STANDING):  aspirin enteric coated 325 milliGRAM(s) Oral daily  atorvastatin 40 milliGRAM(s) Oral at bedtime  chlorhexidine 2% Cloths 1 Application(s) Topical <User Schedule>  dextrose 5%. 1000 milliLiter(s) (50 mL/Hr) IV Continuous <Continuous>  dextrose 5%. 1000 milliLiter(s) (100 mL/Hr) IV Continuous <Continuous>  dextrose 50% Injectable 25 Gram(s) IV Push once  dextrose 50% Injectable 25 Gram(s) IV Push once  dextrose 50% Injectable 12.5 Gram(s) IV Push once  diltiazem    Tablet 60 milliGRAM(s) Oral every 8 hours  famotidine    Tablet 20 milliGRAM(s) Oral two times a day  glucagon  Injectable 1 milliGRAM(s) IntraMuscular once  guaiFENesin  milliGRAM(s) Oral every 12 hours  heparin   Injectable 5000 Unit(s) SubCutaneous every 8 hours  ibuprofen  Tablet. 400 milliGRAM(s) Oral every 8 hours  insulin lispro (ADMELOG) corrective regimen sliding scale   SubCutaneous three times a day before meals  ipratropium    for Nebulization 500 MICROGram(s) Nebulizer every 6 hours  magnesium sulfate  IVPB 1 Gram(s) IV Intermittent every 12 hours  metFORMIN 1000 milliGRAM(s) Oral every 12 hours  metoprolol tartrate 50 milliGRAM(s) Oral every 12 hours  polyethylene glycol 3350 17 Gram(s) Oral daily  senna 1 Tablet(s) Oral every 12 hours  sitaGLIPtin 50 milliGRAM(s) Oral daily    MEDICATIONS  (PRN):  dextrose Oral Gel 15 Gram(s) Oral once PRN Blood Glucose LESS THAN 70 milliGRAM(s)/deciliter  oxyCODONE    IR 10 milliGRAM(s) Oral every 4 hours PRN Moderate Pain (4 - 6)  oxyCODONE    IR 5 milliGRAM(s) Oral every 4 hours PRN Mild Pain (1 - 3)

## 2022-08-06 PROBLEM — U07.1 COVID-19: Chronic | Status: ACTIVE | Noted: 2022-07-29

## 2022-08-07 ENCOUNTER — TRANSCRIPTION ENCOUNTER (OUTPATIENT)
Age: 52
End: 2022-08-07

## 2022-08-07 RX ORDER — METOPROLOL TARTRATE 50 MG
1 TABLET ORAL
Qty: 60 | Refills: 0
Start: 2022-08-07 | End: 2022-09-05

## 2022-08-09 ENCOUNTER — APPOINTMENT (OUTPATIENT)
Dept: CARE COORDINATION | Facility: HOME HEALTH | Age: 52
End: 2022-08-09

## 2022-08-09 PROCEDURE — 99024 POSTOP FOLLOW-UP VISIT: CPT

## 2022-08-09 RX ORDER — GEMFIBROZIL 600 MG/1
TABLET, FILM COATED ORAL
Refills: 0 | Status: DISCONTINUED | COMMUNITY
End: 2022-08-09

## 2022-08-09 RX ORDER — ASPIRIN 81 MG
81 TABLET, DELAYED RELEASE (ENTERIC COATED) ORAL
Refills: 0 | Status: DISCONTINUED | COMMUNITY
End: 2022-08-09

## 2022-08-09 RX ORDER — METOPROLOL TARTRATE 75 MG/1
TABLET, FILM COATED ORAL
Refills: 0 | Status: DISCONTINUED | COMMUNITY
End: 2022-08-09

## 2022-08-09 RX ORDER — SIMVASTATIN 40 MG/1
40 TABLET, FILM COATED ORAL
Refills: 0 | Status: ACTIVE | COMMUNITY
Start: 2022-08-09

## 2022-08-09 RX ORDER — ENALAPRIL MALEATE AND HYDROCHLOROTHIAZIDE 10; 25 MG/1; MG/1
10-25 TABLET ORAL
Qty: 90 | Refills: 0 | Status: DISCONTINUED | COMMUNITY
Start: 2020-02-28 | End: 2022-08-09

## 2022-08-09 RX ORDER — ASPIRIN 325 MG/1
325 TABLET, FILM COATED ORAL DAILY
Qty: 30 | Refills: 0 | Status: COMPLETED | COMMUNITY
Start: 2022-08-09 | End: 2022-09-08

## 2022-08-09 RX ORDER — ASPIRIN/CALCIUM CARB/MAGNESIUM 324 MG
1 TABLET ORAL
Qty: 30 | Refills: 0
Start: 2022-08-09 | End: 2022-09-07

## 2022-08-09 RX ORDER — SIMVASTATIN 80 MG/1
TABLET, FILM COATED ORAL
Refills: 0 | Status: DISCONTINUED | COMMUNITY
End: 2022-08-09

## 2022-08-09 RX ORDER — ASPIRIN/CALCIUM CARB/MAGNESIUM 324 MG
1 TABLET ORAL
Qty: 180 | Refills: 0
Start: 2022-08-09 | End: 2022-09-07

## 2022-08-09 RX ORDER — SILDENAFIL 100 MG/1
100 TABLET, FILM COATED ORAL
Qty: 30 | Refills: 5 | Status: DISCONTINUED | COMMUNITY
Start: 2021-04-08 | End: 2022-08-09

## 2022-08-09 RX ORDER — SITAGLIPTIN AND METFORMIN HYDROCHLORIDE 50; 1000 MG/1; MG/1
TABLET, FILM COATED ORAL
Refills: 0 | Status: DISCONTINUED | COMMUNITY
End: 2022-08-09

## 2022-08-09 RX ORDER — SITAGLIPTIN 100 MG/1
100 TABLET, FILM COATED ORAL DAILY
Refills: 0 | Status: ACTIVE | COMMUNITY
Start: 2022-08-09

## 2022-08-09 RX ORDER — HYDROCHLOROTHIAZIDE 50 MG/5 ML
SOLUTION, ORAL ORAL
Refills: 0 | Status: DISCONTINUED | COMMUNITY
End: 2022-08-09

## 2022-08-09 RX ORDER — GEMFIBROZIL 600 MG/1
600 TABLET, FILM COATED ORAL TWICE DAILY
Refills: 0 | Status: ACTIVE | COMMUNITY
Start: 2022-08-09

## 2022-08-09 RX ORDER — METFORMIN HYDROCHLORIDE 500 MG/1
500 TABLET, COATED ORAL
Refills: 0 | Status: ACTIVE | COMMUNITY
Start: 2022-08-09

## 2022-08-09 RX ORDER — NITROFURANTOIN (MONOHYDRATE/MACROCRYSTALS) 25; 75 MG/1; MG/1
100 CAPSULE ORAL TWICE DAILY
Qty: 6 | Refills: 0 | Status: DISCONTINUED | COMMUNITY
Start: 2020-10-26 | End: 2022-08-09

## 2022-08-09 RX ORDER — METOPROLOL SUCCINATE 25 MG/1
25 TABLET, EXTENDED RELEASE ORAL
Qty: 30 | Refills: 0 | Status: DISCONTINUED | COMMUNITY
Start: 2020-02-28 | End: 2022-08-09

## 2022-08-09 NOTE — HISTORY OF PRESENT ILLNESS
[FreeTextEntry1] : FOLLOW YOUR HEART HOME VISIT-[facility] Telehealth Home Visit made to  Mr. Quiñones  for post discharge transitional care management and post follow up.    Patient of Dr. Galo s/p CABG x 4 on 8/1/22.  Discharged on 8/5/22 from [facility]\par \par Mr. Quiñones is a 52 year old male with a PMH of bladder cancer, DM, ED, HTN, HLD, Covid 1/21, ex-smoker, + FH MI and a past surgical history of bladder tumor resection and umbilical hernia.  Pt. s/p  cardiac catheterization demonstrated left main disease with multi-vessel involvement.  On 8/1/2022 the patient underwent CABG x 4 . Post-operatively, patient admitted to paranoid thoughts, but was cleared by Psychiatry. Pt. discharged to home on 8/5/22.  Spoke with patient for follow up s/p hospitalization. weight 178, blood sugar 145.  Denies any new complaints/issues at this time.  Medications reviewed with patient with +teach back. HCS initiated with [facility].  Patient has scheduled follow up appointment. Reminded of CN role and contact number was provided to the patient.  Advised to call with any questions/concerns, verbalized understanding.\par \par \par

## 2022-08-09 NOTE — PHYSICAL EXAM
[FreeTextEntry1] : Upon visualization Midthoracic incision CDI & RENEE, CT sight scabbed, Right SVG incision CDI with ecchymosis noted trace edema x2BLE.

## 2022-08-11 ENCOUNTER — OUTPATIENT (OUTPATIENT)
Dept: OUTPATIENT SERVICES | Facility: HOSPITAL | Age: 52
LOS: 1 days | Discharge: HOME | End: 2022-08-11

## 2022-08-11 ENCOUNTER — APPOINTMENT (OUTPATIENT)
Dept: CARDIOTHORACIC SURGERY | Facility: CLINIC | Age: 52
End: 2022-08-11

## 2022-08-11 ENCOUNTER — RESULT REVIEW (OUTPATIENT)
Age: 52
End: 2022-08-11

## 2022-08-11 VITALS
HEART RATE: 90 BPM | DIASTOLIC BLOOD PRESSURE: 85 MMHG | BODY MASS INDEX: 27.13 KG/M2 | RESPIRATION RATE: 16 BRPM | HEIGHT: 68 IN | TEMPERATURE: 98.6 F | OXYGEN SATURATION: 95 % | SYSTOLIC BLOOD PRESSURE: 130 MMHG | WEIGHT: 179 LBS

## 2022-08-11 DIAGNOSIS — Z95.1 PRESENCE OF AORTOCORONARY BYPASS GRAFT: ICD-10-CM

## 2022-08-11 DIAGNOSIS — Z98.890 OTHER SPECIFIED POSTPROCEDURAL STATES: Chronic | ICD-10-CM

## 2022-08-11 PROCEDURE — 99024 POSTOP FOLLOW-UP VISIT: CPT

## 2022-08-11 PROCEDURE — 71046 X-RAY EXAM CHEST 2 VIEWS: CPT | Mod: 26

## 2022-08-12 LAB
ALBUMIN SERPL ELPH-MCNC: 4.6 G/DL
ALP BLD-CCNC: 78 U/L
ALT SERPL-CCNC: 29 U/L
ANION GAP SERPL CALC-SCNC: 16 MMOL/L
AST SERPL-CCNC: 16 U/L
BASOPHILS # BLD AUTO: 0.06 K/UL
BASOPHILS NFR BLD AUTO: 0.8 %
BILIRUB SERPL-MCNC: 0.4 MG/DL
BUN SERPL-MCNC: 18 MG/DL
CALCIUM SERPL-MCNC: 9.8 MG/DL
CHLORIDE SERPL-SCNC: 100 MMOL/L
CO2 SERPL-SCNC: 23 MMOL/L
CREAT SERPL-MCNC: 0.6 MG/DL
EGFR: 116 ML/MIN/1.73M2
EOSINOPHIL # BLD AUTO: 0.27 K/UL
EOSINOPHIL NFR BLD AUTO: 3.5 %
GLUCOSE SERPL-MCNC: 121 MG/DL
HCT VFR BLD CALC: 35.2 %
HGB BLD-MCNC: 11.1 G/DL
IMM GRANULOCYTES NFR BLD AUTO: 2.1 %
LYMPHOCYTES # BLD AUTO: 1.3 K/UL
LYMPHOCYTES NFR BLD AUTO: 16.9 %
MAN DIFF?: NORMAL
MCHC RBC-ENTMCNC: 25.6 PG
MCHC RBC-ENTMCNC: 31.5 G/DL
MCV RBC AUTO: 81.3 FL
MONOCYTES # BLD AUTO: 0.81 K/UL
MONOCYTES NFR BLD AUTO: 10.5 %
NEUTROPHILS # BLD AUTO: 5.1 K/UL
NEUTROPHILS NFR BLD AUTO: 66.2 %
PLATELET # BLD AUTO: 496 K/UL
POTASSIUM SERPL-SCNC: 4 MMOL/L
PROT SERPL-MCNC: 7.6 G/DL
RBC # BLD: 4.33 M/UL
RBC # FLD: 16.2 %
SODIUM SERPL-SCNC: 139 MMOL/L
WBC # FLD AUTO: 7.7 K/UL

## 2022-08-18 ENCOUNTER — APPOINTMENT (OUTPATIENT)
Dept: CARDIOTHORACIC SURGERY | Facility: CLINIC | Age: 52
End: 2022-08-18

## 2022-08-18 VITALS
OXYGEN SATURATION: 94 % | DIASTOLIC BLOOD PRESSURE: 75 MMHG | TEMPERATURE: 98.3 F | HEIGHT: 68 IN | BODY MASS INDEX: 26.67 KG/M2 | WEIGHT: 176 LBS | HEART RATE: 79 BPM | RESPIRATION RATE: 14 BRPM | SYSTOLIC BLOOD PRESSURE: 117 MMHG

## 2022-08-18 DIAGNOSIS — J44.9 CHRONIC OBSTRUCTIVE PULMONARY DISEASE, UNSPECIFIED: ICD-10-CM

## 2022-08-18 DIAGNOSIS — R74.01 ELEVATION OF LEVELS OF LIVER TRANSAMINASE LEVELS: ICD-10-CM

## 2022-08-18 DIAGNOSIS — K76.0 FATTY (CHANGE OF) LIVER, NOT ELSEWHERE CLASSIFIED: ICD-10-CM

## 2022-08-18 DIAGNOSIS — D62 ACUTE POSTHEMORRHAGIC ANEMIA: ICD-10-CM

## 2022-08-18 DIAGNOSIS — I25.118 ATHEROSCLEROTIC HEART DISEASE OF NATIVE CORONARY ARTERY WITH OTHER FORMS OF ANGINA PECTORIS: ICD-10-CM

## 2022-08-18 DIAGNOSIS — F43.22 ADJUSTMENT DISORDER WITH ANXIETY: ICD-10-CM

## 2022-08-18 DIAGNOSIS — E87.6 HYPOKALEMIA: ICD-10-CM

## 2022-08-18 DIAGNOSIS — F05 DELIRIUM DUE TO KNOWN PHYSIOLOGICAL CONDITION: ICD-10-CM

## 2022-08-18 DIAGNOSIS — E87.70 FLUID OVERLOAD, UNSPECIFIED: ICD-10-CM

## 2022-08-18 DIAGNOSIS — N52.9 MALE ERECTILE DYSFUNCTION, UNSPECIFIED: ICD-10-CM

## 2022-08-18 DIAGNOSIS — Z82.49 FAMILY HISTORY OF ISCHEMIC HEART DISEASE AND OTHER DISEASES OF THE CIRCULATORY SYSTEM: ICD-10-CM

## 2022-08-18 DIAGNOSIS — I65.23 OCCLUSION AND STENOSIS OF BILATERAL CAROTID ARTERIES: ICD-10-CM

## 2022-08-18 DIAGNOSIS — I45.2 BIFASCICULAR BLOCK: ICD-10-CM

## 2022-08-18 DIAGNOSIS — Z87.891 PERSONAL HISTORY OF NICOTINE DEPENDENCE: ICD-10-CM

## 2022-08-18 DIAGNOSIS — Z86.16 PERSONAL HISTORY OF COVID-19: ICD-10-CM

## 2022-08-18 DIAGNOSIS — I10 ESSENTIAL (PRIMARY) HYPERTENSION: ICD-10-CM

## 2022-08-18 DIAGNOSIS — Z79.82 LONG TERM (CURRENT) USE OF ASPIRIN: ICD-10-CM

## 2022-08-18 DIAGNOSIS — J98.11 ATELECTASIS: ICD-10-CM

## 2022-08-18 DIAGNOSIS — Z79.84 LONG TERM (CURRENT) USE OF ORAL HYPOGLYCEMIC DRUGS: ICD-10-CM

## 2022-08-18 DIAGNOSIS — E11.65 TYPE 2 DIABETES MELLITUS WITH HYPERGLYCEMIA: ICD-10-CM

## 2022-08-18 DIAGNOSIS — E78.5 HYPERLIPIDEMIA, UNSPECIFIED: ICD-10-CM

## 2022-08-18 DIAGNOSIS — Z85.51 PERSONAL HISTORY OF MALIGNANT NEOPLASM OF BLADDER: ICD-10-CM

## 2022-08-18 PROCEDURE — 99024 POSTOP FOLLOW-UP VISIT: CPT

## 2022-09-01 ENCOUNTER — APPOINTMENT (OUTPATIENT)
Dept: CARDIOTHORACIC SURGERY | Facility: CLINIC | Age: 52
End: 2022-09-01

## 2022-09-01 DIAGNOSIS — Z95.1 PRESENCE OF AORTOCORONARY BYPASS GRAFT: ICD-10-CM

## 2022-09-01 PROCEDURE — 99024 POSTOP FOLLOW-UP VISIT: CPT

## 2022-09-03 NOTE — ASSESSMENT
[FreeTextEntry1] : I reviewed with him that covid or not all surgery has risk, Bladder tumors can grow into cancers and or seed other parts of the bladder. [place] is now as COVID FREE as we can make it. All patients get tested and positives if able go to [place] to special covid floors / units. All staff and all patients, everyone in the hospital needs a mask. It does not mean the risk is zero but the risk of waiting and the cancer spreading or growing exceeds the risk of covid\par \par He will talk to Dr. Senior, if he doesn’t have rui in me I suggested a second opinion\par We will not henny him. If he has further questions and or if he wants to schedule he will call. If he doesn’t want that is his decision and though I disagree with it I will honor it.\par 
No

## 2022-09-09 ENCOUNTER — TRANSCRIPTION ENCOUNTER (OUTPATIENT)
Age: 52
End: 2022-09-09

## 2022-09-26 ENCOUNTER — APPOINTMENT (OUTPATIENT)
Dept: UROLOGY | Facility: CLINIC | Age: 52
End: 2022-09-26

## 2022-09-26 PROCEDURE — 52000 CYSTOURETHROSCOPY: CPT

## 2023-03-20 ENCOUNTER — APPOINTMENT (OUTPATIENT)
Dept: UROLOGY | Facility: CLINIC | Age: 53
End: 2023-03-20
Payer: COMMERCIAL

## 2023-03-20 LAB
BILIRUB UR QL STRIP: NORMAL
COLLECTION METHOD: NORMAL
GLUCOSE UR-MCNC: 250
HCG UR QL: 0.2 EU/DL
HGB UR QL STRIP.AUTO: NORMAL
KETONES UR-MCNC: NORMAL
LEUKOCYTE ESTERASE UR QL STRIP: NORMAL
NITRITE UR QL STRIP: NORMAL
PH UR STRIP: 5.5
PROT UR STRIP-MCNC: NORMAL
SP GR UR STRIP: 1.02

## 2023-03-20 PROCEDURE — 52000 CYSTOURETHROSCOPY: CPT

## 2023-03-20 PROCEDURE — 81003 URINALYSIS AUTO W/O SCOPE: CPT | Mod: QW

## 2023-03-21 LAB
APPEARANCE: CLEAR
BILIRUBIN URINE: NEGATIVE
BLOOD URINE: NEGATIVE
COLOR: NORMAL
GLUCOSE QUALITATIVE U: ABNORMAL
KETONES URINE: NEGATIVE
LEUKOCYTE ESTERASE URINE: NEGATIVE
NITRITE URINE: NEGATIVE
PH URINE: 6
PROTEIN URINE: NORMAL
SPECIFIC GRAVITY URINE: 1.02
UROBILINOGEN URINE: NORMAL

## 2023-03-23 LAB
BACTERIA UR CULT: NORMAL
URINE CYTOLOGY: NORMAL

## 2023-03-28 NOTE — H&P PST ADULT - MUSCULOSKELETAL
She would also like to discuss management of hair loss today.  Was scheduled for laser but would like to discuss treatment for hair loss and facial hair.      PHYSICAL EXAMINATION-- WDWF in NAD .  Alert and oriented times three.   Well developed, well nourished.   Appropriate affect.    Exam of face, scalp.         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    Receding of frontal hairline with few sparse isolated hairs.  Perifollicular erythema and scale on vertex of scalp-new since last appointment.  Prior perifollicular erythema and scale along frontal hairline now appears resolved.  No atrophy.  Has been treating scalp with Finasteride 0.1%, Minoxidil 7%, Dutasteride 0.1% solution qhs since last appointment.  Occasionally using clobetasol solution in the morning but forgets this at times.  Questions if medication is helping.  Findings discussed.  We reviewed natural history, tendency to progression and permanent scarring hair loss in affected areas and that hair may not regrow in affected areas. Goal is to keep from losing more hair.      We discussed other potential treatments including Plaquenil, doxycycline, oral minoxidil, oral finasteride.  She had decided against oral finasteride due to breast cancer concerns, not interested in oral minoxidil due to possibility of increased facial hair; would be willing to try doxycycline since no blood work monitoring involved and no risk of facial hair.  We reviewed that the doxycycline would be used for its anti-inflammatory effects in attempt to stop the condition, and would not be expected to directly promote hair regrowth.  She would like to proceed.     -add doxycycline 100mg bid   Use discussed   We discussed the possibility of increased sun sensitivity, allergic reaction, stomach upset/esophagitis, liver inflammation, headaches/pseudotumor related to doxycycline.  Discussed measures to help prevent esophagitis.    Discontinue medication and call the office if problems.  Discussed time to see effect from treatment.    -recommend applying clobetasol solution q.a.m. to the reddish scaly area on the vertex of the scalp.  Site for application identified with her.    -cont. [place] # [id number]. Finasteride 0.1%, Minoxidil 7%, Dutasteride 0.1% solution qhs. Use discussed and time to see effect from treatment.        Hirsutism This is a chronic condition not at treatment goal.    She notes excess facial hair on cheeks, chin, and upper lip for some time which has been increasing.     No evident facial hair on exam today. She has been doing removal per patient.   She has started Vaniqa cream.   Use reviewed.  Discussed that she should not apply this to eyebrows or upper or lower eyelids. Discussed time to see effect from treatment.         RTC 3 months recheck       She stated understanding of above and is in agreement with plans.  Encouraged to call with questions/concerns.     On 3/28/2023 IVeronica scribed the services personally performed by Shae Nguyen MD    ***      No joint pain, swelling or deformity; no limitation of movement

## 2023-04-03 ENCOUNTER — APPOINTMENT (OUTPATIENT)
Dept: GASTROENTEROLOGY | Facility: CLINIC | Age: 53
End: 2023-04-03
Payer: COMMERCIAL

## 2023-04-03 VITALS
DIASTOLIC BLOOD PRESSURE: 88 MMHG | HEART RATE: 75 BPM | HEIGHT: 68 IN | OXYGEN SATURATION: 97 % | WEIGHT: 185.4 LBS | SYSTOLIC BLOOD PRESSURE: 126 MMHG | BODY MASS INDEX: 28.1 KG/M2

## 2023-04-03 DIAGNOSIS — Z87.891 PERSONAL HISTORY OF NICOTINE DEPENDENCE: ICD-10-CM

## 2023-04-03 DIAGNOSIS — L29.0 PRURITUS ANI: ICD-10-CM

## 2023-04-03 DIAGNOSIS — K62.5 HEMORRHAGE OF ANUS AND RECTUM: ICD-10-CM

## 2023-04-03 DIAGNOSIS — K62.89 OTHER SPECIFIED DISEASES OF ANUS AND RECTUM: ICD-10-CM

## 2023-04-03 DIAGNOSIS — Z78.9 OTHER SPECIFIED HEALTH STATUS: ICD-10-CM

## 2023-04-03 PROCEDURE — 99204 OFFICE O/P NEW MOD 45 MIN: CPT

## 2023-04-03 RX ORDER — METOPROLOL TARTRATE 50 MG/1
50 TABLET, FILM COATED ORAL
Refills: 0 | Status: DISCONTINUED | COMMUNITY
Start: 2022-08-09 | End: 2023-04-03

## 2023-04-03 RX ORDER — SITAGLIPTIN AND METFORMIN HYDROCHLORIDE 50; 1000 MG/1; MG/1
50-1000 TABLET, FILM COATED ORAL TWICE DAILY
Qty: 60 | Refills: 0 | Status: DISCONTINUED | COMMUNITY
Start: 2022-08-11 | End: 2023-04-03

## 2023-04-03 RX ORDER — DILTIAZEM HYDROCHLORIDE 120 MG/1
120 CAPSULE, EXTENDED RELEASE ORAL DAILY
Refills: 0 | Status: DISCONTINUED | COMMUNITY
Start: 2022-08-09 | End: 2023-04-03

## 2023-04-03 RX ORDER — OXYCODONE AND ACETAMINOPHEN 5; 325 MG/1; MG/1
5-325 TABLET ORAL
Refills: 0 | Status: DISCONTINUED | COMMUNITY
Start: 2022-08-09 | End: 2023-04-03

## 2023-04-03 RX ORDER — CEFUROXIME AXETIL 250 MG/1
250 TABLET ORAL
Qty: 6 | Refills: 0 | Status: DISCONTINUED | COMMUNITY
Start: 2023-03-20 | End: 2023-04-03

## 2023-04-03 RX ORDER — POLYETHYLENE GLYCOL 3350 AND ELECTROLYTES WITH LEMON FLAVOR 236; 22.74; 6.74; 5.86; 2.97 G/4L; G/4L; G/4L; G/4L; G/4L
236 POWDER, FOR SOLUTION ORAL
Qty: 1 | Refills: 0 | Status: ACTIVE | COMMUNITY
Start: 2023-04-03 | End: 1900-01-01

## 2023-04-03 RX ORDER — FAMOTIDINE 20 MG/1
20 TABLET, FILM COATED ORAL
Refills: 0 | Status: DISCONTINUED | COMMUNITY
Start: 2022-08-09 | End: 2023-04-03

## 2023-04-03 NOTE — HISTORY OF PRESENT ILLNESS
[de-identified] : Patient is a 52-year-old male with history of bladder cancer hypertension diabetes, open heart surgery 6 months ago, who now presents for a colonoscopy.  Patient states that for the last year he has been seeing blood in his stools off and on especially when he has hard stools.  Otherwise his appetite is good he has no dysphagia.  Occasional reflux once a month bowel movements are normal.  He has never had a colonoscopy or an endoscopy.

## 2023-06-09 NOTE — ASU PATIENT PROFILE, ADULT - PACKS PER DAY
1 Outreach attempt was made to schedule a Medicare Wellness Visit. This was the first attempt. Contact was made, [facility] appointment scheduled.     Patient needs lab orders placed, lab appt scheduled

## 2023-07-25 NOTE — ASU PREOP CHECKLIST - IDENTIFICATION BAND VERIFIED
Prescription Strength Graduated Compression Stockings Recommendations: The patient was counseled that prescription strength graduated compression stockings should be worn for all waking hours. They will follow up with a venous specialist to monitor graduated compression stocking usage and their symptoms. Detail Level: Simple Detail Level: Zone Detail Level: Detailed done

## 2023-09-20 LAB
PSA FREE FLD-MCNC: 30 %
PSA FREE SERPL-MCNC: 0.29 NG/ML
PSA SERPL-MCNC: 0.98 NG/ML

## 2023-09-25 ENCOUNTER — APPOINTMENT (OUTPATIENT)
Dept: UROLOGY | Facility: CLINIC | Age: 53
End: 2023-09-25
Payer: COMMERCIAL

## 2023-09-25 LAB
BILIRUB UR QL STRIP: NORMAL
COLLECTION METHOD: NORMAL
GLUCOSE UR-MCNC: 1000
HCG UR QL: 0.2 EU/DL
HGB UR QL STRIP.AUTO: NORMAL
KETONES UR-MCNC: NORMAL
LEUKOCYTE ESTERASE UR QL STRIP: NORMAL
NITRITE UR QL STRIP: NORMAL
PH UR STRIP: 5
PROT UR STRIP-MCNC: NORMAL
SP GR UR STRIP: 1.01

## 2023-09-25 PROCEDURE — 81003 URINALYSIS AUTO W/O SCOPE: CPT | Mod: QW

## 2023-09-25 PROCEDURE — 52000 CYSTOURETHROSCOPY: CPT

## 2023-10-03 LAB
APPEARANCE: CLEAR
BACTERIA UR CULT: NORMAL
BILIRUBIN URINE: NEGATIVE
BLOOD URINE: NEGATIVE
COLOR: YELLOW
GLUCOSE QUALITATIVE U: >=1000 MG/DL
KETONES URINE: NEGATIVE MG/DL
LEUKOCYTE ESTERASE URINE: NEGATIVE
NITRITE URINE: NEGATIVE
PH URINE: 5.5
PROTEIN URINE: NEGATIVE MG/DL
SPECIFIC GRAVITY URINE: 1.03
URINE CYTOLOGY: NORMAL
UROBILINOGEN URINE: 0.2 MG/DL

## 2023-11-03 ENCOUNTER — APPOINTMENT (OUTPATIENT)
Dept: UROLOGY | Facility: CLINIC | Age: 53
End: 2023-11-03
Payer: COMMERCIAL

## 2023-11-03 VITALS
DIASTOLIC BLOOD PRESSURE: 69 MMHG | BODY MASS INDEX: 25.76 KG/M2 | SYSTOLIC BLOOD PRESSURE: 133 MMHG | HEART RATE: 82 BPM | TEMPERATURE: 99 F | WEIGHT: 170 LBS | HEIGHT: 68 IN

## 2023-11-03 DIAGNOSIS — N52.9 MALE ERECTILE DYSFUNCTION, UNSPECIFIED: ICD-10-CM

## 2023-11-03 PROCEDURE — 99213 OFFICE O/P EST LOW 20 MIN: CPT

## 2023-11-03 RX ORDER — SILDENAFIL 100 MG/1
100 TABLET, FILM COATED ORAL
Qty: 30 | Refills: 1 | Status: ACTIVE | COMMUNITY
Start: 2023-11-03 | End: 1900-01-01

## 2024-03-25 ENCOUNTER — APPOINTMENT (OUTPATIENT)
Dept: UROLOGY | Facility: CLINIC | Age: 54
End: 2024-03-25
Payer: COMMERCIAL

## 2024-03-25 DIAGNOSIS — C67.9 MALIGNANT NEOPLASM OF BLADDER, UNSPECIFIED: ICD-10-CM

## 2024-03-25 LAB
BILIRUB UR QL STRIP: NEGATIVE
COLLECTION METHOD: NORMAL
GLUCOSE UR-MCNC: 1000
HCG UR QL: 0.2 EU/DL
HGB UR QL STRIP.AUTO: NORMAL
KETONES UR-MCNC: NEGATIVE
LEUKOCYTE ESTERASE UR QL STRIP: NEGATIVE
NITRITE UR QL STRIP: NEGATIVE
PH UR STRIP: 5
PROT UR STRIP-MCNC: NEGATIVE
SP GR UR STRIP: 1.01

## 2024-03-25 PROCEDURE — 81003 URINALYSIS AUTO W/O SCOPE: CPT | Mod: QW

## 2024-03-25 PROCEDURE — 52000 CYSTOURETHROSCOPY: CPT

## 2024-03-30 LAB — URINE CYTOLOGY: NORMAL

## 2024-05-03 ENCOUNTER — APPOINTMENT (OUTPATIENT)
Dept: UROLOGY | Facility: CLINIC | Age: 54
End: 2024-05-03

## 2024-09-03 ENCOUNTER — OUTPATIENT (OUTPATIENT)
Dept: OUTPATIENT SERVICES | Facility: HOSPITAL | Age: 54
LOS: 1 days | End: 2024-09-03
Payer: COMMERCIAL

## 2024-09-03 DIAGNOSIS — Z98.890 OTHER SPECIFIED POSTPROCEDURAL STATES: Chronic | ICD-10-CM

## 2024-09-03 DIAGNOSIS — Z00.8 ENCOUNTER FOR OTHER GENERAL EXAMINATION: ICD-10-CM

## 2024-09-03 DIAGNOSIS — R07.9 CHEST PAIN, UNSPECIFIED: ICD-10-CM

## 2024-09-03 PROCEDURE — 93018 CV STRESS TEST I&R ONLY: CPT

## 2024-09-03 PROCEDURE — 78452 HT MUSCLE IMAGE SPECT MULT: CPT

## 2024-09-03 PROCEDURE — 78452 HT MUSCLE IMAGE SPECT MULT: CPT | Mod: 26

## 2024-09-03 PROCEDURE — A9500: CPT

## 2024-09-04 DIAGNOSIS — R07.9 CHEST PAIN, UNSPECIFIED: ICD-10-CM

## 2024-09-19 NOTE — ASU PATIENT PROFILE, ADULT - ACCEPTABLE
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    PCNU remains in place draining clear yellow urine  She has been following outpatient at [facility] and there ID team has her on chronic amoxicillin suppression  Continue amoxicillin suppression per [facility] ID  Recommend ongoing follow up by [facility] vascular surgery team    0

## 2024-09-22 ENCOUNTER — NON-APPOINTMENT (OUTPATIENT)
Age: 54
End: 2024-09-22

## 2024-09-23 ENCOUNTER — APPOINTMENT (OUTPATIENT)
Dept: UROLOGY | Facility: CLINIC | Age: 54
End: 2024-09-23
Payer: COMMERCIAL

## 2024-09-23 DIAGNOSIS — C67.9 MALIGNANT NEOPLASM OF BLADDER, UNSPECIFIED: ICD-10-CM

## 2024-09-23 LAB
BILIRUB UR QL STRIP: NORMAL
COLLECTION METHOD: NORMAL
GLUCOSE UR-MCNC: NORMAL
HCG UR QL: 0.2 EU/DL
HGB UR QL STRIP.AUTO: NORMAL
KETONES UR-MCNC: NORMAL
LEUKOCYTE ESTERASE UR QL STRIP: NORMAL
NITRITE UR QL STRIP: NORMAL
PH UR STRIP: 5.5
PROT UR STRIP-MCNC: NORMAL
SP GR UR STRIP: 1.01

## 2024-09-23 PROCEDURE — 52000 CYSTOURETHROSCOPY: CPT

## 2024-09-23 PROCEDURE — 81003 URINALYSIS AUTO W/O SCOPE: CPT | Mod: QW

## 2024-10-03 LAB — URINE CYTOLOGY: NORMAL
